# Patient Record
Sex: MALE | Race: WHITE | NOT HISPANIC OR LATINO | Employment: OTHER | URBAN - METROPOLITAN AREA
[De-identification: names, ages, dates, MRNs, and addresses within clinical notes are randomized per-mention and may not be internally consistent; named-entity substitution may affect disease eponyms.]

---

## 2019-07-02 NOTE — PROGRESS NOTES
Subjective:     Encounter Date:07/10/2019    Patient ID: Lucio Castañeda is a 78 y.o.  white male from [place], [place], retired .    REFERRING INTERNIST: Rocky Morel MD  INTERVENTIONALIST: Leo Ramirez MD    Chief Complaint:   Chief Complaint   Patient presents with   • Coronary Artery Disease     Consult     Problem List:  1. Ischemic heart disease:  a. STEMI with emergent left heart catheterization, 4/18/2015: Angioplasty and stenting of the RCA at the proximal, mid, and distal sites, extraction thrombectomy of the posterior lateral branch of the RCA, intracoronary administration of NTG, integrelin, and tPA   b. Echocardiogram, April 2015: Inferobasal hypokinesis with preserved LVEF, no pericardial effusion, the mitral valvular apparatus remains intact  c. Residual class I symptoms, July 2019, with acceptable EKG  2. Hypertension  3. Hyperlipidemia; on statin therapy  4. GERD  5. Former tobacco abuse; smoked 0.25 packs/day x15 years, quit in 2004  6. Diverticulosis  7. Degenerative disc disease  8. History of noninvasive papillary urothelial bladder carcinoma  9. Surgical history:  a. TURBT  b. Batista's procedure with subsequent colostomy takedown  c. Colon surgery for perf  d. Appendectomy  e. Right nasal basal cell resection  f. [facility]    No Known Allergies      Current Outpatient Medications:   •  ANORO ELLIPTA 62.5-25 MCG/INH aerosol powder  inhaler, Inhale 1 puff Daily., Disp: , Rfl: 3  •  carvedilol (COREG) 12.5 MG tablet, Take 12.5 mg by mouth 2 (Two) Times a Day., Disp: , Rfl: 12  •  losartan-hydrochlorothiazide (HYZAAR) 100-25 MG per tablet, Take 1 tablet by mouth Daily., Disp: , Rfl: 3  •  omeprazole (priLOSEC) 40 MG capsule, Daily., Disp: , Rfl:   •  simvastatin (ZOCOR) 40 MG tablet, Daily., Disp: , Rfl:   · ASA 81 mg daily    History of Present Illness  This is a 78-year-old white male who presents to establish cardiology care because he saw his physician who  "recommended that he follow up with a cardiologist since he had a STEMI in 2015 and has had no further cardiac work-up since that time.  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  He occasionally has shortness of breath that he attributes to his COPD.  He remotely smoked 0.25 packs/day x15 years but quit 25 years ago.  He denies any CVAs, TIAs, seizures, DVTs, PEs, orthopnea, PND, rheumatic fever, arrhythmias, valvular dysfunction, or dysfunction, or claudication.  When he had his STEMI in , he felt that he had the \"worst heartburn of my life.\"  He has not had any of these sensations since then.  He has no family history of early CAD.  The patient states that he rarely goes to the doctor.  Patient otherwise denies chest pain, shortness of breath, PND, edema, palpitations, syncope or presyncope at this time.    Cardiovascular Disease Risk Factors  hyptertension, hyperlipidemia, increased age, male gender    Social History     Socioeconomic History   • Marital status:      Spouse name: Not on file   • Number of children: 3   • Years of education: Not on file   • Highest education level: Not on file   Tobacco Use   • Smoking status: Former Smoker     Packs/day: 1.00     Types: Cigarettes     Last attempt to quit: 7/10/2004     Years since quitting: 15.0   • Smokeless tobacco: Former User   Substance and Sexual Activity   • Alcohol use: No     Frequency: Never   • Drug use: No   • Sexual activity: Defer       Family History   Problem Relation Age of Onset   • Hypertension Mother    • Cancer Father    · Mother: Hypertension  · Father:  at 52 from melanoma  · 2 brothers: Healthy  · 3 daughters: Healthy    Review of Systems   Constitution: Positive for malaise/fatigue.   HENT: Positive for hearing loss.    Eyes: Negative.    Cardiovascular: Negative for chest pain, claudication, " "dyspnea on exertion, irregular heartbeat, leg swelling, near-syncope, orthopnea, palpitations, paroxysmal nocturnal dyspnea and syncope.   Respiratory: Positive for shortness of breath, sputum production and wheezing. Negative for sleep disturbances due to breathing and snoring.    Endocrine: Negative.    Hematologic/Lymphatic: Negative.    Skin: Negative.    Musculoskeletal: Negative.    Gastrointestinal: Positive for heartburn.   Genitourinary:        Difficulty urinating   Neurological: Negative for excessive daytime sleepiness, focal weakness and seizures.   Psychiatric/Behavioral: Negative.    Allergic/Immunologic: Negative.       Obtained and negative except as outlined in problem list and HPI.      ECG 12 Lead  Date/Time: 7/10/2019 12:09 PM  Performed by: Manny Mays MD  Authorized by: Manny Mays MD   Rhythm comments: Sinus bradycardia, otherwise normal ECG, 51 bpm,  ms,  ms,  ms, no significant changes from last ECG                 Objective:       Vitals:    07/10/19 1028 07/10/19 1029 07/10/19 1030 07/10/19 1031   BP: 163/100 169/88 (!) 170/112 (!) 183/94   BP Location: Right arm Right arm Left arm Left arm   Patient Position: Sitting Standing Sitting Standing   Pulse: 52 (!) 49 64 51   Weight: 67.1 kg (148 lb)      Height: 175.3 cm (69\")      Recheck blood pressure left arm sitting was 150/80  Body mass index is 21.86 kg/m².     Physical Exam   Constitutional: He is oriented to person, place, and time. He appears well-developed and well-nourished.   HENT:   Mouth/Throat: Uvula is midline, oropharynx is clear and moist and mucous membranes are normal. He does not have dentures. No oral lesions. Normal dentition. No dental abscesses, uvula swelling, lacerations or dental caries.   Eyes:   Fundoscopic exam:       The right eye shows AV nicking. The right eye shows no exudate and no hemorrhage.        The left eye shows AV nicking. The left eye shows no exudate and no " hemorrhage.   Neck: No JVD present. Carotid bruit is not present. No thyromegaly present.   Cardiovascular: Regular rhythm, S1 normal and S2 normal. Exam reveals distant heart sounds. Exam reveals no gallop, no S3 and no friction rub.   No murmur heard.  Pulses:       Carotid pulses are 1+ on the right side, and 1+ on the left side.       Radial pulses are 1+ on the right side, and 1+ on the left side.        Femoral pulses are 1+ on the right side, and 1+ on the left side.       Popliteal pulses are 1+ on the right side, and 1+ on the left side.        Dorsalis pedis pulses are 1+ on the right side, and 1+ on the left side.        Posterior tibial pulses are 1+ on the right side, and 1+ on the left side.   Pulmonary/Chest: Effort normal. He has decreased breath sounds. He has no wheezes. He has no rhonchi. He has no rales.   Abdominal: Soft. He exhibits no mass. There is no hepatosplenomegaly. There is no tenderness. There is no guarding.   Bowel sounds audible x4   Musculoskeletal: Normal range of motion. He exhibits no edema.   Lymphadenopathy:     He has no cervical adenopathy.   Neurological: He is alert and oriented to person, place, and time.   Skin: Skin is warm, dry and intact. No rash noted.   Vitals reviewed.      Lab Review:   7/23/2018:  · Vitamin D - 36.3  · TSH - 1.63  · Vitamin B12 - 557, folate - 11.1  · Lipid panel: Cholesterol 218, triglycerides 79, HDL 48,   · CBC: WBC 7.4, RBC 5.09, hemoglobin 15.6, hematocrit 43, MCV 85, MCH 30.6, MCHC 36.3, RDW 13.5, platelets 232  · CMP: Glucose 90, BUN 26, creatinine 1.57, GFR 42, sodium 133, potassium 4.4, chloride 96, carbon dioxide 23, calcium 9.8, protein 7.5, albumin 4.8, globulin 2.7, bilirubin 0.4, alkaline phosphatase 46, AST 16, ALT 14    · ECG 6/29/2015: Sinus bradycardia, 52 bpm,  ms,  ms,  ms        Assessment:   Patient with ischemic heart disease with STEMI and stent placement x3 to the RCA in 2015 with no further  cardiac testing.  He remotely had uncontrolled hyperlipidemia with no new FLP to review.  His hypertension is uncontrolled, so we will initiate amlodipine 5 mg daily.  We will have the patient come in for an IV Lexiscan Cardiolite stress test.     Diagnosis Plan   1. IHD (ischemic heart disease)  Stress Test With Myocardial Perfusion - One Day   2. Essential hypertension   Uncontrolled, add amlodipine 5 mg daily   3. Hyperlipidemia LDL goal <70  Lipid Panel   4. Dyspnea, unspecified type  Stress Test With Myocardial Perfusion - One Day          Plan:       1. Patient to continue current medications and close follow up with the above providers other than to initiate amlodipine 5 mg daily.  2. Tentative cardiology follow up in 6 weeks or patient may return sooner PRN.  3. FLP  4. IV Lexiscan Cardiolite stress test ordered  5. 1 800 card provided    Scribed for Manny Mays MD by Jazzy Gurrola, JUAN DANIEL. 7/10/2019  12:15 PM    I, Manny Mays MD, [facility], personally performed the services described in this documentation as scribed by the above named individual in my presence, and it is both accurate and complete. At 1:30 PM on 07/10/2019

## 2019-07-10 ENCOUNTER — CONSULT (OUTPATIENT)
Dept: CARDIOLOGY | Facility: CLINIC | Age: 79
End: 2019-07-10

## 2019-07-10 VITALS
SYSTOLIC BLOOD PRESSURE: 183 MMHG | DIASTOLIC BLOOD PRESSURE: 94 MMHG | HEIGHT: 69 IN | BODY MASS INDEX: 21.92 KG/M2 | WEIGHT: 148 LBS | HEART RATE: 51 BPM

## 2019-07-10 DIAGNOSIS — E78.5 HYPERLIPIDEMIA LDL GOAL <70: ICD-10-CM

## 2019-07-10 DIAGNOSIS — I25.9 IHD (ISCHEMIC HEART DISEASE): Primary | ICD-10-CM

## 2019-07-10 DIAGNOSIS — R06.00 DYSPNEA, UNSPECIFIED TYPE: ICD-10-CM

## 2019-07-10 DIAGNOSIS — I10 ESSENTIAL HYPERTENSION: ICD-10-CM

## 2019-07-10 PROBLEM — K57.50 DIVERTICUL DISEASE SMALL AND LARGE INTESTINE, NO PERFORATI OR ABSCESS: Status: ACTIVE | Noted: 2019-07-10

## 2019-07-10 PROBLEM — K21.9 GASTROESOPHAGEAL REFLUX DISEASE WITHOUT ESOPHAGITIS: Status: ACTIVE | Noted: 2019-07-10

## 2019-07-10 PROCEDURE — 93000 ELECTROCARDIOGRAM COMPLETE: CPT | Performed by: INTERNAL MEDICINE

## 2019-07-10 PROCEDURE — 99204 OFFICE O/P NEW MOD 45 MIN: CPT | Performed by: INTERNAL MEDICINE

## 2019-07-10 RX ORDER — SIMVASTATIN 40 MG
TABLET ORAL DAILY
COMMUNITY
Start: 2019-04-23

## 2019-07-10 RX ORDER — LOSARTAN POTASSIUM AND HYDROCHLOROTHIAZIDE 25; 100 MG/1; MG/1
1 TABLET ORAL DAILY
Refills: 3 | COMMUNITY
Start: 2019-06-11 | End: 2019-10-24

## 2019-07-10 RX ORDER — AMLODIPINE BESYLATE 5 MG/1
5 TABLET ORAL DAILY
Qty: 90 TABLET | Refills: 3 | Status: SHIPPED | OUTPATIENT
Start: 2019-07-10 | End: 2019-10-24

## 2019-07-10 RX ORDER — OMEPRAZOLE 40 MG/1
CAPSULE, DELAYED RELEASE ORAL DAILY
COMMUNITY
Start: 2019-04-23 | End: 2022-12-02

## 2019-07-10 RX ORDER — CARVEDILOL 12.5 MG/1
12.5 TABLET ORAL 2 TIMES DAILY
Refills: 12 | COMMUNITY
Start: 2019-06-11

## 2019-07-19 ENCOUNTER — HOSPITAL ENCOUNTER (OUTPATIENT)
Dept: CARDIOLOGY | Facility: HOSPITAL | Age: 79
Discharge: HOME | End: 2019-07-19

## 2019-07-19 VITALS — BODY MASS INDEX: 21.92 KG/M2 | WEIGHT: 148 LBS | HEIGHT: 69 IN

## 2019-07-19 DIAGNOSIS — R06.00 DYSPNEA, UNSPECIFIED TYPE: ICD-10-CM

## 2019-07-19 DIAGNOSIS — I25.9 IHD (ISCHEMIC HEART DISEASE): ICD-10-CM

## 2019-07-19 PROCEDURE — 0 TECHNETIUM SESTAMIBI: Performed by: INTERNAL MEDICINE

## 2019-07-19 PROCEDURE — 93018 CV STRESS TEST I&R ONLY: CPT | Performed by: INTERNAL MEDICINE

## 2019-07-19 PROCEDURE — 93017 CV STRESS TEST TRACING ONLY: CPT

## 2019-07-19 PROCEDURE — 25010000002 REGADENOSON 0.4 MG/5ML SOLUTION: Performed by: INTERNAL MEDICINE

## 2019-07-19 PROCEDURE — 78452 HT MUSCLE IMAGE SPECT MULT: CPT | Performed by: INTERNAL MEDICINE

## 2019-07-19 PROCEDURE — A9500 TC99M SESTAMIBI: HCPCS | Performed by: INTERNAL MEDICINE

## 2019-07-19 PROCEDURE — 78452 HT MUSCLE IMAGE SPECT MULT: CPT

## 2019-07-19 RX ADMIN — REGADENOSON 0.4 MG: 0.08 INJECTION, SOLUTION INTRAVENOUS at 10:57

## 2019-07-19 RX ADMIN — TECHNETIUM TC 99M SESTAMIBI 1 DOSE: 1 INJECTION INTRAVENOUS at 10:54

## 2019-07-19 RX ADMIN — TECHNETIUM TC 99M SESTAMIBI 1 DOSE: 1 INJECTION INTRAVENOUS at 08:45

## 2019-07-22 LAB
BH CV NUCLEAR PRIOR STUDY: 3
BH CV STRESS BP STAGE 1: NORMAL
BH CV STRESS BP STAGE 2: NORMAL
BH CV STRESS COMMENTS STAGE 1: NORMAL
BH CV STRESS COMMENTS STAGE 2: NORMAL
BH CV STRESS DOSE REGADENOSON STAGE 1: 0.4
BH CV STRESS DURATION MIN STAGE 1: 4
BH CV STRESS DURATION MIN STAGE 2: 2
BH CV STRESS DURATION SEC STAGE 1: 0
BH CV STRESS DURATION SEC STAGE 2: 0
BH CV STRESS HR STAGE 1: 63
BH CV STRESS HR STAGE 2: 62
BH CV STRESS PROTOCOL 1: NORMAL
BH CV STRESS RECOVERY BP: NORMAL MMHG
BH CV STRESS RECOVERY HR: 61 BPM
BH CV STRESS STAGE 1: 1
BH CV STRESS STAGE 2: 2
LV EF NUC BP: 43 %
MAXIMAL PREDICTED HEART RATE: 143 BPM
PERCENT MAX PREDICTED HR: 54.55 %
STRESS BASELINE BP: NORMAL MMHG
STRESS BASELINE HR: 53 BPM
STRESS PERCENT HR: 64 %
STRESS POST PEAK BP: NORMAL MMHG
STRESS POST PEAK HR: 78 BPM
STRESS TARGET HR: 122 BPM

## 2019-10-22 NOTE — PROGRESS NOTES
"    Subjective:     Encounter Date:10/24/2019 - [place] Office    Patient ID: Lucio Castañeda (goes by Pito) is a 78 y.o.  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     REFERRING INTERNIST: Rocky Morel MD  INTERVENTIONALIST: Leo Ramirez MD    Chief Complaint:   Chief Complaint   Patient presents with   • IHD     Problem List:  1. Ischemic heart disease:  a. STEMI with emergent left heart catheterization, 4/18/2015: Angioplasty and stenting of the RCA at the proximal, mid, and distal sites, extraction thrombectomy of the posterior lateral branch of the RCA, intracoronary administration of NTG, integrelin, and tPA   b. Echocardiogram, April 2015: Inferobasal hypokinesis with preserved LVEF, no pericardial effusion, the mitral valvular apparatus remains intact  c. Residual class I symptoms, July 2019, with acceptable EKG and abnormal acceptable IV Lexiscan quantitative SPECT gated Cardiolite pharmacologic stress study without anginal type chest discomfort or further ischemic electrocardiographic changes in the setting of inferoposterior and apical \"scar\" with mild reduction in calculated global left ventricular ejection fraction (LVEF 0.43); current study suggests significant 1-2 vessel obstructive coronary artery disease with continued efforts at risk modification and medical therapy felt warranted, July 2019  2. Hypertension  3. Hyperlipidemia; on statin therapy  4. GERD  5. Former tobacco abuse; smoked 0.25 packs/day x15 years, quit in 2004  6. Diverticulosis  7. Degenerative disc disease  8. History of noninvasive papillary urothelial bladder carcinoma  9. Surgical history:  a. TURBT  b. Batista's procedure with subsequent colostomy takedown  c. Colon surgery for perf  d. Appendectomy  e. Right nasal basal cell resection  f. C     No Known Allergies    Current Outpatient Medications:   •  ANORO ELLIPTA 62.5-25 MCG/INH aerosol powder  inhaler, Inhale 1 puff Daily., " "Disp: , Rfl: 3  •  aspirin 81 MG tablet, Take 81 mg by mouth Daily., Disp: , Rfl:   •  carvedilol (COREG) 12.5 MG tablet, Take 12.5 mg by mouth 2 (Two) Times a Day., Disp: , Rfl: 12  •  losartan (COZAAR) 50 MG tablet, Take 100 mg by mouth Daily., Disp: , Rfl:   •  omeprazole (priLOSEC) 40 MG capsule, Daily., Disp: , Rfl:   •  simvastatin (ZOCOR) 40 MG tablet, Daily., Disp: , Rfl:     History of Present Illness: Patient returns for scheduled followup after a 3-1/2 month hiatus; he was a consult in our [place] office on 07/10/2019.  He was to have an FLP drawn, but this test has not been done.  He says \"I'm doing my best for an old stephen.\"  He says his mother is 95 years old and \"goes every day.\"  His losartan-HCTZ was changed to losartan because it caused his blood pressure to drop.  He also stopped taking amlodipine because it also caused hypotension, and he says \"it made my skin tingle in the sun.\"  He says this has resolved since he stopped taking the amlodipine.  He says his blood pressure typically runs 127-140/70 when he checks it at home.  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  He has not had a flu shot yet because his physician's office does not have any; they recommended that he go to the drugstore, and that is what he plans to do. Patient otherwise denies chest pain, shortness of breath, PND, edema, palpitations, syncope or presyncope at this time.        Review of Systems   Respiratory: Positive for shortness of breath.    Musculoskeletal: Positive for myalgias.      Obtained and negative except as outlined in problem list and HPI.    Procedures       Objective:       Vitals:    10/24/19 1135 10/24/19 1139 10/24/19 1152   BP: 144/70 150/78 144/86   BP Location: Left arm Left arm Right arm   Patient Position: Sitting Standing Sitting   Pulse: 58     SpO2: 98%     Weight: 67.6 kg (149 lb)     Height: 175.3 cm (69\")   "     Body mass index is 22 kg/m².   Last weight:  148 lbs.    Physical Exam   Constitutional: He is oriented to person, place, and time. He appears well-developed and well-nourished.   Neck: No JVD present. Carotid bruit is not present. No thyromegaly present.   Cardiovascular: Regular rhythm, S1 normal and S2 normal. Exam reveals no gallop, no S3 and no friction rub.   Murmur heard.   Medium-pitched harsh early systolic murmur is present with a grade of 1/6 at the upper right sternal border.  Pulses:       Carotid pulses are 1+ on the right side, and 1+ on the left side.       Radial pulses are 1+ on the right side, and 1+ on the left side.        Femoral pulses are 1+ on the right side, and 1+ on the left side.       Popliteal pulses are 1+ on the right side, and 1+ on the left side.        Dorsalis pedis pulses are 1+ on the right side, and 1+ on the left side.        Posterior tibial pulses are 1+ on the right side, and 1+ on the left side.   Pulmonary/Chest: Effort normal. He has decreased breath sounds. He has no wheezes. He has no rhonchi. He has no rales.   Abdominal: Soft. He exhibits no mass. There is no hepatosplenomegaly. There is no tenderness. There is no guarding.   Bowel sounds audible x4   Musculoskeletal: Normal range of motion. He exhibits no edema.   Lymphadenopathy:     He has no cervical adenopathy.   Neurological: He is alert and oriented to person, place, and time.   Skin: Skin is warm, dry and intact. No rash noted.   Vitals reviewed.        Lab Review:   Regadenoson stress test with myocardial perfusion SPECT, 07/19/2019:  · There is no prior study available for comparison; patient imaged with right arm at side due to right shoulder pain.  · Left ventricular ejection fraction is mildly reduced (calculated EF = 43%).  · A pharmacological stress test was performed using IV regadenoson.  · The patient reported no symptoms during the stress test.  · There was no ST segment deviation noted during  "stress.  · Arrhythmias during stress: PVC x 1.  · Diaphragmatic attenuation artifact is present.  · Myocardial perfusion imaging indicates a medium-sized infarct located in the inferior wall and apex with no significant ischemia noted (quantitative assessment demonstrates 19% inferoposterior and apical \"scar\" at rest and 8% post-stress without associated ischemia).  · Impressions are consistent with an intermediate risk study.  · Findings consistent with an abnormal acceptable pharmacologic ECG stress test.  · Abnormal acceptable IV Lexiscan quantitative SPECT gated Cardiolite pharmacologic stress study without anginal type chest discomfort or further ischemic electrocardiographic changes in the setting of inferoposterior and apical \"scar\" with mild reduction in calculated global left ventricular ejection fraction (LVEF 0.43); current study suggests significant 1-2 vessel obstructive coronary artery disease with continued efforts at risk modification and medical therapy felt warranted.  · Submitted for interpretation 22 July 2019.          Assessment:       Overall continued acceptable course with no new interim cardiopulmonary complaints with good functional status. We will defer additional diagnostic or therapeutic intervention from a cardiac perspective at this time.       Diagnosis Plan   1. Essential hypertension  Suboptimal control; continue to monitor blood pressure and follow closely with Dr. Morel   2. Dyslipidemia  No data to review; continue simvastatin   3. IHD (ischemic heart disease)  No recurrent angina pectoris or CHF on current activity schedule; continue current treatment; would defer LHC at this time            Plan:         1. Patient to continue current medications and close follow up with the above providers.  2. Influenza immunization is strongly recommended.  3. Tentative cardiology follow up in May 2020, or patient may return sooner PRN.    Transcribed by Taryn Rico for Dr." Manny Mays at 9:22 AM on 10/24/2019    I, Manny Mays MD, FACC, personally performed the services described in this documentation as scribed by the above named individual in my presence, and it is both accurate and complete. At 11:47 AM on 10/24/2019

## 2019-10-24 ENCOUNTER — OFFICE VISIT (OUTPATIENT)
Dept: CARDIOLOGY | Facility: CLINIC | Age: 79
End: 2019-10-24

## 2019-10-24 VITALS
WEIGHT: 149 LBS | HEART RATE: 58 BPM | DIASTOLIC BLOOD PRESSURE: 86 MMHG | BODY MASS INDEX: 22.07 KG/M2 | OXYGEN SATURATION: 98 % | HEIGHT: 69 IN | SYSTOLIC BLOOD PRESSURE: 144 MMHG

## 2019-10-24 DIAGNOSIS — I25.9 IHD (ISCHEMIC HEART DISEASE): ICD-10-CM

## 2019-10-24 DIAGNOSIS — E78.5 DYSLIPIDEMIA: ICD-10-CM

## 2019-10-24 DIAGNOSIS — I10 ESSENTIAL HYPERTENSION: Primary | ICD-10-CM

## 2019-10-24 PROCEDURE — 99214 OFFICE O/P EST MOD 30 MIN: CPT | Performed by: INTERNAL MEDICINE

## 2019-10-24 RX ORDER — LOSARTAN POTASSIUM 50 MG/1
100 TABLET ORAL DAILY
COMMUNITY
End: 2020-12-15 | Stop reason: HOSPADM

## 2020-03-12 ENCOUNTER — TELEPHONE (OUTPATIENT)
Dept: CARDIOLOGY | Facility: CLINIC | Age: 80
End: 2020-03-12

## 2020-03-12 NOTE — TELEPHONE ENCOUNTER
Tell him if he has had no additional cardiac complaints, he can schedule his surgery with low-moderate cardiovascular risk.  We thank Dr. Patel for allowing us to review notes from him.    Thanks

## 2020-03-12 NOTE — TELEPHONE ENCOUNTER
Pt is planning on having a right reverse total shoulder with Dr. Garth Patel at [facility]. Surgery is not yet scheduled.     Pt's LOV 10/24/19      What is patient's cardiac risk?    Please advise.

## 2020-11-30 ENCOUNTER — OFFICE VISIT (OUTPATIENT)
Dept: NEUROSURGERY | Facility: CLINIC | Age: 80
End: 2020-11-30

## 2020-11-30 VITALS
BODY MASS INDEX: 21.77 KG/M2 | HEIGHT: 69 IN | TEMPERATURE: 98 F | DIASTOLIC BLOOD PRESSURE: 64 MMHG | WEIGHT: 147 LBS | SYSTOLIC BLOOD PRESSURE: 126 MMHG

## 2020-11-30 DIAGNOSIS — G45.3 AMAUROSIS FUGAX OF LEFT EYE: ICD-10-CM

## 2020-11-30 DIAGNOSIS — I65.22 CAROTID STENOSIS, SYMPTOMATIC W/O INFARCT, LEFT: Primary | ICD-10-CM

## 2020-11-30 PROCEDURE — 99204 OFFICE O/P NEW MOD 45 MIN: CPT | Performed by: RADIOLOGY

## 2020-11-30 NOTE — PROGRESS NOTES
"NAME: FORTUNATO ALLISON   DOS: 2020  : 1940  PCP: Rocky Morel MD    Chief Complaint:    Chief Complaint   Patient presents with   • Carotid Artery Disease     Left amaurosis fugax       History of Present Illness:  79 y.o. male who experienced a 10-15-second episode of left-sided amaurosis fugax in mid 2020.  He denies any additional or recurrent stroke or TIA-like symptoms.  He had a carotid duplex which suggests a \"high-grade\" left carotid stenosis.  He has been started on a Plavix antiplatelet regimen.  He is a non-smoker.  I am seeing him in consultation regarding his symptomatic left carotid stenosis.    Past Medical History:  Past Medical History:   Diagnosis Date   • Acid reflux    • Heart failure (CMS/HCC)    • Hyperlipidemia    • Hypertension    • IHD (ischemic heart disease)        Past Surgical History:  Past Surgical History:   Procedure Laterality Date   • BLADDER TUMOR EXCISION     • COLON SURGERY         Review of Systems:        Review of Systems   Neurological: Positive for dizziness.   All other systems reviewed and are negative.       Medications    Current Outpatient Medications:   •  ANORO ELLIPTA 62.5-25 MCG/INH aerosol powder  inhaler, Inhale 1 puff Daily., Disp: , Rfl: 3  •  aspirin 81 MG tablet, Take 81 mg by mouth Daily., Disp: , Rfl:   •  carvedilol (COREG) 12.5 MG tablet, Take 12.5 mg by mouth 2 (Two) Times a Day., Disp: , Rfl: 12  •  losartan (COZAAR) 50 MG tablet, Take 100 mg by mouth Daily., Disp: , Rfl:   •  omeprazole (priLOSEC) 40 MG capsule, Daily., Disp: , Rfl:   •  simvastatin (ZOCOR) 40 MG tablet, Daily., Disp: , Rfl:     Allergies:  No Known Allergies    Social Hx:  Social History     Tobacco Use   • Smoking status: Former Smoker     Packs/day: 1.00     Types: Cigarettes     Quit date: 7/10/2004     Years since quittin.4   • Smokeless tobacco: Former User   Substance Use Topics   • Alcohol use: No     Frequency: Never   • Drug use: No " "      Family Hx:  Family History   Problem Relation Age of Onset   • Hypertension Mother    • Cancer Father        Review of Imaging:  Carotid duplex dated 11/17/2020 from the [facility] in [place], [place] was reviewed along with its corresponding radiologic report.  There are abnormally increased velocities within the left carotid vasculature concerning for a high-grade (greater than 80%) stenosis.  Peak velocities within the left carotid vasculature are 356/105 cm/s, with an ICA/CCA ratio of 5.9.  The right carotid vasculature is relatively \"void\" of emo dynamically significant disease, with peak velocities of 80/23 cm/s, and a ratio of 1.3.    Physical Examination:  Vitals:    11/30/20 1257   BP: 126/64   Temp: 98 °F (36.7 °C)        General Appearance:   Well developed, well nourished, well groomed, alert, and cooperative.  Cardiovascular: Regular rate and rhythm. No carotid bruits      Neurological examination:  Neurologic Exam     Mental Status   Oriented to person, place, and time.   Speech: speech is normal   Level of consciousness: alert    Cranial Nerves   Cranial nerves II through XII intact.     Motor Exam     Strength   Strength 5/5 throughout.     Sensory Exam   Light touch normal.     Gait, Coordination, and Reflexes     Gait  Gait: normal      Diagnoses/Plan:    Mr. Castañeda is a 79 y.o. male who experienced a 10-15-second episode of left-sided amaurosis fugax in mid November 2020.  He has had carotid duplex suggesting a \"high-grade\" left carotid stenosis as the culprit lesion.  He has been started on a Plavix antiplatelet regimen, and has experienced no recurrent stroke or TIA-like symptoms since.  I discussed various treatment options with Mr. Castañeda, to include CEA versus angioplasty/stent placement.  I instructed him that we will need to have him follow-up in the next week or so with a CT angiogram of the neck, to evaluate the morphology/location of his stenosis, and triage " care (again CEA versus stent).  He is agreeable to this, and we will follow up with him in 1 week's time with CT angiogram of the neck, deferring any treatment pending results of that study.  During the interim, he will add low-dose aspirin (start dual antiplatelet therapy), and to contact our office in/or call 911 if he experiences any recurrent stroke or TIA-like symptoms during the interim.

## 2020-12-07 ENCOUNTER — PREP FOR SURGERY (OUTPATIENT)
Dept: OTHER | Facility: HOSPITAL | Age: 80
End: 2020-12-07

## 2020-12-07 ENCOUNTER — HOSPITAL ENCOUNTER (OUTPATIENT)
Dept: CT IMAGING | Facility: HOSPITAL | Age: 80
Discharge: HOME | End: 2020-12-07
Admitting: RADIOLOGY

## 2020-12-07 ENCOUNTER — OFFICE VISIT (OUTPATIENT)
Dept: NEUROSURGERY | Facility: CLINIC | Age: 80
End: 2020-12-07

## 2020-12-07 VITALS
WEIGHT: 147 LBS | SYSTOLIC BLOOD PRESSURE: 140 MMHG | HEIGHT: 69 IN | BODY MASS INDEX: 21.77 KG/M2 | TEMPERATURE: 97.8 F | DIASTOLIC BLOOD PRESSURE: 78 MMHG

## 2020-12-07 DIAGNOSIS — I65.22 CAROTID STENOSIS, SYMPTOMATIC W/O INFARCT, LEFT: ICD-10-CM

## 2020-12-07 DIAGNOSIS — G45.3 AMAUROSIS FUGAX OF LEFT EYE: ICD-10-CM

## 2020-12-07 DIAGNOSIS — I65.22 CAROTID STENOSIS, SYMPTOMATIC W/O INFARCT, LEFT: Primary | ICD-10-CM

## 2020-12-07 LAB — CREAT BLDA-MCNC: 1.7 MG/DL (ref 0.6–1.3)

## 2020-12-07 PROCEDURE — 0 IOPAMIDOL PER 1 ML: Performed by: RADIOLOGY

## 2020-12-07 PROCEDURE — 70498 CT ANGIOGRAPHY NECK: CPT

## 2020-12-07 PROCEDURE — 99213 OFFICE O/P EST LOW 20 MIN: CPT | Performed by: RADIOLOGY

## 2020-12-07 PROCEDURE — 82565 ASSAY OF CREATININE: CPT

## 2020-12-07 RX ORDER — SODIUM CHLORIDE 0.9 % (FLUSH) 0.9 %
1-10 SYRINGE (ML) INJECTION AS NEEDED
Status: CANCELLED | OUTPATIENT
Start: 2020-12-07

## 2020-12-07 RX ORDER — SODIUM CHLORIDE 0.9 % (FLUSH) 0.9 %
3 SYRINGE (ML) INJECTION EVERY 12 HOURS SCHEDULED
Status: CANCELLED | OUTPATIENT
Start: 2020-12-07

## 2020-12-07 RX ORDER — SODIUM CHLORIDE 9 MG/ML
50 INJECTION, SOLUTION INTRAVENOUS CONTINUOUS
Status: CANCELLED | OUTPATIENT
Start: 2020-12-07

## 2020-12-07 RX ADMIN — IOPAMIDOL 70 ML: 755 INJECTION, SOLUTION INTRAVENOUS at 14:03

## 2020-12-07 NOTE — PROGRESS NOTES
"NAME: FORTUNATO ALLISON   DOS: 2020  : 1940  PCP: Rocky Morel MD    Chief Complaint:    Chief Complaint   Patient presents with   • Carotid Artery Disease     Left sided amaurosis fugax       History of Present Illness:  80 y.o. male known to the neuro interventional service, having been recently seen in the neuro interventional clinic for evaluation of symptomatic, high-grade left carotid stenosis.  Mr. Allison had experienced a 10-15-second episode of left-sided amaurosis fugax in mid 2020.  He denies any additional or recurrent stroke or TIA-like symptoms, but does complain of some \"dizziness\"..  He had a carotid duplex which suggests a \"high-grade\" left carotid stenosis, and was started a Plavix antiplatelet regimen.  He is a non-smoker.      I had recommended obtaining a CT angiogram of the neck, to triage care for his symptomatic left carotid stenosis (CEA versus carotid stenting).  He presents today for follow-up of his CT angiogram.      Past Medical History:  Past Medical History:   Diagnosis Date   • Acid reflux    • Carotid stenosis, symptomatic w/o infarct, left 2020    left sided amaurosis fugax   • Heart failure (CMS/HCC)    • Hyperlipidemia    • Hypertension    • IHD (ischemic heart disease)        Past Surgical History:  Past Surgical History:   Procedure Laterality Date   • BLADDER TUMOR EXCISION     • COLON SURGERY         Review of Systems:        Review of Systems   Neurological: Positive for dizziness.   All other systems reviewed and are negative.       Medications    Current Outpatient Medications:   •  ANORO ELLIPTA 62.5-25 MCG/INH aerosol powder  inhaler, Inhale 1 puff Daily., Disp: , Rfl: 3  •  aspirin 81 MG tablet, Take 81 mg by mouth Daily., Disp: , Rfl:   •  carvedilol (COREG) 12.5 MG tablet, Take 12.5 mg by mouth 2 (Two) Times a Day., Disp: , Rfl: 12  •  losartan (COZAAR) 50 MG tablet, Take 100 mg by mouth Daily., Disp: , Rfl:   •  omeprazole (priLOSEC) " "40 MG capsule, Daily., Disp: , Rfl:   •  simvastatin (ZOCOR) 40 MG tablet, Daily., Disp: , Rfl:   No current facility-administered medications for this visit.     Allergies:  No Known Allergies    Social Hx:  Social History     Tobacco Use   • Smoking status: Former Smoker     Packs/day: 1.00     Types: Cigarettes     Quit date: 7/10/2004     Years since quittin.4   • Smokeless tobacco: Former User   Substance Use Topics   • Alcohol use: No     Frequency: Never   • Drug use: No       Family Hx:  Family History   Problem Relation Age of Onset   • Hypertension Mother    • Cancer Father        Review of Imaging:  Carotid duplex dated 2020 from the [facility] diagnostic [place] in [place], [place] was reviewed along with its corresponding radiologic report.  There are abnormally increased velocities within the left carotid vasculature concerning for a high-grade (greater than 80%) stenosis.  Peak velocities within the left carotid vasculature are 356/105 cm/s, with an ICA/CCA ratio of 5.9.  The right carotid vasculature is relatively \"void\" of emo dynamically significant disease, with peak velocities of 80/23 cm/s, and a ratio of 1.3.    CT angiogram of the neck dated 2020 from [facility] was reviewed along with its corresponding radiologic report.  The CT angiogram demonstrates extensive lipid-laden and calcific plaque at the left common carotid bifurcation and extending into the internal carotid artery origin, producing a high-grade (greater than 80%) stenosis.  There is relatively mild calcific plaque at the right carotid bifurcation, but without hemodynamically significant disease.  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  The left posterior communicating artery similarly appears \"hypoplastic\", and I suspect that there is an \"isolated\" " "left MCA vascular territory.  Additionally, there is a probable \"high-grade\" stenosis involving the proximal left vertebral artery, and an indeterminant/possible high-grade stenosis involving the right vertebral artery origin.    Physical Examination:  Vitals:    12/07/20 1511   BP: 140/78   Temp: 97.8 °F (36.6 °C)        General Appearance:   Well developed, well nourished, well groomed, alert, and cooperative.  Cardiovascular: Regular rate and rhythm. No carotid bruits      Neurological examination:  Neurologic Exam     Mental Status   Oriented to person, place, and time.   Speech: speech is normal   Level of consciousness: alert    Cranial Nerves   Cranial nerves II through XII intact.     Motor Exam     Strength   Strength 5/5 throughout.     Sensory Exam   Light touch normal.     Gait, Coordination, and Reflexes     Gait  Gait: normal      Diagnoses/Plan:    Mr. Castañeda is a 80 y.o. male with a symptomatic, high-grade left carotid stenosis (recent amaurosis fugax).  CT angiogram today confirms a greater than 80%, symptomatic left carotid lesion, along with a probable \"isolated\" left MCA vascular territory, dependent upon the left carotid for perfusion.  I reviewed the CT angiogram with my neurosurgical colleagues, and given the lack of appreciable collateral flow, we feel that carotid angioplasty/stent placement is the optimal treatment for his symptomatic left carotid disease.  He will be maintained on his a dual antiplatelet regimen, and we will tentatively schedule him for left carotid angioplasty/stent placement in the next week or so.  Additionally, we will perform a full diagnostic angiogram, not only to evaluate his collateral flow, but into interrogate possible high-grade bilateral high-grade left vertebral disease which may be contributing to some of his dizziness symptoms.                  "

## 2020-12-07 NOTE — H&P (VIEW-ONLY)
"NAME: FORTUNATO ALLISON   DOS: 2020  : 1940  PCP: Rocky Morel MD    Chief Complaint:    Chief Complaint   Patient presents with   • Carotid Artery Disease     Left sided amaurosis fugax       History of Present Illness:  80 y.o. male known to the neuro interventional service, having been recently seen in the neuro interventional clinic for evaluation of symptomatic, high-grade left carotid stenosis.  Mr. Allison had experienced a 10-15-second episode of left-sided amaurosis fugax in mid 2020.  He denies any additional or recurrent stroke or TIA-like symptoms, but does complain of some \"dizziness\"..  He had a carotid duplex which suggests a \"high-grade\" left carotid stenosis, and was started a Plavix antiplatelet regimen.  He is a non-smoker.      I had recommended obtaining a CT angiogram of the neck, to triage care for his symptomatic left carotid stenosis (CEA versus carotid stenting).  He presents today for follow-up of his CT angiogram.      Past Medical History:  Past Medical History:   Diagnosis Date   • Acid reflux    • Carotid stenosis, symptomatic w/o infarct, left 2020    left sided amaurosis fugax   • Heart failure (CMS/HCC)    • Hyperlipidemia    • Hypertension    • IHD (ischemic heart disease)        Past Surgical History:  Past Surgical History:   Procedure Laterality Date   • BLADDER TUMOR EXCISION     • COLON SURGERY         Review of Systems:        Review of Systems   Neurological: Positive for dizziness.   All other systems reviewed and are negative.       Medications    Current Outpatient Medications:   •  ANORO ELLIPTA 62.5-25 MCG/INH aerosol powder  inhaler, Inhale 1 puff Daily., Disp: , Rfl: 3  •  aspirin 81 MG tablet, Take 81 mg by mouth Daily., Disp: , Rfl:   •  carvedilol (COREG) 12.5 MG tablet, Take 12.5 mg by mouth 2 (Two) Times a Day., Disp: , Rfl: 12  •  losartan (COZAAR) 50 MG tablet, Take 100 mg by mouth Daily., Disp: , Rfl:   •  omeprazole (priLOSEC) " "40 MG capsule, Daily., Disp: , Rfl:   •  simvastatin (ZOCOR) 40 MG tablet, Daily., Disp: , Rfl:   No current facility-administered medications for this visit.     Allergies:  No Known Allergies    Social Hx:  Social History     Tobacco Use   • Smoking status: Former Smoker     Packs/day: 1.00     Types: Cigarettes     Quit date: 7/10/2004     Years since quittin.4   • Smokeless tobacco: Former User   Substance Use Topics   • Alcohol use: No     Frequency: Never   • Drug use: No       Family Hx:  Family History   Problem Relation Age of Onset   • Hypertension Mother    • Cancer Father        Review of Imaging:  Carotid duplex dated 2020 from the [facility] diagnostic [place] in [place], [place] was reviewed along with its corresponding radiologic report.  There are abnormally increased velocities within the left carotid vasculature concerning for a high-grade (greater than 80%) stenosis.  Peak velocities within the left carotid vasculature are 356/105 cm/s, with an ICA/CCA ratio of 5.9.  The right carotid vasculature is relatively \"void\" of emo dynamically significant disease, with peak velocities of 80/23 cm/s, and a ratio of 1.3.    CT angiogram of the neck dated 2020 from [facility] was reviewed along with its corresponding radiologic report.  The CT angiogram demonstrates extensive lipid-laden and calcific plaque at the left common carotid bifurcation and extending into the internal carotid artery origin, producing a high-grade (greater than 80%) stenosis.  There is relatively mild calcific plaque at the right carotid bifurcation, but without hemodynamically significant disease.  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  The left posterior communicating artery similarly appears \"hypoplastic\", and I suspect that there is an \"isolated\" " "left MCA vascular territory.  Additionally, there is a probable \"high-grade\" stenosis involving the proximal left vertebral artery, and an indeterminant/possible high-grade stenosis involving the right vertebral artery origin.    Physical Examination:  Vitals:    12/07/20 1511   BP: 140/78   Temp: 97.8 °F (36.6 °C)        General Appearance:   Well developed, well nourished, well groomed, alert, and cooperative.  Cardiovascular: Regular rate and rhythm. No carotid bruits      Neurological examination:  Neurologic Exam     Mental Status   Oriented to person, place, and time.   Speech: speech is normal   Level of consciousness: alert    Cranial Nerves   Cranial nerves II through XII intact.     Motor Exam     Strength   Strength 5/5 throughout.     Sensory Exam   Light touch normal.     Gait, Coordination, and Reflexes     Gait  Gait: normal      Diagnoses/Plan:    Mr. Castañeda is a 80 y.o. male with a symptomatic, high-grade left carotid stenosis (recent amaurosis fugax).  CT angiogram today confirms a greater than 80%, symptomatic left carotid lesion, along with a probable \"isolated\" left MCA vascular territory, dependent upon the left carotid for perfusion.  I reviewed the CT angiogram with my neurosurgical colleagues, and given the lack of appreciable collateral flow, we feel that carotid angioplasty/stent placement is the optimal treatment for his symptomatic left carotid disease.  He will be maintained on his a dual antiplatelet regimen, and we will tentatively schedule him for left carotid angioplasty/stent placement in the next week or so.  Additionally, we will perform a full diagnostic angiogram, not only to evaluate his collateral flow, but into interrogate possible high-grade bilateral high-grade left vertebral disease which may be contributing to some of his dizziness symptoms.                  "

## 2020-12-11 ENCOUNTER — APPOINTMENT (OUTPATIENT)
Dept: PREADMISSION TESTING | Facility: HOSPITAL | Age: 80
End: 2020-12-11

## 2020-12-11 LAB — SARS-COV-2 RNA RESP QL NAA+PROBE: NOT DETECTED

## 2020-12-11 PROCEDURE — U0004 COV-19 TEST NON-CDC HGH THRU: HCPCS

## 2020-12-11 PROCEDURE — C9803 HOPD COVID-19 SPEC COLLECT: HCPCS

## 2020-12-14 ENCOUNTER — APPOINTMENT (OUTPATIENT)
Dept: CARDIOLOGY | Facility: HOSPITAL | Age: 80
End: 2020-12-14

## 2020-12-14 ENCOUNTER — HOSPITAL ENCOUNTER (INPATIENT)
Facility: HOSPITAL | Age: 80
LOS: 1 days | Discharge: HOME | End: 2020-12-15
Attending: RADIOLOGY | Admitting: RADIOLOGY

## 2020-12-14 DIAGNOSIS — G45.3 AMAUROSIS FUGAX OF LEFT EYE: ICD-10-CM

## 2020-12-14 DIAGNOSIS — I65.22 CAROTID STENOSIS, SYMPTOMATIC W/O INFARCT, LEFT: ICD-10-CM

## 2020-12-14 PROBLEM — T14.8XXA HEMATOMA: Status: ACTIVE | Noted: 2020-12-14

## 2020-12-14 LAB
ABO GROUP BLD: NORMAL
ABO GROUP BLD: NORMAL
ACT BLD: 274 SECONDS (ref 82–152)
ANION GAP SERPL CALCULATED.3IONS-SCNC: 9 MMOL/L (ref 5–15)
BH CV ECHO MEAS - BSA(HAYCOCK): 1.8 M^2
BH CV ECHO MEAS - BSA(HAYCOCK): 1.8 M^2
BH CV ECHO MEAS - BSA: 1.8 M^2
BH CV ECHO MEAS - BSA: 1.8 M^2
BH CV ECHO MEAS - BZI_BMI: 21.4 KILOGRAMS/M^2
BH CV ECHO MEAS - BZI_BMI: 21.4 KILOGRAMS/M^2
BH CV ECHO MEAS - BZI_METRIC_HEIGHT: 177.8 CM
BH CV ECHO MEAS - BZI_METRIC_HEIGHT: 177.8 CM
BH CV ECHO MEAS - BZI_METRIC_WEIGHT: 67.6 KG
BH CV ECHO MEAS - BZI_METRIC_WEIGHT: 67.6 KG
BH CV LEFT CCA HIDDEN LRR: 1 CM/S
BH CV MID LEFT ICA HIDDEN LRR: 1 CM
BH CV RIGHT GROIN PSA PROCEDURE SCRIPTING LRR: 1
BH CV VAS CAROTID LEFT DISTAL STENT EDV: 28.9 CM/S
BH CV VAS CAROTID LEFT DISTAL STENT: 109 CM/S
BH CV VAS CAROTID LEFT DISTAL TO STENT EDV: 31.4 CM/S
BH CV VAS CAROTID LEFT DISTAL TO STENT: 116 CM/S
BH CV VAS CAROTID LEFT MID STENT EDV: 42.1 CM/S
BH CV VAS CAROTID LEFT MID STENT: 131 CM/S
BH CV VAS CAROTID LEFT PROXIMAL STENT EDV: 21 CM/S
BH CV VAS CAROTID LEFT PROXIMAL STENT: 86.4 CM/S
BH CV VAS CAROTID LEFT PROXIMAL TO STENT: 62.4 CM/S
BH CV VAS CAROTID LEFT STENT NATIVE VESSEL PROXIMAL ED: 11.8 CM/S
BH CV XLRA MEAS EXT ILIAC A PSV RIGHT: 159 CM/SEC
BH CV XLRA MEAS LEFT CCA RATIO VEL: 62.4 CM/SEC
BH CV XLRA MEAS LEFT DIST CCA EDV: 12.2 CM/SEC
BH CV XLRA MEAS LEFT DIST CCA PSV: 62.9 CM/SEC
BH CV XLRA MEAS LEFT DIST ICA EDV: 26.7 CM/SEC
BH CV XLRA MEAS LEFT DIST ICA PSV: 70.1 CM/SEC
BH CV XLRA MEAS LEFT ICA RATIO VEL: 131 CM/SEC
BH CV XLRA MEAS LEFT ICA/CCA RATIO: 2.1
BH CV XLRA MEAS LEFT MID CCA EDV: 18.8 CM/SEC
BH CV XLRA MEAS LEFT MID CCA PSV: 80.3 CM/SEC
BH CV XLRA MEAS LEFT MID ICA EDV: 28.3 CM/SEC
BH CV XLRA MEAS LEFT MID ICA PSV: 103.7 CM/SEC
BH CV XLRA MEAS LEFT PROX CCA EDV: 15.7 CM/SEC
BH CV XLRA MEAS LEFT PROX CCA PSV: 76 CM/SEC
BH CV XLRA MEAS LEFT PROX ECA EDV: 18.7 CM/SEC
BH CV XLRA MEAS LEFT PROX ECA PSV: 142.4 CM/SEC
BH CV XLRA MEAS LEFT PROX ICA EDV: 42.7 CM/SEC
BH CV XLRA MEAS LEFT PROX ICA PSV: 131.4 CM/SEC
BH CV XLRA MEAS LEFT PROX SCLA PSV: 173 CM/SEC
BH CV XLRA MEAS LEFT VERTEBRAL A EDV: 31.9 CM/SEC
BH CV XLRA MEAS LEFT VERTEBRAL A PSV: 87.9 CM/SEC
BH CV XLRA MEAS RIGHT CCA RATIO VEL: 62.9 CM/SEC
BH CV XLRA MEAS RIGHT DIST CCA EDV: 19.6 CM/SEC
BH CV XLRA MEAS RIGHT DIST CCA PSV: 63.3 CM/SEC
BH CV XLRA MEAS RIGHT DIST ICA EDV: 40.3 CM/SEC
BH CV XLRA MEAS RIGHT DIST ICA PSV: 102.1 CM/SEC
BH CV XLRA MEAS RIGHT ICA RATIO VEL: 115 CM/SEC
BH CV XLRA MEAS RIGHT ICA/CCA RATIO: 1.8
BH CV XLRA MEAS RIGHT MID CCA EDV: 22.3 CM/SEC
BH CV XLRA MEAS RIGHT MID CCA PSV: 62.4 CM/SEC
BH CV XLRA MEAS RIGHT MID ICA EDV: 44.2 CM/SEC
BH CV XLRA MEAS RIGHT MID ICA PSV: 115.9 CM/SEC
BH CV XLRA MEAS RIGHT PROX CCA EDV: 17.9 CM/SEC
BH CV XLRA MEAS RIGHT PROX CCA PSV: 60.2 CM/SEC
BH CV XLRA MEAS RIGHT PROX ECA PSV: 88.6 CM/SEC
BH CV XLRA MEAS RIGHT PROX ICA EDV: 37.1 CM/SEC
BH CV XLRA MEAS RIGHT PROX ICA PSV: 95.2 CM/SEC
BH CV XLRA MEAS RIGHT PROX SCLA PSV: 96.6 CM/SEC
BH CV XLRA MEAS RIGHT VERTEBRAL A EDV: 24.8 CM/SEC
BH CV XLRA MEAS RIGHT VERTEBRAL A PSV: 74.7 CM/SEC
BH CVPROX LEFT ICA HIDDEN LRR: 1 CM
BLD GP AB SCN SERPL QL: NEGATIVE
BUN SERPL-MCNC: 20 MG/DL (ref 8–23)
BUN/CREAT SERPL: 14.4 (ref 7–25)
CALCIUM SPEC-SCNC: 10.3 MG/DL (ref 8.6–10.5)
CHLORIDE SERPL-SCNC: 103 MMOL/L (ref 98–107)
CO2 SERPL-SCNC: 28 MMOL/L (ref 22–29)
CREAT SERPL-MCNC: 1.39 MG/DL (ref 0.76–1.27)
DEPRECATED RDW RBC AUTO: 39.7 FL (ref 37–54)
ERYTHROCYTE [DISTWIDTH] IN BLOOD BY AUTOMATED COUNT: 12.4 % (ref 12.3–15.4)
GFR SERPL CREATININE-BSD FRML MDRD: 49 ML/MIN/1.73
GLUCOSE SERPL-MCNC: 124 MG/DL (ref 65–99)
HCT VFR BLD AUTO: 38.1 % (ref 37.5–51)
HCT VFR BLD AUTO: 44.9 % (ref 37.5–51)
HGB BLD-MCNC: 12.4 G/DL (ref 13–17.7)
HGB BLD-MCNC: 14.9 G/DL (ref 13–17.7)
LEFT ARM BP: NORMAL MMHG
MCH RBC QN AUTO: 28.9 PG (ref 26.6–33)
MCHC RBC AUTO-ENTMCNC: 33.2 G/DL (ref 31.5–35.7)
MCV RBC AUTO: 87.2 FL (ref 79–97)
PLATELET # BLD AUTO: 202 10*3/MM3 (ref 140–450)
PMV BLD AUTO: 9.8 FL (ref 6–12)
POTASSIUM SERPL-SCNC: 4.4 MMOL/L (ref 3.5–5.2)
PROX PFA PSV RIGHT: -209.9 CM/SEC
PROX SFA PSV RIGHT: -172.9 CM/SEC
RBC # BLD AUTO: 5.15 10*6/MM3 (ref 4.14–5.8)
RH BLD: POSITIVE
RH BLD: POSITIVE
RIGHT CFA PROX SYS PSV: 152.2 CM/SEC
RIGHT GROIN CFA SYS: 161 CM/SEC
SODIUM SERPL-SCNC: 140 MMOL/L (ref 136–145)
T&S EXPIRATION DATE: NORMAL
WBC # BLD AUTO: 9.23 10*3/MM3 (ref 3.4–10.8)

## 2020-12-14 PROCEDURE — 93880 EXTRACRANIAL BILAT STUDY: CPT

## 2020-12-14 PROCEDURE — C1884 EMBOLIZATION PROTECT SYST: HCPCS | Performed by: RADIOLOGY

## 2020-12-14 PROCEDURE — 25010000002 MIDAZOLAM PER 1 MG: Performed by: RADIOLOGY

## 2020-12-14 PROCEDURE — 93880 EXTRACRANIAL BILAT STUDY: CPT | Performed by: INTERNAL MEDICINE

## 2020-12-14 PROCEDURE — 94640 AIRWAY INHALATION TREATMENT: CPT

## 2020-12-14 PROCEDURE — C1894 INTRO/SHEATH, NON-LASER: HCPCS | Performed by: RADIOLOGY

## 2020-12-14 PROCEDURE — 93926 LOWER EXTREMITY STUDY: CPT | Performed by: INTERNAL MEDICINE

## 2020-12-14 PROCEDURE — C1769 GUIDE WIRE: HCPCS | Performed by: RADIOLOGY

## 2020-12-14 PROCEDURE — 36226 PLACE CATH VERTEBRAL ART: CPT | Performed by: RADIOLOGY

## 2020-12-14 PROCEDURE — 0 IODIXANOL PER 1 ML: Performed by: RADIOLOGY

## 2020-12-14 PROCEDURE — 94799 UNLISTED PULMONARY SVC/PX: CPT

## 2020-12-14 PROCEDURE — 86901 BLOOD TYPING SEROLOGIC RH(D): CPT | Performed by: INTERNAL MEDICINE

## 2020-12-14 PROCEDURE — 85014 HEMATOCRIT: CPT | Performed by: INTERNAL MEDICINE

## 2020-12-14 PROCEDURE — 25010000002 FENTANYL CITRATE (PF) 100 MCG/2ML SOLUTION: Performed by: RADIOLOGY

## 2020-12-14 PROCEDURE — 36225 PLACE CATH SUBCLAVIAN ART: CPT | Performed by: RADIOLOGY

## 2020-12-14 PROCEDURE — B3191ZZ FLUOROSCOPY OF RIGHT EXTERNAL CAROTID ARTERY USING LOW OSMOLAR CONTRAST: ICD-10-PCS | Performed by: RADIOLOGY

## 2020-12-14 PROCEDURE — 86900 BLOOD TYPING SEROLOGIC ABO: CPT | Performed by: INTERNAL MEDICINE

## 2020-12-14 PROCEDURE — C1760 CLOSURE DEV, VASC: HCPCS | Performed by: RADIOLOGY

## 2020-12-14 PROCEDURE — 85027 COMPLETE CBC AUTOMATED: CPT

## 2020-12-14 PROCEDURE — 86901 BLOOD TYPING SEROLOGIC RH(D): CPT

## 2020-12-14 PROCEDURE — 99232 SBSQ HOSP IP/OBS MODERATE 35: CPT | Performed by: INTERNAL MEDICINE

## 2020-12-14 PROCEDURE — G0269 OCCLUSIVE DEVICE IN VEIN ART: HCPCS | Performed by: RADIOLOGY

## 2020-12-14 PROCEDURE — 80048 BASIC METABOLIC PNL TOTAL CA: CPT

## 2020-12-14 PROCEDURE — C1725 CATH, TRANSLUMIN NON-LASER: HCPCS | Performed by: RADIOLOGY

## 2020-12-14 PROCEDURE — 25010000002 HEPARIN (PORCINE) PER 1000 UNITS: Performed by: RADIOLOGY

## 2020-12-14 PROCEDURE — C1876 STENT, NON-COA/NON-COV W/DEL: HCPCS | Performed by: RADIOLOGY

## 2020-12-14 PROCEDURE — 37215 TRANSCATH STENT CCA W/EPS: CPT | Performed by: RADIOLOGY

## 2020-12-14 PROCEDURE — 86850 RBC ANTIBODY SCREEN: CPT | Performed by: INTERNAL MEDICINE

## 2020-12-14 PROCEDURE — B3161ZZ FLUOROSCOPY OF RIGHT INTERNAL CAROTID ARTERY USING LOW OSMOLAR CONTRAST: ICD-10-PCS | Performed by: RADIOLOGY

## 2020-12-14 PROCEDURE — 037L3DZ DILATION OF LEFT INTERNAL CAROTID ARTERY WITH INTRALUMINAL DEVICE, PERCUTANEOUS APPROACH: ICD-10-PCS | Performed by: RADIOLOGY

## 2020-12-14 PROCEDURE — 25010000002 ATROPINE PER 0.01 MG: Performed by: RADIOLOGY

## 2020-12-14 PROCEDURE — 85347 COAGULATION TIME ACTIVATED: CPT

## 2020-12-14 PROCEDURE — B31G1ZZ FLUOROSCOPY OF BILATERAL VERTEBRAL ARTERIES USING LOW OSMOLAR CONTRAST: ICD-10-PCS | Performed by: RADIOLOGY

## 2020-12-14 PROCEDURE — 93926 LOWER EXTREMITY STUDY: CPT

## 2020-12-14 PROCEDURE — 86900 BLOOD TYPING SEROLOGIC ABO: CPT

## 2020-12-14 PROCEDURE — 36223 PLACE CATH CAROTID/INOM ART: CPT | Performed by: RADIOLOGY

## 2020-12-14 PROCEDURE — 85018 HEMOGLOBIN: CPT | Performed by: INTERNAL MEDICINE

## 2020-12-14 PROCEDURE — 25010000002 PHENYLEPHRINE 10 MG/ML SOLUTION: Performed by: PHYSICIAN ASSISTANT

## 2020-12-14 DEVICE — XACT CAROTID STENT SYSTEM 10.0 MM X 40 MM TAPERED
Type: IMPLANTABLE DEVICE | Status: FUNCTIONAL
Brand: XACT

## 2020-12-14 RX ORDER — ATROPINE SULFATE 1 MG/ML
INJECTION, SOLUTION INTRAMUSCULAR; INTRAVENOUS; SUBCUTANEOUS AS NEEDED
Status: DISCONTINUED | OUTPATIENT
Start: 2020-12-14 | End: 2020-12-14 | Stop reason: HOSPADM

## 2020-12-14 RX ORDER — FENTANYL CITRATE 50 UG/ML
INJECTION, SOLUTION INTRAMUSCULAR; INTRAVENOUS AS NEEDED
Status: DISCONTINUED | OUTPATIENT
Start: 2020-12-14 | End: 2020-12-14 | Stop reason: HOSPADM

## 2020-12-14 RX ORDER — PHENYLEPHRINE HCL IN 0.9% NACL 0.5 MG/5ML
.5-3 SYRINGE (ML) INTRAVENOUS
Status: DISCONTINUED | OUTPATIENT
Start: 2020-12-14 | End: 2020-12-15 | Stop reason: HOSPADM

## 2020-12-14 RX ORDER — LIDOCAINE HYDROCHLORIDE 10 MG/ML
INJECTION, SOLUTION EPIDURAL; INFILTRATION; INTRACAUDAL; PERINEURAL AS NEEDED
Status: DISCONTINUED | OUTPATIENT
Start: 2020-12-14 | End: 2020-12-14 | Stop reason: HOSPADM

## 2020-12-14 RX ORDER — SODIUM CHLORIDE 0.9 % (FLUSH) 0.9 %
10 SYRINGE (ML) INJECTION AS NEEDED
Status: DISCONTINUED | OUTPATIENT
Start: 2020-12-14 | End: 2020-12-15 | Stop reason: HOSPADM

## 2020-12-14 RX ORDER — MIDAZOLAM HYDROCHLORIDE 1 MG/ML
INJECTION INTRAMUSCULAR; INTRAVENOUS AS NEEDED
Status: DISCONTINUED | OUTPATIENT
Start: 2020-12-14 | End: 2020-12-14 | Stop reason: HOSPADM

## 2020-12-14 RX ORDER — CLOPIDOGREL BISULFATE 75 MG/1
75 TABLET ORAL DAILY
Status: DISCONTINUED | OUTPATIENT
Start: 2020-12-15 | End: 2020-12-15 | Stop reason: HOSPADM

## 2020-12-14 RX ORDER — AMLODIPINE BESYLATE 5 MG/1
5 TABLET ORAL DAILY
Status: DISCONTINUED | OUTPATIENT
Start: 2020-12-15 | End: 2020-12-15 | Stop reason: HOSPADM

## 2020-12-14 RX ORDER — SODIUM CHLORIDE 0.9 % (FLUSH) 0.9 %
3 SYRINGE (ML) INJECTION EVERY 12 HOURS SCHEDULED
Status: DISCONTINUED | OUTPATIENT
Start: 2020-12-14 | End: 2020-12-14

## 2020-12-14 RX ORDER — SODIUM CHLORIDE 0.9 % (FLUSH) 0.9 %
3 SYRINGE (ML) INJECTION EVERY 12 HOURS SCHEDULED
Status: DISCONTINUED | OUTPATIENT
Start: 2020-12-14 | End: 2020-12-15 | Stop reason: HOSPADM

## 2020-12-14 RX ORDER — CLOPIDOGREL BISULFATE 75 MG/1
75 TABLET ORAL DAILY
COMMUNITY
End: 2020-12-15 | Stop reason: SDUPTHER

## 2020-12-14 RX ORDER — ACETAMINOPHEN 325 MG/1
650 TABLET ORAL EVERY 6 HOURS PRN
Status: DISCONTINUED | OUTPATIENT
Start: 2020-12-14 | End: 2020-12-15 | Stop reason: HOSPADM

## 2020-12-14 RX ORDER — SODIUM CHLORIDE 9 MG/ML
50 INJECTION, SOLUTION INTRAVENOUS CONTINUOUS
Status: DISCONTINUED | OUTPATIENT
Start: 2020-12-14 | End: 2020-12-15 | Stop reason: HOSPADM

## 2020-12-14 RX ORDER — CARVEDILOL 12.5 MG/1
12.5 TABLET ORAL 2 TIMES DAILY WITH MEALS
Status: DISCONTINUED | OUTPATIENT
Start: 2020-12-14 | End: 2020-12-15 | Stop reason: HOSPADM

## 2020-12-14 RX ORDER — IODIXANOL 320 MG/ML
INJECTION, SOLUTION INTRAVASCULAR AS NEEDED
Status: DISCONTINUED | OUTPATIENT
Start: 2020-12-14 | End: 2020-12-14 | Stop reason: HOSPADM

## 2020-12-14 RX ORDER — ASPIRIN 81 MG/1
81 TABLET ORAL DAILY
Status: DISCONTINUED | OUTPATIENT
Start: 2020-12-14 | End: 2020-12-15 | Stop reason: HOSPADM

## 2020-12-14 RX ORDER — SODIUM CHLORIDE 0.9 % (FLUSH) 0.9 %
1-10 SYRINGE (ML) INJECTION AS NEEDED
Status: DISCONTINUED | OUTPATIENT
Start: 2020-12-14 | End: 2020-12-14

## 2020-12-14 RX ORDER — IPRATROPIUM BROMIDE AND ALBUTEROL SULFATE 2.5; .5 MG/3ML; MG/3ML
3 SOLUTION RESPIRATORY (INHALATION)
Status: DISCONTINUED | OUTPATIENT
Start: 2020-12-14 | End: 2020-12-15 | Stop reason: HOSPADM

## 2020-12-14 RX ORDER — ATORVASTATIN CALCIUM 20 MG/1
20 TABLET, FILM COATED ORAL NIGHTLY
Status: DISCONTINUED | OUTPATIENT
Start: 2020-12-14 | End: 2020-12-15 | Stop reason: HOSPADM

## 2020-12-14 RX ORDER — HEPARIN SODIUM 1000 [USP'U]/ML
INJECTION, SOLUTION INTRAVENOUS; SUBCUTANEOUS AS NEEDED
Status: DISCONTINUED | OUTPATIENT
Start: 2020-12-14 | End: 2020-12-14 | Stop reason: HOSPADM

## 2020-12-14 RX ORDER — LOSARTAN POTASSIUM 50 MG/1
100 TABLET ORAL DAILY
Status: DISCONTINUED | OUTPATIENT
Start: 2020-12-14 | End: 2020-12-15 | Stop reason: HOSPADM

## 2020-12-14 RX ORDER — PANTOPRAZOLE SODIUM 40 MG/1
40 TABLET, DELAYED RELEASE ORAL
Status: DISCONTINUED | OUTPATIENT
Start: 2020-12-15 | End: 2020-12-15 | Stop reason: HOSPADM

## 2020-12-14 RX ORDER — SODIUM CHLORIDE 9 MG/ML
75 INJECTION, SOLUTION INTRAVENOUS CONTINUOUS
Status: ACTIVE | OUTPATIENT
Start: 2020-12-14 | End: 2020-12-14

## 2020-12-14 RX ORDER — AMLODIPINE BESYLATE 5 MG/1
5 TABLET ORAL DAILY
COMMUNITY
End: 2021-01-21

## 2020-12-14 RX ADMIN — IPRATROPIUM BROMIDE AND ALBUTEROL SULFATE 3 ML: 2.5; .5 SOLUTION RESPIRATORY (INHALATION) at 15:39

## 2020-12-14 RX ADMIN — CARVEDILOL 12.5 MG: 12.5 TABLET, FILM COATED ORAL at 18:04

## 2020-12-14 RX ADMIN — PHENYLEPHRINE HYDROCHLORIDE 0.5 MCG/KG/MIN: 10 INJECTION INTRAVENOUS at 09:37

## 2020-12-14 RX ADMIN — SODIUM CHLORIDE 50 ML/HR: 9 INJECTION, SOLUTION INTRAVENOUS at 09:55

## 2020-12-14 RX ADMIN — ASPIRIN 81 MG: 81 TABLET, FILM COATED ORAL at 09:52

## 2020-12-14 RX ADMIN — SODIUM CHLORIDE, PRESERVATIVE FREE 3 ML: 5 INJECTION INTRAVENOUS at 21:10

## 2020-12-14 RX ADMIN — SODIUM CHLORIDE, PRESERVATIVE FREE 3 ML: 5 INJECTION INTRAVENOUS at 09:40

## 2020-12-14 RX ADMIN — SODIUM CHLORIDE 50 ML/HR: 9 INJECTION, SOLUTION INTRAVENOUS at 07:33

## 2020-12-14 RX ADMIN — SODIUM CHLORIDE 75 ML/HR: 9 INJECTION, SOLUTION INTRAVENOUS at 09:36

## 2020-12-14 RX ADMIN — ACETAMINOPHEN 650 MG: 325 TABLET, FILM COATED ORAL at 16:49

## 2020-12-14 RX ADMIN — IPRATROPIUM BROMIDE AND ALBUTEROL SULFATE 3 ML: 2.5; .5 SOLUTION RESPIRATORY (INHALATION) at 23:30

## 2020-12-14 RX ADMIN — ATORVASTATIN CALCIUM 20 MG: 20 TABLET, FILM COATED ORAL at 21:09

## 2020-12-14 NOTE — PROGRESS NOTES
Patient was doing well postoperatively, but called from the ICU, patient developed a right groin hematoma.  Instructed to hold pressure for 30 minutes, and obtain stat H/H.    After 30 minutes of pressure, I was able to see him in the ICU, and he has a small superficial hematoma.  The right groin is otherwise soft.  Distal pulses are present.  He is in no acute distress.  H/H demonstrates a small, expected procedural related blood loss, will recheck tomorrow in a.m.  No back pain or evidence of retroperitoneal hematoma.    We will keep on bedrest throughout the night, but if hematoma is stable and blood counts are good in the morning, I anticipate him being okay to discharge tomorrow in afternoon.

## 2020-12-14 NOTE — BRIEF OP NOTE
"CAROTID STENT  Progress Note    Lucio Castañeda  12/14/2020    Pre-op Diagnosis:   Carotid stenosis, symptomatic w/o infarct, left [I65.22]       Post-Op Diagnosis Codes:     * Carotid stenosis, symptomatic w/o infarct, left [I65.22]     * Amaurosis fugax of left eye [G45.3]    Procedure/CPT® Codes:        Procedure(s):  Left Carotid Stent    Surgeon(s):  Edu Montilla MD    Anesthesia: Sedation    Staff:   Circulator: Sara Shah RN  Scrub Person: Sangeeta Guardado  Documenter: Rosey Nolasco  Invasive Nurse: Sara Shah RN         Estimated Blood Loss: minimal    Urine Voided: * No values recorded between 12/14/2020  8:01 AM and 12/14/2020  8:53 AM *    Specimens:                None          Drains: * No LDAs found *    Findings: There is bulky calcific plaque at the left carotid bifurcation and extending into the internal carotid origin, producing a symptomatic, high-grade (greater than 90%) stenosis.  The patient essentially had a \"isolated\" left MCA vascular territory, without appreciable collateral flow, and thus the lesion was deemed a \"high surgical risk\" and was treated with angioplasty/stent placement, restoring a near normal caliber lumen and resulting in improved perfusion to the left cerebral hemisphere.    Complications: None apparent.          Edu Montilla MD     Date: 12/14/2020  Time: 09:02 EST      "

## 2020-12-14 NOTE — PROGRESS NOTES
"Critical Care Note     LOS: 0 days   Patient Care Team:  Rocky Morel MD as PCP - General (Family Medicine)  Rocky Morel MD as Referring Physician (Family Medicine)    Chief Complaint/Reason for visit:  No chief complaint on file.      Subjective     HPI:  Lucio Castañeda is a 80 y.o. male, former smoker, with past medical history significant for HTN, ischemic heart disease, heart failure, HL, and COPD.  The patient has been followed by  and neuro intervention.  Recently in mid November, he had an episode of left amaurosis fugax which lasted approximately 10-15 seconds.  He underwent carotid duplex which showed high-grade left carotid stenosis and was started on Plavix.  On 12/7/2020 patient underwent CT angio of the neck which showed severe stenosis, greater than 80%, of the left proximal ICA.  Additionally, it was noted moderate stenosis of the left distal common carotid artery, and mid left vertebral artery.  Carotid stent was recommended.    Interval History:   Patient comes to ICU following left carotid stent with neuro intervention.  He was found with \"bulky calcific plaque\" at the left carotid bifurcation for which she was treated with angioplasty and stent placement.  He denies recent history of fever or chills, cough, congestion, loss of taste or smell, chest pain or shortness of air, nausea/vomiting/constipation/diarrhea/abdominal pain, or dependent edema.  He denies use of alcohol, tobacco, or illicit drugs.  He denies any slurred speech, weakness or numbness, visual changes.      History taken from: patient    Review of Systems:    All systems were reviewed and negative except as noted in subjective.    Medical history, surgical history, social history, family history reviewed    Objective     Intake/Output:  No intake or output data in the 24 hours ending 12/14/20 1115    Nutrition:  Diet Regular; Cardiac    Infusions:  niCARdipine, 5-15 mg/hr  phenylephrine, 0.5-3 " "mcg/kg/min, Last Rate: Stopped (12/14/20 1019)  sodium chloride, 50 mL/hr, Last Rate: Stopped (12/14/20 1020)  sodium chloride, 75 mL/hr, Last Rate: 75 mL/hr (12/14/20 1020)          Telemetry: Sinus rhythm             Vital Signs  Blood pressure 111/64, pulse 58, temperature 98.3 °F (36.8 °C), temperature source Oral, resp. rate 16, height 177.8 cm (70\"), weight 67.6 kg (149 lb), SpO2 98 %.    Physical Exam:  General Appearance:   Well-developed elderly gentleman in no distress   Head:   Atraumatic   Eyes:          Pupils equal and reactive to light, no jaundice, conjunctiva pink   Ears:     Throat:  Oral mucosa dry   Neck:  Trachea midline, neck veins flat   Back:      Lungs:    Symmetric chest expansion without wheeze or rhonchi    Heart:   Regular rhythm, S1, S2 auscultated, no murmur   Abdomen:    Nondistended, bowel sounds present, soft   Rectal:   Deferred   Extremities:  Right femoral site with a palpable firm hematoma   Pulses:  Right pedal pulses present   Skin:  Warm and dry   Lymph nodes:    Neurologic:  Alert, oriented, speech fluent, no motor drift upper or lower extremity      Results Review:     I reviewed the patient's new clinical results.   Results from last 7 days   Lab Units 12/14/20  0710 12/07/20  1336   SODIUM mmol/L 140  --    POTASSIUM mmol/L 4.4  --    CHLORIDE mmol/L 103  --    CO2 mmol/L 28.0  --    BUN mg/dL 20  --    CREATININE mg/dL 1.39* 1.70*   CALCIUM mg/dL 10.3  --    GLUCOSE mg/dL 124*  --      Results from last 7 days   Lab Units 12/14/20  0710   WBC 10*3/mm3 9.23   HEMOGLOBIN g/dL 14.9   HEMATOCRIT % 44.9   PLATELETS 10*3/mm3 202         No results found for: BLOODCX  No results found for: URINECX    I reviewed the patient's new imaging including images and reports.    No new films to review  Currently undergoing a carotid duplex    All medications reviewed.   [START ON 12/15/2020] amLODIPine, 5 mg, Oral, Daily  aspirin, 81 mg, Oral, Daily  atorvastatin, 20 mg, Oral, " Nightly  carvedilol, 12.5 mg, Oral, BID With Meals  [START ON 12/15/2020] clopidogrel, 75 mg, Oral, Daily  ipratropium-albuterol, 3 mL, Nebulization, Q8H - RT  losartan, 100 mg, Oral, Daily  [START ON 12/15/2020] pantoprazole, 40 mg, Oral, Q AM  sodium chloride, 3 mL, Intravenous, Q12H          Assessment/Plan       Carotid stenosis, symptomatic w/o infarct, left    Essential hypertension    COPD (chronic obstructive pulmonary disease) (CMS/HCC)    Hematoma, right femoral    IHD (ischemic heart disease)    Dyslipidemia    Amaurosis fugax of left eye      #1 left carotid stenosis status post stent today.  He is neurologically intact    #2 right femoral hematoma, preoperative hemoglobin is 14.9.  Ultrasound is ordered    #3 history of COPD without purulent sputum or active wheezing.  Oxygenation is 100% on 2 L nasal cannula.  Quit smoking in 2004 and takes Anoro 1 puff daily as an outpatient    #4 history of ischemic heart disease currently without angina or evidence of heart failure.  Aspirin and Plavix and Zocor as an outpatient    #5 hypertension, currently with a normal blood pressure.  Losartan, Coreg, Norvasc as an outpatient currently on hold    PLAN:  Monitor serial H&H  Follow-up right femoral ultrasound  Follow-up carotid duplex  Monitor neurologic exam  Maintain systolic blood pressure less than 140  Resume aspirin and Plavix    VTE Prophylaxis: None    Stress Ulcer Prophylaxis: Protonix    Isabel Dotson MD  12/14/20  11:15 EST      Time: 25min

## 2020-12-14 NOTE — NURSING NOTE
"Noted at 1158, assessing patient's groin femoral site a \"soft-ball\" sized hematoma that was not present at 1100 assessment. Up until this point, no tenderness, or induration were noted; however, it is presently indurated. Asya came to bedside to assess while I held continuous pressure in the mean time  "

## 2020-12-14 NOTE — PLAN OF CARE
Goal Outcome Evaluation:  Plan of Care Reviewed With: patient  Progress: improving     Patient arrived to ICU post Right femoral catheterization with stent placement. Duplex resulted later revealed pseudoaneurysm. Large, indurated hematoma noted with paleness to femoral site. Pressure held x30 minutes which decreased size from 7 to 4cm, then 3cm presently. Site has increased violet ecchymosis. He verbally denies any complaints of pain, numbness, tingling, burning to any area. All pulses equally palpable bilaterally +2. Orders per Dr. Martinez to remain flat. He requires redirection after he has fallen asleep due to forgetfulness, but otherwise is A&Ox3 (not time).

## 2020-12-15 ENCOUNTER — APPOINTMENT (OUTPATIENT)
Dept: CARDIOLOGY | Facility: HOSPITAL | Age: 80
End: 2020-12-15

## 2020-12-15 ENCOUNTER — TELEPHONE (OUTPATIENT)
Dept: NEUROSURGERY | Facility: CLINIC | Age: 80
End: 2020-12-15

## 2020-12-15 VITALS
RESPIRATION RATE: 18 BRPM | HEART RATE: 66 BPM | DIASTOLIC BLOOD PRESSURE: 70 MMHG | TEMPERATURE: 97.2 F | BODY MASS INDEX: 21.33 KG/M2 | OXYGEN SATURATION: 90 % | WEIGHT: 149 LBS | HEIGHT: 70 IN | SYSTOLIC BLOOD PRESSURE: 121 MMHG

## 2020-12-15 LAB
ANION GAP SERPL CALCULATED.3IONS-SCNC: 9 MMOL/L (ref 5–15)
BASOPHILS # BLD AUTO: 0.04 10*3/MM3 (ref 0–0.2)
BASOPHILS NFR BLD AUTO: 0.6 % (ref 0–1.5)
BH CV RIGHT GROIN PSA PROCEDURE SCRIPTING LRR: 1
BH CV XLRA MEAS EXT ILIAC A PSV RIGHT: 211 CM/SEC
BUN SERPL-MCNC: 22 MG/DL (ref 8–23)
BUN/CREAT SERPL: 15.8 (ref 7–25)
CALCIUM SPEC-SCNC: 8.9 MG/DL (ref 8.6–10.5)
CHLORIDE SERPL-SCNC: 105 MMOL/L (ref 98–107)
CO2 SERPL-SCNC: 24 MMOL/L (ref 22–29)
CREAT SERPL-MCNC: 1.39 MG/DL (ref 0.76–1.27)
DEPRECATED RDW RBC AUTO: 40.4 FL (ref 37–54)
EOSINOPHIL # BLD AUTO: 0.25 10*3/MM3 (ref 0–0.4)
EOSINOPHIL NFR BLD AUTO: 3.7 % (ref 0.3–6.2)
ERYTHROCYTE [DISTWIDTH] IN BLOOD BY AUTOMATED COUNT: 12.4 % (ref 12.3–15.4)
GFR SERPL CREATININE-BSD FRML MDRD: 49 ML/MIN/1.73
GLUCOSE SERPL-MCNC: 96 MG/DL (ref 65–99)
HCT VFR BLD AUTO: 34 % (ref 37.5–51)
HCT VFR BLD AUTO: 35.3 % (ref 37.5–51)
HGB BLD-MCNC: 11.4 G/DL (ref 13–17.7)
HGB BLD-MCNC: 11.9 G/DL (ref 13–17.7)
IMM GRANULOCYTES # BLD AUTO: 0.02 10*3/MM3 (ref 0–0.05)
IMM GRANULOCYTES NFR BLD AUTO: 0.3 % (ref 0–0.5)
LYMPHOCYTES # BLD AUTO: 1.74 10*3/MM3 (ref 0.7–3.1)
LYMPHOCYTES NFR BLD AUTO: 25.4 % (ref 19.6–45.3)
MCH RBC QN AUTO: 30.2 PG (ref 26.6–33)
MCHC RBC AUTO-ENTMCNC: 33.5 G/DL (ref 31.5–35.7)
MCV RBC AUTO: 89.9 FL (ref 79–97)
MONOCYTES # BLD AUTO: 0.49 10*3/MM3 (ref 0.1–0.9)
MONOCYTES NFR BLD AUTO: 7.2 % (ref 5–12)
NEUTROPHILS NFR BLD AUTO: 4.3 10*3/MM3 (ref 1.7–7)
NEUTROPHILS NFR BLD AUTO: 62.8 % (ref 42.7–76)
NRBC BLD AUTO-RTO: 0 /100 WBC (ref 0–0.2)
PLATELET # BLD AUTO: 159 10*3/MM3 (ref 140–450)
PMV BLD AUTO: 10.4 FL (ref 6–12)
POTASSIUM SERPL-SCNC: 3.6 MMOL/L (ref 3.5–5.2)
PROX PFA PSV RIGHT: 373 CM/SEC
PROX SFA PSV RIGHT: 214 CM/SEC
RBC # BLD AUTO: 3.78 10*6/MM3 (ref 4.14–5.8)
RIGHT GROIN CFA SYS: 432 CM/SEC
SODIUM SERPL-SCNC: 138 MMOL/L (ref 136–145)
WBC # BLD AUTO: 6.84 10*3/MM3 (ref 3.4–10.8)

## 2020-12-15 PROCEDURE — 93926 LOWER EXTREMITY STUDY: CPT | Performed by: INTERNAL MEDICINE

## 2020-12-15 PROCEDURE — 85025 COMPLETE CBC W/AUTO DIFF WBC: CPT | Performed by: PHYSICIAN ASSISTANT

## 2020-12-15 PROCEDURE — 94799 UNLISTED PULMONARY SVC/PX: CPT

## 2020-12-15 PROCEDURE — 85014 HEMATOCRIT: CPT | Performed by: RADIOLOGY

## 2020-12-15 PROCEDURE — 99024 POSTOP FOLLOW-UP VISIT: CPT | Performed by: RADIOLOGY

## 2020-12-15 PROCEDURE — 85018 HEMOGLOBIN: CPT | Performed by: RADIOLOGY

## 2020-12-15 PROCEDURE — 93926 LOWER EXTREMITY STUDY: CPT

## 2020-12-15 PROCEDURE — 80048 BASIC METABOLIC PNL TOTAL CA: CPT | Performed by: PHYSICIAN ASSISTANT

## 2020-12-15 RX ORDER — POTASSIUM CHLORIDE 750 MG/1
20 CAPSULE, EXTENDED RELEASE ORAL ONCE
Status: COMPLETED | OUTPATIENT
Start: 2020-12-15 | End: 2020-12-15

## 2020-12-15 RX ORDER — CLOPIDOGREL BISULFATE 75 MG/1
75 TABLET ORAL DAILY
Qty: 30 TABLET | Refills: 1 | Status: SHIPPED | OUTPATIENT
Start: 2020-12-15 | End: 2021-02-15

## 2020-12-15 RX ADMIN — IPRATROPIUM BROMIDE AND ALBUTEROL SULFATE 3 ML: 2.5; .5 SOLUTION RESPIRATORY (INHALATION) at 07:59

## 2020-12-15 RX ADMIN — CLOPIDOGREL BISULFATE 75 MG: 75 TABLET ORAL at 08:05

## 2020-12-15 RX ADMIN — ASPIRIN 81 MG: 81 TABLET, FILM COATED ORAL at 08:05

## 2020-12-15 RX ADMIN — NICARDIPINE HYDROCHLORIDE 5 MG/HR: 0.1 INJECTION, SOLUTION INTRAVENOUS at 08:52

## 2020-12-15 RX ADMIN — POTASSIUM CHLORIDE 20 MEQ: 10 CAPSULE, COATED, EXTENDED RELEASE ORAL at 09:52

## 2020-12-15 RX ADMIN — PANTOPRAZOLE SODIUM 40 MG: 40 TABLET, DELAYED RELEASE ORAL at 05:41

## 2020-12-15 RX ADMIN — ACETAMINOPHEN 650 MG: 325 TABLET, FILM COATED ORAL at 11:44

## 2020-12-15 RX ADMIN — CARVEDILOL 12.5 MG: 12.5 TABLET, FILM COATED ORAL at 08:05

## 2020-12-15 RX ADMIN — SODIUM CHLORIDE 50 ML/HR: 9 INJECTION, SOLUTION INTRAVENOUS at 05:48

## 2020-12-15 RX ADMIN — LOSARTAN POTASSIUM 100 MG: 50 TABLET, FILM COATED ORAL at 08:05

## 2020-12-15 RX ADMIN — AMLODIPINE BESYLATE 5 MG: 5 TABLET ORAL at 08:05

## 2020-12-15 RX ADMIN — SODIUM CHLORIDE, PRESERVATIVE FREE 3 ML: 5 INJECTION INTRAVENOUS at 08:04

## 2020-12-15 RX ADMIN — NICARDIPINE HYDROCHLORIDE 5 MG/HR: 0.1 INJECTION, SOLUTION INTRAVENOUS at 00:50

## 2020-12-15 NOTE — DISCHARGE SUMMARY
"DISCHARGE SUMMARY  PATIENT:  FORTUNATO ALLISON  YOB: 1940  VISIT ID:  [id number]  PRIMARY CARE:  Rocky Morel MD  ADMITTING PHYSICIAN:  Shweta att. providers found    DATE OF ADMISSION:  12/14/2020  DATE OF DISCHARGE: 12/15/2020      ADMITTING DIAGNOSIS:  Carotid stenosis, symptomatic without infarct, left  Amaurosis fugax left eye    DISCHARGE DIAGNOSIS:  Carotid stenosis, symptomatic without infarct, left  Amaurosis fugax left eye    Past Medical History:   Diagnosis Date   • Acid reflux    • Carotid stenosis, symptomatic w/o infarct, left 11/2020    left sided amaurosis fugax   • COPD (chronic obstructive pulmonary disease) (CMS/Formerly [facility])    • Heart failure (CMS/Formerly [facility])    • Hyperlipidemia    • Hypertension    • IHD (ischemic heart disease)          PROCEDURES:  Left carotid stent    BRIEF HOSPITAL COURSE:  Mr. Allison is a 80 y.o. male who was recently seen in the neuro interventional clinic for evaluation of symptomatic, high-grade left carotid stenosis.  In mid November 2020, patient experienced 10-15-second episode of left-sided amaurosis fugax.  Since that episode, patient denies any additional recurrent stroke or TIA-like symptoms.  However, he does note some dizziness.  The CT angiogram demonstrated greater than 80%, symptomatic left carotid lesion, along with a probable \"isolated\" left MCA vascular territory, depended upon the left carotid for perfusion, and therefore was deemed a \"high surgical risk\".  Due to the lack of appreciable collateral flow, angioplasty/stent placement is the optimal treatment for his symptomatic left carotid disease.  Therefore, patient was instructed to remain on dual antiplatelet therapy and plan to proceed with a angioplasty/stent placement.  Therefore, patient presented to [facility] on 12/14/2020.  Angioplasty confirmed bulky calcific plaque at the left carotid bifurcation and extending into the internal carotid artery origin, producing " "asymptomatic high-grade (greater than 90%) stenosis.  Angioplasty/stent placement restored a near normal caliber lumen and resulted in improved perfusion of the left cerebral hemisphere.  The procedure demonstrated no apparent complications.  However, patient developed a right groin hematoma postoperatively and therefore pressure was held for 30 minutes and stat H&H was obtained.  After pressure, there was evidence of a small superficial hematoma, however the right groin was otherwise soft with distal pulses present.  H/H demonstrated a small, expected procedure related blood loss.  Therefore, patient was placed on bedrest throughout the night.  Overnight, patient had no new \"external\" bleeding overnight and the groin axis did appear slightly more \"puffy\" with slight expansion of ecchymosis/bruising.  Therefore ultrasound of the right groin was performed which demonstrated no appreciable subcutaneous hematoma and no acute common femoral artery pseudoaneurysm.  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  This was seen on the preintervention groin access angiogram.  The bruising/ecchymosis at the right common femoral artery remained stable.  The patient had noted low blood pressures, however, had remained asymptomatic. The patient was then ambulated without issue and H&H stop trending downwards and started in the upward direction.      Therefore, the patient was discharged home at his neurological baseline.  The patient's losartan was held, however, he was instructed to continue the carvedilol.  We will have him follow-up in 1 week's time via telephone to check on his blood pressure.  Patient was instructed to continue to check blood pressure at home.  If systolic blood pressure is over 140, patient was instructed to restart his losartan.  Will instruct patient to continue to follow-up with cardiology/PCP in regards to these medications.  After that, we will have him " follow-up in 6 weeks with a visit in office or via phone to talk about discontinuing Plavix.  Patient noted understanding of this plan and willing to proceed.  DISCHARGE MEDICATIONS:     Discharge Medications      Continue These Medications      Instructions Start Date   amLODIPine 5 MG tablet  Commonly known as: NORVASC   5 mg, Oral, Daily      Anoro Ellipta 62.5-25 MCG/INH aerosol powder  inhaler  Generic drug: umeclidinium-vilanterol   1 puff, Inhalation, Daily      aspirin 81 MG tablet   81 mg, Oral, Daily      carvedilol 12.5 MG tablet  Commonly known as: COREG   12.5 mg, Oral, 2 Times Daily      clopidogrel 75 MG tablet  Commonly known as: PLAVIX   75 mg, Oral, Daily      omeprazole 40 MG capsule  Commonly known as: priLOSEC   Daily      simvastatin 40 MG tablet  Commonly known as: ZOCOR   Daily         Stop These Medications    losartan 50 MG tablet  Commonly known as: COZAAR              ACTIVITY:  No heavy lifting (greater than 15 pounds) for the next week  Showers are okay.  No bathing or soaking for the next week.    DIET:  Advance as tolerated    FOLLOW UP:    Follow-up Information     Asya Milligan PA-C Follow up in 1 week(s).    Specialties: Physician Assistant, Neurosurgery  Why: Pt to follow up with Asya on December   Contact information:  [address] [address] [phone]             Rocky Morel MD .    Specialty: Family Medicine  Contact information:  [place] [address] [phone]                   The discharge summary took approximately 35 minutes for reviewing the studies, patient education, and documentation

## 2020-12-15 NOTE — PLAN OF CARE
Goal Outcome Evaluation:  Plan of Care Reviewed With: patient  Progress: no change  Outcome Summary: neurologically intact except pt is very forgetful (often asks the same question multiple times and forgets he can't get out of bed). Small hematoma felt under groin site dressing at 0400. pressure held for 15 mins until soft. all rechecks so far have been stable without hematoma growth signs. Pt got restless around 2am trying to get comfortable which could likely be reasoning for hematoma. pt has remained fairly still since with no signs of rebleed yet. Cardene on momentarily through shift (currently infusing) to control BP. Pt having diarrhea x 3 tonight. gracie HR in 50s mostly sinus rhythm. afebrile.

## 2020-12-15 NOTE — PROGRESS NOTES
Ultrasound of right groin access site demonstrates no appreciable subcutaneous hematoma and no acute common femoral artery pseudoaneurysm.  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    Bruising/ecchymosis at the right common femoral access site is stable.  Repeat H&H is pending.  We are going to ambulate the patient.  If he does well ambulating, and his H&H is stable, will plan on discharge home today.

## 2020-12-15 NOTE — PROGRESS NOTES
Multidisciplinary Rounds    Patient Name: Lucio Castañeda  Date of Encounter: 12/15/20 09:30 EST  MRN: [id number]  Admission date: 12/14/2020    Reason for visit: MDR. RD to continue to follow per protocol.     Additional information obtained during MDR: patient with left carotid stenosis status post stent   (12/14). Post op right femoral hematoma.  Neurologically intact except pt is very forgetful.  Possible discharge home later today.     Current diet: Diet Regular; Cardiac  Supplement: No active supplement orders    Evaluation of Received Nutrient/Fluid Intake:  1 Day:  63% x 2 meals      EMR reviewed     Intervention:  Follow treatment plan  Care plan reviewed    Follow up:   Per protocol      Judit Parra RD  09:30 EST  Time: 10min

## 2020-12-15 NOTE — PROGRESS NOTES
Discharge Planning Assessment  [facility]     Patient Name: Lucio Castañeda  MRN: [id number]  Today's Date: 12/15/2020    Admit Date: 12/14/2020    Discharge Needs Assessment     Row Name 12/15/20 1221       Living Environment    Lives With  spouse    Current Living Arrangements  home/apartment/condo    Primary Care Provided by  self    Family Caregiver if Needed  spouse    Able to Return to Prior Arrangements  yes       Resource/Environmental Concerns    Transportation Concerns  car, none       Transition Planning    Patient/Family Anticipates Transition to  home with family    Transportation Anticipated  family or friend will provide       Discharge Needs Assessment    Equipment Currently Used at Home  none        Discharge Plan     Row Name 12/15/20 1221       Plan    Plan  Home    Patient/Family in Agreement with Plan  yes    Plan Comments  I met with Mr. Sheldon at bedside to discuss discharge planning.  He resides in [place] with his spouse.  Independent with ADL's.  Still drives his car.  No DME.  Has RX coverage.  no needs identified.  Daughter to transport home.    Final Discharge Disposition Code  01 - home or self-care        Continued Care and Services - Admitted Since 12/14/2020    Coordination has not been started for this encounter.       Expected Discharge Date and Time     Expected Discharge Date Expected Discharge Time    Dec 15, 2020         Demographic Summary     Row Name 12/15/20 1218       General Information    Admission Type  inpatient    Reason for Consult  discharge planning    General Information Comments  PCP - Rocky Morel        Functional Status     Row Name 12/15/20 1220       Functional Status    Usual Activity Tolerance  moderate    Current Activity Tolerance  moderate       Functional Status, IADL    Medications  independent    Meal Preparation  independent    Housekeeping  independent    Laundry  independent    Shopping  independent        Psychosocial    No  documentation.       Abuse/Neglect    No documentation.       Legal    No documentation.       Substance Abuse    No documentation.       Patient Forms    No documentation.           Allison Jarvis RN

## 2020-12-15 NOTE — PROGRESS NOTES
"NAME: FORTUNATO ALLISON  : 1940  PCP: Rocky Morel MD  ADMITTING PHYSICIAN: Edu Montilla MD    DATE OF ADMISSION:  2020  DATE OF SERVICE: 12/15/2020    HOSPITAL DAY:  1 day    HISTORY OF PRESENT ILLNESS:  80 y.o. male status post left carotid angioplasty/stent placement for high-grade, symptomatic left carotid stenosis.  He is doing well from a neurologic standpoint, but has had a small right groin \"superficial\" hematoma post procedure.    REVIEW OF IMAGING:  Carotid duplex post procedure demonstrates widely patent left carotid stent without residual hemodynamically significant disease.    LABS:  Lab Results   Component Value Date    WBC 6.84 12/15/2020    HGB 11.4 (L) 12/15/2020    HCT 34.0 (L) 12/15/2020    MCV 89.9 12/15/2020     12/15/2020     Lab Results   Component Value Date    GLUCOSE 96 12/15/2020    CALCIUM 8.9 12/15/2020     12/15/2020    K 3.6 12/15/2020    CO2 24.0 12/15/2020     12/15/2020    BUN 22 12/15/2020    CREATININE 1.39 (H) 12/15/2020    EGFRIFNONA 49 (L) 12/15/2020    BCR 15.8 12/15/2020    ANIONGAP 9.0 12/15/2020       CURRENT MEDS:  Current Facility-Administered Medications   Medication Dose Route Frequency Provider Last Rate Last Admin   • acetaminophen (TYLENOL) tablet 650 mg  650 mg Oral Q6H PRN Ki Martinez, JUAN DANIEL   650 mg at 20 1649   • amLODIPine (NORVASC) tablet 5 mg  5 mg Oral Daily Asya Milligan PA-C   5 mg at 12/15/20 08   • aspirin EC tablet 81 mg  81 mg Oral Daily PinnixAsya PA-C   81 mg at 12/15/20 08   • atorvastatin (LIPITOR) tablet 20 mg  20 mg Oral Nightly PinnixAsya PA-C   20 mg at 20   • carvedilol (COREG) tablet 12.5 mg  12.5 mg Oral BID With Meals PinAsya peace PA-C   12.5 mg at 12/15/20 08   • clopidogrel (PLAVIX) tablet 75 mg  75 mg Oral Daily Asya Milligan PA-C   75 mg at 12/15/20 08   • ipratropium-albuterol (DUO-NEB) nebulizer solution 3 mL  3 mL " "Nebulization Q8H - RT Pinnix, Asya, PA-C   3 mL at 12/15/20 0759   • losartan (COZAAR) tablet 100 mg  100 mg Oral Daily Pinnix, Asya, PA-C   100 mg at 12/15/20 0805   • niCARdipine (CARDENE) 20 mg in 200 mL NS infusion  5-15 mg/hr Intravenous Titrated Pinnix, Asya, PA-C 100 mL/hr at 12/15/20 0904 10 mg/hr at 12/15/20 0904   • pantoprazole (PROTONIX) EC tablet 40 mg  40 mg Oral Q AM Pinnix, Asya, PA-C   40 mg at 12/15/20 0541   • phenylephrine (NEGRITA-SYNEPHRINE) 50 mg in 250 mL NS infusion  0.5-3 mcg/kg/min Intravenous Titrated Pinnix, Asya, PA-C   Stopped at 12/14/20 1337   • potassium chloride (MICRO-K) CR capsule 20 mEq  20 mEq Oral Once Isabel Dotson MD       • sodium chloride 0.9 % flush 10 mL  10 mL Intravenous PRN Pinnix, Asya, PA-C       • sodium chloride 0.9 % flush 3 mL  3 mL Intravenous Q12H Pinnix, Asya, PA-C   3 mL at 12/15/20 0804   • sodium chloride 0.9 % infusion  50 mL/hr Intravenous Continuous Pinnix, Asya, PA-C 50 mL/hr at 12/15/20 0548 50 mL/hr at 12/15/20 0548       PHYSICAL EXAM:  Vitals:    12/15/20 0805   BP: 130/71   Pulse:    Resp:    Temp:    SpO2:       Alert and oriented x3.  Nonfocal neurologic exam.  Speech is clear.  Tolerating p.o. intake very well.  Excellent strength in upper and lower extremities.  No pronator drift.    ASSESSMENT/PLAN:  Mr. Castañeda is a 80 y.o. male status post angioplasty/stent placement for symptomatic, high-grade left carotid stenosis.  He has developed a small, superficial hematoma at the right groin access site.  He has had no new \"external\" bleeding overnight, but the groin access site is slightly more \"puffy\" today, with slight expansion of ecchymosis/bruising.    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  His H/H has been relatively stable since his initial " hematoma.  We will plan on repeating ultrasound today, to ensure that there is not a developing/new pseudoaneurysm.  If his ultrasound is unchanged, then we will ambulate the patient and repeat an H/H at noon.  Pending results of all these studies, I anticipate Mr. Castañeda being able to discharge later this evening or potentially tomorrow.

## 2020-12-15 NOTE — PLAN OF CARE
Goal Outcome Evaluation:  Plan of Care Reviewed With: patient  Progress: no change     Patient set to discharge with daughter accompanying. Meds and clothing/belongings returned. All education completed by Dr. Martinez and myself.

## 2020-12-15 NOTE — NURSING NOTE
Security called about a small key on a black wristband with the number 7 on it. Pt states it is a key to a locker with his belongings in it that the cath lab employees gave him. According to pt, belongings in locker include pants, shirt, underwear, a jacket, a wallet, glasses, and possibly shoes. Security investigated and found a #7 locker with a lock on it and instructed me to let pt keep the key tonight and have dayshift RN call cath lab tomorrow in AM when staff is there to obtain belongings.

## 2020-12-16 ENCOUNTER — READMISSION MANAGEMENT (OUTPATIENT)
Dept: CALL CENTER | Facility: HOSPITAL | Age: 80
End: 2020-12-16

## 2020-12-16 NOTE — OUTREACH NOTE
Prep Survey      Responses   [facility]  [place]   Is LACE score < 7 ?  No   Eligibility  Readm Mgmt   Discharge diagnosis  Carotid stenosis, symptomatic without infarct [s/p left carotid stent]   Does the patient have one of the following disease processes/diagnoses(primary or secondary)?  Other   Does the patient have Home health ordered?  No   Is there a DME ordered?  No   Comments regarding appointments  Needs f/u scheduled   Medication alerts for this patient  continue aspirin and plavix   Prep survey completed?  Yes          Hue Moore RN

## 2020-12-21 ENCOUNTER — READMISSION MANAGEMENT (OUTPATIENT)
Dept: CALL CENTER | Facility: HOSPITAL | Age: 80
End: 2020-12-21

## 2020-12-21 NOTE — OUTREACH NOTE
Medical Week 1 Survey      Responses   [facility] patient discharged from?  [place]   Does the patient have one of the following disease processes/diagnoses(primary or secondary)?  Other   Week 1 attempt successful?  No   Unsuccessful attempts  Attempt 1          Yari Marie RN

## 2020-12-22 ENCOUNTER — READMISSION MANAGEMENT (OUTPATIENT)
Dept: CALL CENTER | Facility: HOSPITAL | Age: 80
End: 2020-12-22

## 2020-12-22 NOTE — OUTREACH NOTE
Medical Week 1 Survey      Responses   [facility] patient discharged from?  [place]   Does the patient have one of the following disease processes/diagnoses(primary or secondary)?  Other   Week 1 attempt successful?  Yes   Call start time  1105   Rescheduled  Rescheduled-pt requested [daughter requested call back later to patient at [phone]]   Discharge diagnosis  Carotid stenosis, symptomatic without infarct   Person spoke with today (if not patient) and relationship  Araceli, daughter          Tamera Tom RN

## 2020-12-23 ENCOUNTER — READMISSION MANAGEMENT (OUTPATIENT)
Dept: CALL CENTER | Facility: HOSPITAL | Age: 80
End: 2020-12-23

## 2020-12-23 NOTE — OUTREACH NOTE
Medical Week 2 Survey      Responses   [facility] patient discharged from?  [place]   Does the patient have one of the following disease processes/diagnoses(primary or secondary)?  Other   Week 2 attempt successful?  No   Unsuccessful attempts  Attempt 1          Jessica Sam RN

## 2020-12-29 ENCOUNTER — READMISSION MANAGEMENT (OUTPATIENT)
Dept: CALL CENTER | Facility: HOSPITAL | Age: 80
End: 2020-12-29

## 2020-12-29 NOTE — OUTREACH NOTE
Medical Week 2 Survey      Responses   [facility] patient discharged from?  [place]   Does the patient have one of the following disease processes/diagnoses(primary or secondary)?  Other   Week 2 attempt successful?  Yes   Call start time  1158   Discharge diagnosis  Carotid stenosis, symptomatic without infarct   Call end time  1200   Person spoke with today (if not patient) and relationship  katie Charles   Meds reviewed with patient/caregiver?  Yes   Is the patient having any side effects they believe may be caused by any medication additions or changes?  No   Does the patient have all medications ordered at discharge?  Yes   Is the patient taking all medications as directed (includes completed medication regime)?  Yes   Does the patient have a primary care provider?   Yes   Does the patient have an appointment with their PCP within 7 days of discharge?  Yes   Has the patient kept scheduled appointments due by today?  Yes   Has home health visited the patient within 72 hours of discharge?  N/A   Psychosocial issues?  No   Comments  daughter states pt feeling much better since hospital stay, has energy, is currently out running with his dogs   Did the patient receive a copy of their discharge instructions?  Yes   Nursing interventions  Reviewed instructions with patient   What is the patient's perception of their health status since discharge?  Improving   Is the patient/caregiver able to teach back signs and symptoms related to disease process for when to call PCP?  Yes   Is the patient/caregiver able to teach back signs and symptoms related to disease process for when to call 911?  Yes   Is the patient/caregiver able to teach back the hierarchy of who to call/visit for symptoms/problems? PCP, Specialist, Home health nurse, Urgent Care, ED, 911  Yes   Week 2 Call Completed?  Yes          Tamera Tom RN

## 2021-01-21 RX ORDER — AMLODIPINE BESYLATE 5 MG/1
TABLET ORAL
Qty: 90 TABLET | Refills: 12 | Status: SHIPPED | OUTPATIENT
Start: 2021-01-21

## 2021-02-15 RX ORDER — CLOPIDOGREL BISULFATE 75 MG/1
TABLET ORAL
Qty: 60 TABLET | Refills: 1 | Status: SHIPPED | OUTPATIENT
Start: 2021-02-15 | End: 2021-06-15

## 2021-02-15 NOTE — TELEPHONE ENCOUNTER
Provider: Eladia   Caller: Automated  Surgery:  Left carotid stent  Surgery Date:  12/14/20  Last visit:  12/7/20   Next visit: Not scheduled     Reason for call:       Requested Prescriptions     Pending Prescriptions Disp Refills   • clopidogrel (PLAVIX) 75 MG tablet [Pharmacy Med Name: CLOPIDOGREL 75 MG TABLET 75 Tablet] 60 tablet 1     Sig: TAKE ONE TABLET BY MOUTH DAILY

## 2021-06-15 DIAGNOSIS — I65.22 CAROTID STENOSIS, SYMPTOMATIC W/O INFARCT, LEFT: Primary | ICD-10-CM

## 2021-06-15 RX ORDER — CLOPIDOGREL BISULFATE 75 MG/1
TABLET ORAL
Qty: 30 TABLET | Refills: 6 | Status: SHIPPED | OUTPATIENT
Start: 2021-06-15 | End: 2022-10-06

## 2021-06-15 NOTE — TELEPHONE ENCOUNTER
Provider: Eladia   Caller: Automated  Surgery:  Left carotid stent  Surgery Date:  12/14/20  Last visit:  12/7/20   Next visit: NA    Reason for call:  Requested Prescriptions     Pending Prescriptions Disp Refills   • clopidogrel (PLAVIX) 75 MG tablet [Pharmacy Med Name: CLOPIDOGREL 75 MG TABLET 75 Tablet] 30 tablet 1     Sig: TAKE ONE TABLET BY MOUTH DAILY

## 2021-07-28 ENCOUNTER — APPOINTMENT (OUTPATIENT)
Dept: CARDIOLOGY | Facility: HOSPITAL | Age: 81
End: 2021-07-28

## 2021-09-01 ENCOUNTER — TELEPHONE (OUTPATIENT)
Dept: NEUROSURGERY | Facility: CLINIC | Age: 81
End: 2021-09-01

## 2021-09-01 NOTE — TELEPHONE ENCOUNTER
Caller: Lucio Castañeda    Relationship: Self    Best call back number:     What orders are you requesting (i.e. lab or imaging): CAROTID VT TEST AND THEN CAROTID DUPLEX FU W/CAROTID DUPLEX  (GIVEN)     In what timeframe would the patient need to come in: ASAP/FLEXIBLE.    Where will you receive your lab/imaging services: [facility] [address] [place] [address] [phone]    Additional notes: TESTING CENTER IS MUCH CLOSER TO PATIENT'S HOME, HE'D LIKE TO HAVE THE ORDERS FOR THE CAROTID VT TEST SENT THERE AND THEN A FOLLOW-UP SCHEDULED WITH SAM NORIEGA AFTER TESTING.

## 2021-09-01 NOTE — TELEPHONE ENCOUNTER
Provider:  Eladia  Surgery:  Carotid stent  Surgery Date:  Brief Op Note by Edu Montilla MD (12/14/2020 08:01)    Last visit:   Office Visit with Edu Montilla MD (12/07/2020)    Next visit: TBD    Reason for call:     Pt wanting to have f/u carotid duplex closer to home.

## 2021-09-01 NOTE — TELEPHONE ENCOUNTER
Okay, for patient to obtain carotid duplex closer to home.  Just make sure patient brings report and disc of imaging.      Other option for patient is to have the carotid duplex performed the same day as a follow-up appointment.

## 2021-09-22 ENCOUNTER — OFFICE VISIT (OUTPATIENT)
Dept: NEUROSURGERY | Facility: CLINIC | Age: 81
End: 2021-09-22

## 2021-09-22 VITALS
SYSTOLIC BLOOD PRESSURE: 136 MMHG | BODY MASS INDEX: 19.61 KG/M2 | WEIGHT: 137 LBS | HEIGHT: 70 IN | DIASTOLIC BLOOD PRESSURE: 84 MMHG | TEMPERATURE: 97.6 F

## 2021-09-22 DIAGNOSIS — I65.22 CAROTID STENOSIS, SYMPTOMATIC W/O INFARCT, LEFT: Primary | ICD-10-CM

## 2021-09-22 DIAGNOSIS — G45.3 AMAUROSIS FUGAX OF LEFT EYE: ICD-10-CM

## 2021-09-22 PROCEDURE — 99214 OFFICE O/P EST MOD 30 MIN: CPT | Performed by: PHYSICIAN ASSISTANT

## 2021-09-22 NOTE — PROGRESS NOTES
"NAME: FORTUNATO ALLISON   DOS: 2021  : 1940  PCP: Rocky Morel MD    Chief Complaint:  Carotid stenosis      History of Present Illness: Mr. Allison is a 80 y.o. male who is well-known to the neuro interventional clinic for initially being seen for asymptomatic, high-grade left carotid stenosis.  Patient noted in 2020 he experienced 10-15-second episode of left-sided amaurosis fugax.  In work-up, patient was found to have a approximately 80%, symptomatic left carotid stenosis with a noted \"isolated\" left MCA vascular territory.  Therefore, he was deemed a \"high surgical risk\" and was deemed appropriate to pursue angioplasty/stent placement.  Therefore, patient underwent left carotid stent placement in 2020.  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  Therefore, patient was discharged home at his neurologic baseline.    Today, patient states that he is doing well.  Denies any TIA or strokelike symptoms, specifically unilateral weakness/numbness or changes in vision or speech.  He is remained on Plavix and aspirin without issue.  He is seen today in follow-up with a carotid duplex.      Past Medical History:   Diagnosis Date   • Acid reflux    • Carotid stenosis, symptomatic w/o infarct, left 2020    left sided amaurosis fugax   • COPD (chronic obstructive pulmonary disease) (CMS/HCC)    • Heart failure (CMS/[facility])    • Hyperlipidemia    • Hypertension    • IHD (ischemic heart disease)        Past Surgical History:   Procedure Laterality Date   • BLADDER TUMOR EXCISION     • CAROTID STENT N/A 2020    Procedure: Carotid Stent;  Surgeon: Edu Montilla MD;  Location: [facility] CATH INVASIVE LOCATION;  Service: Interventional Radiology;  Laterality: N/A;   • COLON SURGERY               Review of Systems   HENT: Positive for hearing loss.  "   Psychiatric/Behavioral: Positive for sleep disturbance.   All other systems reviewed and are negative.           Medications:    Current Outpatient Medications:   •  amLODIPine (NORVASC) 5 MG tablet, TAKE 1 TABLET BY MOUTH DAILY., Disp: 90 tablet, Rfl: 12  •  ANORO ELLIPTA 62.5-25 MCG/INH aerosol powder  inhaler, Inhale 1 puff Daily., Disp: , Rfl: 3  •  aspirin 81 MG tablet, Take 81 mg by mouth Daily., Disp: , Rfl:   •  carvedilol (COREG) 12.5 MG tablet, Take 12.5 mg by mouth 2 (Two) Times a Day., Disp: , Rfl: 12  •  clopidogrel (PLAVIX) 75 MG tablet, TAKE ONE TABLET BY MOUTH DAILY, Disp: 30 tablet, Rfl: 6  •  omeprazole (priLOSEC) 40 MG capsule, Daily., Disp: , Rfl:   •  simvastatin (ZOCOR) 40 MG tablet, Daily., Disp: , Rfl:     Allergies:  Allergies   Allergen Reactions   • Morphine Hallucinations       Social History     Tobacco Use   • Smoking status: Former Smoker     Packs/day: 1.00     Types: Cigarettes     Quit date: 7/10/2004     Years since quittin.2   • Smokeless tobacco: Current User     Types: Snuff   Substance Use Topics   • Alcohol use: No   • Drug use: No       Family History   Problem Relation Age of Onset   • Hypertension Mother    • Cancer Father        Review of Imaging:  Carotid duplex that was performed at [facility] on 2021 was reviewed along with the corresponding radiology report.  Study demonstrates peak velocities of the right vasculature as 83/27 cm/second with an ICA/CCA ratio of 1.3.  Peak velocities of the left vasculature as 76/31 cm/second with an ICA/CCA ratio of 0.9.  No abnormality demonstrated of waveforms.    Vitals:    21 1135   BP: 136/84   Temp: 97.6 °F (36.4 °C)     Body mass index is 19.66 kg/m².    Physical Exam  Constitutional:       Appearance: He is normal weight.   HENT:      Head: Normocephalic and atraumatic.   Neurological:      Mental Status: He is alert and oriented to person, place, and time. Mental status is at baseline.       Gait: Gait is intact.      Deep Tendon Reflexes: Strength normal.   Psychiatric:         Mood and Affect: Mood normal.         Speech: Speech normal.         Behavior: Behavior normal.       Neurologic Exam     Mental Status   Oriented to person, place, and time.   Speech: speech is normal     Cranial Nerves   Cranial nerves II through XII intact.     Motor Exam   Muscle bulk: normal  Overall muscle tone: normal  Right arm pronator drift: absent  Left arm pronator drift: absent    Strength   Strength 5/5 throughout.     Sensory Exam   Light touch normal.     Gait, Coordination, and Reflexes     Gait  Gait: normal      Diagnoses/Plan:    Mr. Castañeda is a 80 y.o. male who is seen today status post left carotid stent placement.  After reviewing the studies, patient's peak velocities of the right vasculature as 83/27 with a ratio of 1.3 and peak velocities of less vasculature as 76/31 with a ratio of 0.9.  Therefore, it was offered to patient to discontinue his Plavix usage, however, patient notes that he is doing well and would rather stay on it at this time.  We will have patient follow-up in 1 years time with a carotid duplex to ensure stability and no further progression of disease.  Signs and symptoms were reviewed with patient that would warrant a sooner call to our clinic and/or 9 1.  Patient notes understanding and willing to proceed.      Patient's Body mass index is 19.66 kg/m². indicating that he is within normal range (BMI 18.5-24.9). No BMI management plan needed..      Asya Milligan PA-C

## 2022-04-21 RX ORDER — AMLODIPINE BESYLATE 5 MG/1
TABLET ORAL
Qty: 90 TABLET | Refills: 12 | OUTPATIENT
Start: 2022-04-21

## 2022-10-06 RX ORDER — CLOPIDOGREL BISULFATE 75 MG/1
TABLET ORAL
Qty: 90 TABLET | Refills: 3 | Status: SHIPPED | OUTPATIENT
Start: 2022-10-06

## 2022-12-02 ENCOUNTER — OFFICE VISIT (OUTPATIENT)
Dept: FAMILY MEDICINE CLINIC | Facility: CLINIC | Age: 82
End: 2022-12-02

## 2022-12-02 VITALS
HEART RATE: 63 BPM | WEIGHT: 132.2 LBS | OXYGEN SATURATION: 99 % | BODY MASS INDEX: 18.92 KG/M2 | HEIGHT: 70 IN | TEMPERATURE: 99.3 F | SYSTOLIC BLOOD PRESSURE: 106 MMHG | DIASTOLIC BLOOD PRESSURE: 68 MMHG

## 2022-12-02 DIAGNOSIS — J10.1 INFLUENZA A: ICD-10-CM

## 2022-12-02 DIAGNOSIS — J43.1 PANLOBULAR EMPHYSEMA: ICD-10-CM

## 2022-12-02 DIAGNOSIS — E46 PROTEIN-CALORIE MALNUTRITION, UNSPECIFIED SEVERITY: ICD-10-CM

## 2022-12-02 DIAGNOSIS — R05.1 ACUTE COUGH: Primary | ICD-10-CM

## 2022-12-02 LAB
EXPIRATION DATE: ABNORMAL
FLUAV AG UPPER RESP QL IA.RAPID: DETECTED
FLUBV AG UPPER RESP QL IA.RAPID: NOT DETECTED
INTERNAL CONTROL: ABNORMAL
Lab: ABNORMAL
SARS-COV-2 AG UPPER RESP QL IA.RAPID: NOT DETECTED

## 2022-12-02 PROCEDURE — 99213 OFFICE O/P EST LOW 20 MIN: CPT | Performed by: FAMILY MEDICINE

## 2022-12-02 PROCEDURE — 87428 SARSCOV & INF VIR A&B AG IA: CPT | Performed by: FAMILY MEDICINE

## 2022-12-02 RX ORDER — AMLODIPINE BESYLATE 10 MG/1
10 TABLET ORAL DAILY
COMMUNITY
Start: 2022-11-18

## 2022-12-02 RX ORDER — FLUTICASONE FUROATE, UMECLIDINIUM BROMIDE AND VILANTEROL TRIFENATATE 200; 62.5; 25 UG/1; UG/1; UG/1
POWDER RESPIRATORY (INHALATION)
COMMUNITY
Start: 2022-11-11

## 2022-12-02 RX ORDER — OMEPRAZOLE 40 MG/1
CAPSULE, DELAYED RELEASE ORAL
Qty: 90 CAPSULE | Refills: 3 | Status: SHIPPED | OUTPATIENT
Start: 2022-12-02

## 2022-12-02 RX ORDER — PANTOPRAZOLE SODIUM 40 MG/1
40 TABLET, DELAYED RELEASE ORAL DAILY
COMMUNITY
Start: 2022-09-03

## 2022-12-02 RX ORDER — OSELTAMIVIR PHOSPHATE 75 MG/1
75 CAPSULE ORAL 2 TIMES DAILY
Qty: 10 CAPSULE | Refills: 0 | Status: SHIPPED | OUTPATIENT
Start: 2022-12-02 | End: 2023-03-20

## 2022-12-02 RX ORDER — BENZONATATE 100 MG/1
200 CAPSULE ORAL 3 TIMES DAILY PRN
Qty: 30 CAPSULE | Refills: 0 | Status: SHIPPED | OUTPATIENT
Start: 2022-12-02 | End: 2023-03-20

## 2023-03-06 ENCOUNTER — OFFICE VISIT (OUTPATIENT)
Dept: FAMILY MEDICINE CLINIC | Facility: CLINIC | Age: 83
End: 2023-03-06
Payer: MEDICARE

## 2023-03-06 VITALS
HEIGHT: 70 IN | DIASTOLIC BLOOD PRESSURE: 78 MMHG | RESPIRATION RATE: 20 BRPM | BODY MASS INDEX: 18.04 KG/M2 | TEMPERATURE: 98 F | HEART RATE: 70 BPM | WEIGHT: 126 LBS | OXYGEN SATURATION: 93 % | SYSTOLIC BLOOD PRESSURE: 128 MMHG

## 2023-03-06 DIAGNOSIS — E78.5 DYSLIPIDEMIA: ICD-10-CM

## 2023-03-06 DIAGNOSIS — R73.01 IMPAIRED FASTING GLUCOSE: ICD-10-CM

## 2023-03-06 DIAGNOSIS — R93.89 ABNORMAL FINDINGS ON DIAGNOSTIC IMAGING OF OTHER SPECIFIED BODY STRUCTURES: ICD-10-CM

## 2023-03-06 DIAGNOSIS — I25.112 ATHEROSCLEROSIS OF NATIVE CORONARY ARTERY OF NATIVE HEART WITH REFRACTORY ANGINA PECTORIS: ICD-10-CM

## 2023-03-06 DIAGNOSIS — I25.9 IHD (ISCHEMIC HEART DISEASE): ICD-10-CM

## 2023-03-06 DIAGNOSIS — J13 PNEUMONIA OF LEFT LOWER LOBE DUE TO STREPTOCOCCUS PNEUMONIAE: Primary | ICD-10-CM

## 2023-03-06 DIAGNOSIS — I10 ESSENTIAL HYPERTENSION: ICD-10-CM

## 2023-03-06 LAB
DEPRECATED RDW RBC AUTO: 40.3 FL (ref 37–54)
ERYTHROCYTE [DISTWIDTH] IN BLOOD BY AUTOMATED COUNT: 12.5 % (ref 12.3–15.4)
HBA1C MFR BLD: 6 % (ref 4.8–5.6)
HCT VFR BLD AUTO: 38.8 % (ref 37.5–51)
HGB BLD-MCNC: 13.5 G/DL (ref 13–17.7)
MCH RBC QN AUTO: 30.5 PG (ref 26.6–33)
MCHC RBC AUTO-ENTMCNC: 34.8 G/DL (ref 31.5–35.7)
MCV RBC AUTO: 87.8 FL (ref 79–97)
PLATELET # BLD AUTO: 336 10*3/MM3 (ref 140–450)
PMV BLD AUTO: 9.3 FL (ref 6–12)
RBC # BLD AUTO: 4.42 10*6/MM3 (ref 4.14–5.8)
WBC NRBC COR # BLD: 15.94 10*3/MM3 (ref 3.4–10.8)

## 2023-03-06 PROCEDURE — 80053 COMPREHEN METABOLIC PANEL: CPT | Performed by: PHYSICIAN ASSISTANT

## 2023-03-06 PROCEDURE — 80061 LIPID PANEL: CPT | Performed by: PHYSICIAN ASSISTANT

## 2023-03-06 PROCEDURE — 85027 COMPLETE CBC AUTOMATED: CPT | Performed by: PHYSICIAN ASSISTANT

## 2023-03-06 PROCEDURE — 82607 VITAMIN B-12: CPT | Performed by: PHYSICIAN ASSISTANT

## 2023-03-06 PROCEDURE — 83880 ASSAY OF NATRIURETIC PEPTIDE: CPT | Performed by: PHYSICIAN ASSISTANT

## 2023-03-06 PROCEDURE — 36415 COLL VENOUS BLD VENIPUNCTURE: CPT | Performed by: PHYSICIAN ASSISTANT

## 2023-03-06 PROCEDURE — 99214 OFFICE O/P EST MOD 30 MIN: CPT | Performed by: PHYSICIAN ASSISTANT

## 2023-03-06 PROCEDURE — 86803 HEPATITIS C AB TEST: CPT | Performed by: PHYSICIAN ASSISTANT

## 2023-03-06 PROCEDURE — 85007 BL SMEAR W/DIFF WBC COUNT: CPT | Performed by: PHYSICIAN ASSISTANT

## 2023-03-06 PROCEDURE — 84443 ASSAY THYROID STIM HORMONE: CPT | Performed by: PHYSICIAN ASSISTANT

## 2023-03-06 PROCEDURE — 83036 HEMOGLOBIN GLYCOSYLATED A1C: CPT | Performed by: PHYSICIAN ASSISTANT

## 2023-03-06 RX ORDER — AMOXICILLIN AND CLAVULANATE POTASSIUM 875; 125 MG/1; MG/1
1 TABLET, FILM COATED ORAL 2 TIMES DAILY
Qty: 20 TABLET | Refills: 0 | Status: SHIPPED | OUTPATIENT
Start: 2023-03-06 | End: 2023-03-20

## 2023-03-07 ENCOUNTER — TELEPHONE (OUTPATIENT)
Dept: FAMILY MEDICINE CLINIC | Facility: CLINIC | Age: 83
End: 2023-03-07

## 2023-03-07 LAB
ALBUMIN SERPL-MCNC: 3.7 G/DL (ref 3.5–5.2)
ALBUMIN/GLOB SERPL: 1 G/DL
ALP SERPL-CCNC: 36 U/L (ref 39–117)
ALT SERPL W P-5'-P-CCNC: 33 U/L (ref 1–41)
ANION GAP SERPL CALCULATED.3IONS-SCNC: 13 MMOL/L (ref 5–15)
AST SERPL-CCNC: 41 U/L (ref 1–40)
BILIRUB SERPL-MCNC: 0.7 MG/DL (ref 0–1.2)
BUN SERPL-MCNC: 27 MG/DL (ref 8–23)
BUN/CREAT SERPL: 14.9 (ref 7–25)
CALCIUM SPEC-SCNC: 9.5 MG/DL (ref 8.6–10.5)
CHLORIDE SERPL-SCNC: 102 MMOL/L (ref 98–107)
CHOLEST SERPL-MCNC: 119 MG/DL (ref 0–200)
CO2 SERPL-SCNC: 22 MMOL/L (ref 22–29)
CREAT SERPL-MCNC: 1.81 MG/DL (ref 0.76–1.27)
EGFRCR SERPLBLD CKD-EPI 2021: 37.1 ML/MIN/1.73
GLOBULIN UR ELPH-MCNC: 3.7 GM/DL
GLUCOSE SERPL-MCNC: 143 MG/DL (ref 65–99)
HCV AB SER DONR QL: NORMAL
HDLC SERPL-MCNC: 33 MG/DL (ref 40–60)
LDLC SERPL CALC-MCNC: 71 MG/DL (ref 0–100)
LDLC/HDLC SERPL: 2.17 {RATIO}
LYMPHOCYTES # BLD MANUAL: 1.13 10*3/MM3 (ref 0.7–3.1)
LYMPHOCYTES NFR BLD MANUAL: 6.1 % (ref 5–12)
MONOCYTES # BLD: 0.97 10*3/MM3 (ref 0.1–0.9)
NEUTROPHILS # BLD AUTO: 13.85 10*3/MM3 (ref 1.7–7)
NEUTROPHILS NFR BLD MANUAL: 86.9 % (ref 42.7–76)
NT-PROBNP SERPL-MCNC: 2552 PG/ML (ref 0–1800)
PLAT MORPH BLD: NORMAL
POTASSIUM SERPL-SCNC: 3.9 MMOL/L (ref 3.5–5.2)
PROT SERPL-MCNC: 7.4 G/DL (ref 6–8.5)
RBC MORPH BLD: NORMAL
SODIUM SERPL-SCNC: 137 MMOL/L (ref 136–145)
TRIGL SERPL-MCNC: 72 MG/DL (ref 0–150)
TSH SERPL DL<=0.05 MIU/L-ACNC: 1.23 UIU/ML (ref 0.27–4.2)
VARIANT LYMPHS NFR BLD MANUAL: 7.1 % (ref 19.6–45.3)
VIT B12 BLD-MCNC: 421 PG/ML (ref 211–946)
VLDLC SERPL-MCNC: 15 MG/DL (ref 5–40)
WBC MORPH BLD: NORMAL

## 2023-03-07 NOTE — TELEPHONE ENCOUNTER
"    Caller: Lucio Castañeda \"Pito\"    Relationship: Self    Best call back number: [phone]    What test was performed: BLOOD WORK    When was the test performed: 03/06/23    Where was the test performed: OFFICE    Additional notes: PATIENT STATED HE WAS CALLING TO CHECK TO SEE IF RESULTS ARE AVAILABLE TO GO OVER WITH CLINICAL     PLEASE ADVISE        "

## 2023-03-08 ENCOUNTER — TELEPHONE (OUTPATIENT)
Dept: FAMILY MEDICINE CLINIC | Facility: CLINIC | Age: 83
End: 2023-03-08

## 2023-03-20 ENCOUNTER — OFFICE VISIT (OUTPATIENT)
Dept: FAMILY MEDICINE CLINIC | Facility: CLINIC | Age: 83
End: 2023-03-20
Payer: MEDICARE

## 2023-03-20 VITALS
OXYGEN SATURATION: 96 % | DIASTOLIC BLOOD PRESSURE: 64 MMHG | TEMPERATURE: 98.7 F | HEART RATE: 78 BPM | SYSTOLIC BLOOD PRESSURE: 130 MMHG | BODY MASS INDEX: 19.14 KG/M2 | WEIGHT: 133.4 LBS

## 2023-03-20 DIAGNOSIS — Z87.891 AGGRESSIVE FORMER SMOKER: ICD-10-CM

## 2023-03-20 DIAGNOSIS — R91.1 PULMONARY NODULE, LEFT: ICD-10-CM

## 2023-03-20 DIAGNOSIS — J44.0 CHRONIC OBSTRUCTIVE PULMONARY DISEASE WITH ACUTE LOWER RESPIRATORY INFECTION: Primary | ICD-10-CM

## 2023-03-20 NOTE — PROGRESS NOTES
Follow Up Office Visit      Date: 2023   Patient Name: Lucio Castañeda  : 1940   MRN: [id number]     Chief Complaint:    Chief Complaint   Patient presents with   • Pneumonia     Follow up  treatment        History of Present Illness: Lucio Castañeda is a 82 y.o. male who is here today for follow up of recent COPD exacerbation.  He noted that he began feeling better soon after starting antibiotics.  He has had resolution of his cough and his energy level is much improved.  He denies orthopnea or chest pain and has had no chills or diaphoresis.  He is concerned because he is having a hard time maintaining his weight and asks what he can consume that is best for him.      Subjective      Review of Systems:   Review of Systems   Constitutional: Negative.    HENT: Negative.    Respiratory: Positive for shortness of breath (back to baseline).    Cardiovascular: Negative.    Gastrointestinal: Negative.        I have reviewed the patients family history, social history, past medical history, past surgical history and have updated it as appropriate.     Medications:     Current Outpatient Medications:   •  amLODIPine (NORVASC) 10 MG tablet, Take 1 tablet by mouth Daily., Disp: , Rfl:   •  amLODIPine (NORVASC) 5 MG tablet, TAKE 1 TABLET BY MOUTH DAILY., Disp: 90 tablet, Rfl: 12  •  aspirin 81 MG tablet, Take 1 tablet by mouth Daily., Disp: , Rfl:   •  carvedilol (COREG) 12.5 MG tablet, Take 1 tablet by mouth 2 (Two) Times a Day., Disp: , Rfl: 12  •  clopidogrel (PLAVIX) 75 MG tablet, TAKE 1 TABLET BY MOUTH DAILY, Disp: 90 tablet, Rfl: 3  •  omeprazole (priLOSEC) 40 MG capsule, TAKE ONE CAPSULE BY MOUTH DAILY, Disp: 90 capsule, Rfl: 3  •  pantoprazole (PROTONIX) 40 MG EC tablet, Take 1 tablet by mouth Daily., Disp: , Rfl:   •  simvastatin (ZOCOR) 40 MG tablet, Daily., Disp: , Rfl:   •  Trelegy Ellipta 200-62.5-25 MCG/ACT aerosol powder , INHALE 1 PUFF ONCE DAILY **RINSE MOUTH AFTER EACH USE**, Disp: , Rfl:      Allergies:   Allergies   Allergen Reactions   • Morphine Hallucinations       Objective     Physical Exam: Please see above  Vital Signs:   Vitals:    03/20/23 0926 03/20/23 0929   BP: 130/64 130/64   BP Location: Left arm    Patient Position: Sitting    Cuff Size: Adult    Pulse: 78    Temp: 98.7 °F (37.1 °C)    TempSrc: Temporal    SpO2: 96%    Weight: 60.5 kg (133 lb 6.4 oz)      Body mass index is 19.14 kg/m².  BMI is within normal parameters. No other follow-up for BMI required.       Physical Exam  Constitutional:       General: He is not in acute distress.     Appearance: Normal appearance. He is not ill-appearing.   Cardiovascular:      Rate and Rhythm: Normal rate and regular rhythm.      Heart sounds: No murmur heard.    No friction rub. No gallop.   Pulmonary:      Effort: Pulmonary effort is normal.      Breath sounds: Rales (slight crackles still present.) present.   Musculoskeletal:      Right lower leg: No edema.      Left lower leg: No edema.   Neurological:      Mental Status: He is alert.         Procedures    Assessment / Plan      Assessment/Plan:   1. Chronic obstructive pulmonary disease with acute lower respiratory infection (HCC)  Patient states that his respiratory function is much improved no longer feels short of breath at rest is able to physically exert himself.  He still has a slightly productive cough but it is nearly at baseline.  He states that he feels he is back to his normal self and continues using his Trelegy daily.  When he has had more recovery time from his recent exacerbation he should undergo normal PFTs.    2. Pulmonary nodule, left  6 mm nodule was noted in his lung on chest x-ray that was ordered previously.  Due to his former smoking history he is eligible for screening and now work-up of the pulmonary nodule, we will order a low-dose CT scan of the chest.  - CT Chest Without Contrast; Future    3. Aggressive former smoker  Patient has over 40-pack-year smoking  history.  He has known emphysema and is treated chronically.  It is too early to do formal pulmonary function studies due to his recent pneumonia but we will repeat those on his next visit.      Follow Up:   No follow-ups on file.      At [facility], we believe that sharing information builds trust and better relationships. You are receiving this note because you recently visited [facility]. It is possible you will see health information before a provider has talked with you about it. This kind of information can be easy to misunderstand. To help you fully understand what it means for your health, we urge you to discuss this note with your provider.    Yari Morel PA-C  [facility]

## 2023-04-04 ENCOUNTER — OFFICE VISIT (OUTPATIENT)
Dept: FAMILY MEDICINE CLINIC | Facility: CLINIC | Age: 83
End: 2023-04-04
Payer: MEDICARE

## 2023-04-04 VITALS
WEIGHT: 130 LBS | HEART RATE: 68 BPM | HEIGHT: 70 IN | SYSTOLIC BLOOD PRESSURE: 126 MMHG | TEMPERATURE: 97 F | DIASTOLIC BLOOD PRESSURE: 70 MMHG | BODY MASS INDEX: 18.61 KG/M2 | RESPIRATION RATE: 17 BRPM | OXYGEN SATURATION: 96 %

## 2023-04-04 DIAGNOSIS — J44.1 COPD WITH EXACERBATION: ICD-10-CM

## 2023-04-04 DIAGNOSIS — H61.23 BILATERAL IMPACTED CERUMEN: ICD-10-CM

## 2023-04-04 DIAGNOSIS — I50.9 CONGESTIVE HEART FAILURE OF UNKNOWN ETIOLOGY: ICD-10-CM

## 2023-04-04 DIAGNOSIS — R53.1 WEAKNESS: Primary | ICD-10-CM

## 2023-04-04 LAB
BILIRUB BLD-MCNC: NEGATIVE MG/DL
CLARITY, POC: CLEAR
COLOR UR: YELLOW
GLUCOSE UR STRIP-MCNC: NEGATIVE MG/DL
KETONES UR QL: NEGATIVE
LEUKOCYTE EST, POC: NEGATIVE
NITRITE UR-MCNC: NEGATIVE MG/ML
PH UR: 6 [PH] (ref 5–8)
PROT UR STRIP-MCNC: NEGATIVE MG/DL
RBC # UR STRIP: NEGATIVE /UL
SP GR UR: 1.02 (ref 1–1.03)
UROBILINOGEN UR QL: NORMAL

## 2023-04-04 PROCEDURE — 80053 COMPREHEN METABOLIC PANEL: CPT | Performed by: PHYSICIAN ASSISTANT

## 2023-04-04 PROCEDURE — 85007 BL SMEAR W/DIFF WBC COUNT: CPT | Performed by: PHYSICIAN ASSISTANT

## 2023-04-04 PROCEDURE — 83880 ASSAY OF NATRIURETIC PEPTIDE: CPT | Performed by: PHYSICIAN ASSISTANT

## 2023-04-04 PROCEDURE — 81002 URINALYSIS NONAUTO W/O SCOPE: CPT | Performed by: PHYSICIAN ASSISTANT

## 2023-04-04 PROCEDURE — 3078F DIAST BP <80 MM HG: CPT | Performed by: PHYSICIAN ASSISTANT

## 2023-04-04 PROCEDURE — 99214 OFFICE O/P EST MOD 30 MIN: CPT | Performed by: PHYSICIAN ASSISTANT

## 2023-04-04 PROCEDURE — 36415 COLL VENOUS BLD VENIPUNCTURE: CPT | Performed by: PHYSICIAN ASSISTANT

## 2023-04-04 PROCEDURE — 3074F SYST BP LT 130 MM HG: CPT | Performed by: PHYSICIAN ASSISTANT

## 2023-04-04 PROCEDURE — 85027 COMPLETE CBC AUTOMATED: CPT | Performed by: PHYSICIAN ASSISTANT

## 2023-04-04 RX ORDER — NEOMYCIN SULFATE, POLYMYXIN B SULFATE, HYDROCORTISONE 3.5; 10000; 1 MG/ML; [USP'U]/ML; MG/ML
3 SOLUTION/ DROPS AURICULAR (OTIC) 4 TIMES DAILY
Status: SHIPPED | OUTPATIENT
Start: 2023-04-04

## 2023-04-04 RX ORDER — PREDNISONE 10 MG/1
60 TABLET ORAL DAILY
Qty: 18 TABLET | Refills: 0 | Status: SHIPPED | OUTPATIENT
Start: 2023-04-04 | End: 2023-04-07

## 2023-04-04 RX ORDER — DOXYCYCLINE HYCLATE 100 MG/1
100 CAPSULE ORAL 2 TIMES DAILY
Qty: 20 CAPSULE | Refills: 0 | Status: SHIPPED | OUTPATIENT
Start: 2023-04-04 | End: 2023-04-11

## 2023-04-04 NOTE — PROGRESS NOTES
Follow Up Office Visit      Date: 2023   Patient Name: Lucio Castañeda  : 1940   MRN: [id number]     Chief Complaint:    Chief Complaint   Patient presents with   • Generalized Body Aches   • Dizziness     Onset for awhile     pt finished antibiotics the last time he saw Yari,   would like some Prednisone                         has ct scan on lungs is schedule on        History of Present Illness: Lucio Castañeda is a 82 y.o. male who is here today for follow-up of recent pneumonia.  He reports that he began to perk up after taking the antibiotics but states that he is still achy and has continued to have fatigue.  He is no longer having a productive cough but feels somewhat short of breath.  He would like to have steroids because we did not give him with his COPD exacerbation/pneumonia due to recent surgical incision on his scalp for a skin tumor.  He feels that that may be good steroids will help tune up his breathing as well as help him with the achiness of his joints.  At the time of his visit laboratory data was drawn and he was noted to have a pulmonary nodule on his x-ray he has a scheduled CT scan on  at that time he was noted to have an elevated BNP but he has not had lower extremity edema.    Subjective      Review of Systems:   Review of Systems   Constitutional: Positive for activity change (feels fatigued and achy) and fatigue. Negative for appetite change, chills and fever.   HENT: Positive for postnasal drip.    Eyes: Negative.    Respiratory: Positive for cough and shortness of breath (just gets short winded easily). Negative for choking, chest tightness and wheezing.    Cardiovascular: Negative for chest pain, palpitations and leg swelling.       I have reviewed the patients family history, social history, past medical history, past surgical history and have updated it as appropriate.     Medications:     Current Outpatient Medications:   •  amLODIPine (NORVASC)  "10 MG tablet, Take 1 tablet by mouth Daily., Disp: , Rfl:   •  amLODIPine (NORVASC) 5 MG tablet, TAKE 1 TABLET BY MOUTH DAILY., Disp: 90 tablet, Rfl: 12  •  aspirin 81 MG tablet, Take 1 tablet by mouth Daily., Disp: , Rfl:   •  carvedilol (COREG) 12.5 MG tablet, Take 1 tablet by mouth 2 (Two) Times a Day., Disp: , Rfl: 12  •  clopidogrel (PLAVIX) 75 MG tablet, TAKE 1 TABLET BY MOUTH DAILY, Disp: 90 tablet, Rfl: 3  •  omeprazole (priLOSEC) 40 MG capsule, TAKE ONE CAPSULE BY MOUTH DAILY, Disp: 90 capsule, Rfl: 3  •  pantoprazole (PROTONIX) 40 MG EC tablet, Take 1 tablet by mouth Daily., Disp: , Rfl:   •  simvastatin (ZOCOR) 40 MG tablet, Daily., Disp: , Rfl:   •  Trelegy Ellipta 200-62.5-25 MCG/ACT aerosol powder , INHALE 1 PUFF ONCE DAILY **RINSE MOUTH AFTER EACH USE**, Disp: , Rfl:   •  doxycycline (VIBRAMYCIN) 100 MG capsule, Take 1 capsule by mouth 2 (Two) Times a Day for 10 days., Disp: 20 capsule, Rfl: 0  •  predniSONE (DELTASONE) 10 MG tablet, Take 6 tablets by mouth Daily for 3 days., Disp: 18 tablet, Rfl: 0    Current Facility-Administered Medications:   •  neomycin-polymyxin-hydrocortisone (CORTISPORIN) 1 % otic solution solution 3 drop, 3 drop, Both Ears, 4x Daily, Yari Morel PA-C    Allergies:   Allergies   Allergen Reactions   • Morphine Hallucinations       Objective     Physical Exam: Please see above  Vital Signs:   Vitals:    04/04/23 0904   BP: 126/70   BP Location: Right arm   Patient Position: Sitting   Cuff Size: Adult   Pulse: 68   Resp: 17   Temp: 97 °F (36.1 °C)   SpO2: 96%   Weight: 59 kg (130 lb)   Height: 177.8 cm (70\")     Body mass index is 18.65 kg/m².  BMI is within normal parameters. No other follow-up for BMI required.       Physical Exam  Vitals and nursing note reviewed.   Constitutional:       General: He is not in acute distress.     Appearance: He is not ill-appearing or toxic-appearing.   HENT:      Head: Normocephalic and atraumatic.      Right Ear: There is impacted " cerumen.      Left Ear: There is impacted cerumen (tenderness of ear canal with difficulty removing wax, appears that skin is neduding with wax.).      Nose: No congestion or rhinorrhea.      Mouth/Throat:      Mouth: Mucous membranes are moist.      Pharynx: No oropharyngeal exudate or posterior oropharyngeal erythema.   Eyes:      Extraocular Movements: Extraocular movements intact.      Pupils: Pupils are equal, round, and reactive to light.   Cardiovascular:      Rate and Rhythm: Normal rate and regular rhythm.      Pulses: Normal pulses.      Heart sounds: No murmur heard.    No friction rub. No gallop.   Pulmonary:      Effort: Pulmonary effort is normal. No respiratory distress.      Breath sounds: Normal breath sounds. No wheezing or rhonchi.   Chest:      Chest wall: No tenderness.   Neurological:      Mental Status: He is alert.         Procedures    Assessment / Plan      Assessment/Plan:   1. Weakness  Mr. Castañeda may be having difficulty recovering from his pneumonia that at the time of his last visit his white blood cell count was elevated to 15 and his BNP was 2250.  We will repeat those levels at this time to see if there is been improvement  - CBC With Manual Differential; Future  - Comprehensive Metabolic Panel; Future  - proBNP; Future  - POC Urinalysis Dipstick  - CBC With Manual Differential  - Comprehensive Metabolic Panel  - proBNP    2. Bilateral impacted cerumen  Both ear canals were very tender and inflamed after lavage we will treat with some antibiotic steroid ointment.  - neomycin-polymyxin-hydrocortisone (CORTISPORIN) 1 % otic solution solution 3 drop    3. Congestive heart failure of unknown etiology  Abnormal laboratory data last visit may be associated with pneumonia. He has not had lower extremity edema.  We will reevaluate that exam today.  - proBNP; Future  - proBNP    4. COPD with exacerbation  As per the patient we will again use a quick burst of steroids to help him with his  dyspnea.  He has a CT scan follow-up of his x-ray abnormalities in 1 week.  We will evaluate follow-up visit again in 2 weeks.  - predniSONE (DELTASONE) 10 MG tablet; Take 6 tablets by mouth Daily for 3 days.  Dispense: 18 tablet; Refill: 0  - doxycycline (VIBRAMYCIN) 100 MG capsule; Take 1 capsule by mouth 2 (Two) Times a Day for 10 days.  Dispense: 20 capsule; Refill: 0      Follow Up:   Return in about 2 weeks (around 4/18/2023) for Annual physical.      At [facility], we believe that sharing information builds trust and better relationships. You are receiving this note because you recently visited [facility]. It is possible you will see health information before a provider has talked with you about it. This kind of information can be easy to misunderstand. To help you fully understand what it means for your health, we urge you to discuss this note with your provider.    Yari Morel PA-C  [facility]

## 2023-04-05 DIAGNOSIS — R06.02 SHORTNESS OF BREATH: Primary | ICD-10-CM

## 2023-04-05 LAB
ALBUMIN SERPL-MCNC: 3.9 G/DL (ref 3.5–5.2)
ALBUMIN/GLOB SERPL: 1.1 G/DL
ALP SERPL-CCNC: 32 U/L (ref 39–117)
ALT SERPL W P-5'-P-CCNC: 7 U/L (ref 1–41)
ANION GAP SERPL CALCULATED.3IONS-SCNC: 11.2 MMOL/L (ref 5–15)
ANISOCYTOSIS BLD QL: ABNORMAL
AST SERPL-CCNC: 13 U/L (ref 1–40)
BASOPHILS # BLD MANUAL: 0.15 10*3/MM3 (ref 0–0.2)
BASOPHILS NFR BLD MANUAL: 2.2 % (ref 0–1.5)
BILIRUB SERPL-MCNC: 0.4 MG/DL (ref 0–1.2)
BUN SERPL-MCNC: 17 MG/DL (ref 8–23)
BUN/CREAT SERPL: 13.8 (ref 7–25)
CALCIUM SPEC-SCNC: 9.7 MG/DL (ref 8.6–10.5)
CHLORIDE SERPL-SCNC: 103 MMOL/L (ref 98–107)
CO2 SERPL-SCNC: 23.8 MMOL/L (ref 22–29)
CREAT SERPL-MCNC: 1.23 MG/DL (ref 0.76–1.27)
DEPRECATED RDW RBC AUTO: 42.4 FL (ref 37–54)
EGFRCR SERPLBLD CKD-EPI 2021: 59 ML/MIN/1.73
EOSINOPHIL # BLD MANUAL: 0.45 10*3/MM3 (ref 0–0.4)
EOSINOPHIL NFR BLD MANUAL: 6.5 % (ref 0.3–6.2)
ERYTHROCYTE [DISTWIDTH] IN BLOOD BY AUTOMATED COUNT: 13.2 % (ref 12.3–15.4)
GLOBULIN UR ELPH-MCNC: 3.5 GM/DL
GLUCOSE SERPL-MCNC: 111 MG/DL (ref 65–99)
HCT VFR BLD AUTO: 40.9 % (ref 37.5–51)
HGB BLD-MCNC: 13.5 G/DL (ref 13–17.7)
LYMPHOCYTES # BLD MANUAL: 1.2 10*3/MM3 (ref 0.7–3.1)
LYMPHOCYTES NFR BLD MANUAL: 6.5 % (ref 5–12)
MCH RBC QN AUTO: 28.9 PG (ref 26.6–33)
MCHC RBC AUTO-ENTMCNC: 33 G/DL (ref 31.5–35.7)
MCV RBC AUTO: 87.6 FL (ref 79–97)
MICROCYTES BLD QL: ABNORMAL
MONOCYTES # BLD: 0.45 10*3/MM3 (ref 0.1–0.9)
NEUTROPHILS # BLD AUTO: 4.65 10*3/MM3 (ref 1.7–7)
NEUTROPHILS NFR BLD MANUAL: 67.4 % (ref 42.7–76)
NT-PROBNP SERPL-MCNC: 7173 PG/ML (ref 0–1800)
PLAT MORPH BLD: NORMAL
PLATELET # BLD AUTO: 280 10*3/MM3 (ref 140–450)
PMV BLD AUTO: 9.7 FL (ref 6–12)
POIKILOCYTOSIS BLD QL SMEAR: ABNORMAL
POTASSIUM SERPL-SCNC: 4.1 MMOL/L (ref 3.5–5.2)
PROT SERPL-MCNC: 7.4 G/DL (ref 6–8.5)
RBC # BLD AUTO: 4.67 10*6/MM3 (ref 4.14–5.8)
SODIUM SERPL-SCNC: 138 MMOL/L (ref 136–145)
VARIANT LYMPHS NFR BLD MANUAL: 17.4 % (ref 19.6–45.3)
WBC MORPH BLD: NORMAL
WBC NRBC COR # BLD: 6.9 10*3/MM3 (ref 3.4–10.8)

## 2023-04-05 RX ORDER — TORSEMIDE 20 MG/1
20 TABLET ORAL DAILY
Qty: 90 TABLET | Refills: 3 | Status: SHIPPED | OUTPATIENT
Start: 2023-04-05 | End: 2023-04-11

## 2023-04-11 ENCOUNTER — OFFICE VISIT (OUTPATIENT)
Dept: FAMILY MEDICINE CLINIC | Facility: CLINIC | Age: 83
End: 2023-04-11
Payer: MEDICARE

## 2023-04-11 VITALS
BODY MASS INDEX: 18.18 KG/M2 | HEART RATE: 52 BPM | RESPIRATION RATE: 17 BRPM | TEMPERATURE: 97 F | WEIGHT: 127 LBS | HEIGHT: 70 IN | DIASTOLIC BLOOD PRESSURE: 60 MMHG | SYSTOLIC BLOOD PRESSURE: 118 MMHG | OXYGEN SATURATION: 95 %

## 2023-04-11 DIAGNOSIS — J43.1 PANLOBULAR EMPHYSEMA: ICD-10-CM

## 2023-04-11 DIAGNOSIS — R91.1 PULMONARY NODULE 1 CM OR GREATER IN DIAMETER: Primary | ICD-10-CM

## 2023-04-11 DIAGNOSIS — J18.9 PNEUMONIA OF RIGHT LOWER LOBE DUE TO INFECTIOUS ORGANISM: ICD-10-CM

## 2023-04-11 NOTE — PROGRESS NOTES
Follow Up Office Visit      Date: 2023   Patient Name: Lucio Castañeda  : 1940   MRN: [id number]     Chief Complaint:    Chief Complaint   Patient presents with   • Mass     Fu with Ct Scan results,  pt states he feels much better since he had Pneumonia       History of Present Illness: Lucio Castañeda is a 82 y.o. male who is here today for follow-up.  Patient states that he is feeling much better since last being seen for his pneumonia.  He has continued to take his antibiotics and his cough is greatly improved.  Patient did have a CT scan of his chest that did confirm underlying emphysema.  There was also a 3.9 cm nodule noted in his right lower lobe that was of concern.  Patient does have a history of having tobacco usage but discontinued more than 20 years ago.  Patient also has farmed for several decades.  He denies any fever or chills at present time.  He does have mild occasional cough without wheeze.  The cough is nonproductive.  He denies any hemoptysis as well.  He has slowly increase his activity and appetite.  Sleep is not affected.  He denies any other cardiovascular, respiratory, gastrointestinal, urologic or neurologic complaints.    Subjective      Review of Systems:   Review of Systems   Constitutional: Negative for activity change, appetite change and fatigue.   Respiratory: Positive for cough. Negative for shortness of breath and wheezing.    Cardiovascular: Negative for chest pain, palpitations and leg swelling.   Gastrointestinal: Negative for abdominal pain, blood in stool, constipation, diarrhea, nausea, vomiting, GERD and indigestion.   Genitourinary: Negative for dysuria, flank pain, frequency and urgency.   Neurological: Negative for dizziness, weakness and memory problem.       I have reviewed the patients family history, social history, past medical history, past surgical history and have updated it as appropriate.     Medications:     Current Outpatient Medications:   •   amLODIPine (NORVASC) 10 MG tablet, Take 1 tablet by mouth Daily., Disp: , Rfl:   •  amLODIPine (NORVASC) 5 MG tablet, TAKE 1 TABLET BY MOUTH DAILY., Disp: 90 tablet, Rfl: 12  •  aspirin 81 MG tablet, Take 1 tablet by mouth Daily., Disp: , Rfl:   •  carvedilol (COREG) 12.5 MG tablet, Take 1 tablet by mouth 2 (Two) Times a Day., Disp: , Rfl: 12  •  clopidogrel (PLAVIX) 75 MG tablet, TAKE 1 TABLET BY MOUTH DAILY, Disp: 90 tablet, Rfl: 3  •  doxycycline (VIBRAMYCIN) 100 MG capsule, Take 1 capsule by mouth 2 (Two) Times a Day for 10 days., Disp: 20 capsule, Rfl: 0  •  omeprazole (priLOSEC) 40 MG capsule, TAKE ONE CAPSULE BY MOUTH DAILY, Disp: 90 capsule, Rfl: 3  •  pantoprazole (PROTONIX) 40 MG EC tablet, Take 1 tablet by mouth Daily., Disp: , Rfl:   •  simvastatin (ZOCOR) 40 MG tablet, Daily., Disp: , Rfl:   •  torsemide (DEMADEX) 20 MG tablet, Take 1 tablet by mouth Daily., Disp: 90 tablet, Rfl: 3  •  Trelegy Ellipta 200-62.5-25 MCG/ACT aerosol powder , INHALE 1 PUFF ONCE DAILY **RINSE MOUTH AFTER EACH USE**, Disp: , Rfl:     Current Facility-Administered Medications:   •  neomycin-polymyxin-hydrocortisone (CORTISPORIN) 1 % otic solution solution 3 drop, 3 drop, Both Ears, 4x Daily, Yari Morel PA-C    Allergies:   Allergies   Allergen Reactions   • Morphine Hallucinations       Immunizations:   Immunization History   Administered Date(s) Administered   • COVID-19 (MODERNA) 1st, 2nd, 3rd Dose Only 03/16/2021, 04/14/2021, 11/16/2021   • FLUAD TRI 65YR+ 11/15/2019   • Flu Vaccine Quad PF >36MO 11/17/2020   • FluLaval/Fluzone >6mos 11/17/2020   • Fluad Quad 65+ 11/17/2020   • Fluzone Quad >6mos (Multi-dose) 08/24/2016   • Influenza, Unspecified 11/16/2018   • Pneumococcal Conjugate 13-Valent (PCV13) 08/24/2016   • Pneumococcal Polysaccharide (PPSV23) 12/11/2006        Objective     Physical Exam: Please see above  Vital Signs:   Vitals:    04/11/23 0838   BP: 118/60   BP Location: Left arm   Patient Position:  "Sitting   Cuff Size: Adult   Pulse: 52   Resp: 17   Temp: 97 °F (36.1 °C)   SpO2: 95%   Weight: 57.6 kg (127 lb)   Height: 177.8 cm (70\")     Body mass index is 18.22 kg/m².  BMI is below normal parameters (malnutrition). Recommendations: treating the underlying disease process       Physical Exam  Vitals and nursing note reviewed.   Constitutional:       Appearance: Normal appearance.   HENT:      Head: Normocephalic and atraumatic.      Nose: Nose normal.      Mouth/Throat:      Pharynx: Oropharynx is clear.   Eyes:      Extraocular Movements: Extraocular movements intact.      Pupils: Pupils are equal, round, and reactive to light.   Neck:      Thyroid: No thyroid mass or thyromegaly.      Trachea: Trachea normal.   Cardiovascular:      Rate and Rhythm: Normal rate and regular rhythm.      Pulses: Normal pulses. No decreased pulses.      Heart sounds: Normal heart sounds.   Pulmonary:      Effort: Pulmonary effort is normal.      Breath sounds: Normal breath sounds.   Abdominal:      General: Abdomen is flat. Bowel sounds are normal.      Palpations: Abdomen is soft.      Tenderness: There is no abdominal tenderness.   Musculoskeletal:      Cervical back: Neck supple.      Right lower leg: No edema.      Left lower leg: No edema.   Lymphadenopathy:      Cervical: No cervical adenopathy.   Skin:     General: Skin is warm and dry.   Neurological:      General: No focal deficit present.      Mental Status: He is alert and oriented to person, place, and time.      Sensory: Sensation is intact.      Motor: Motor function is intact.      Coordination: Coordination is intact.   Psychiatric:         Attention and Perception: Attention normal.         Mood and Affect: Mood normal.         Speech: Speech normal.         Behavior: Behavior normal.         Pulmonary Function Test  Performed by: Rocky Morel MD  Authorized by: Rocky Morel MD       Interpretation   Overall comments: .  He is " suggestive of restrictive lung disease.  Please see report for full details.        Results:   Labs:   Hemoglobin A1C   Date Value Ref Range Status   03/06/2023 6.00 (H) 4.80 - 5.60 % Final     TSH   Date Value Ref Range Status   03/06/2023 1.230 0.270 - 4.200 uIU/mL Final        POCT Results (if applicable):   Results for orders placed or performed in visit on 04/04/23   Comprehensive Metabolic Panel    Specimen: Arm, Right; Blood   Result Value Ref Range    Glucose 111 (H) 65 - 99 mg/dL    BUN 17 8 - 23 mg/dL    Creatinine 1.23 0.76 - 1.27 mg/dL    Sodium 138 136 - 145 mmol/L    Potassium 4.1 3.5 - 5.2 mmol/L    Chloride 103 98 - 107 mmol/L    CO2 23.8 22.0 - 29.0 mmol/L    Calcium 9.7 8.6 - 10.5 mg/dL    Total Protein 7.4 6.0 - 8.5 g/dL    Albumin 3.9 3.5 - 5.2 g/dL    ALT (SGPT) 7 1 - 41 U/L    AST (SGOT) 13 1 - 40 U/L    Alkaline Phosphatase 32 (L) 39 - 117 U/L    Total Bilirubin 0.4 0.0 - 1.2 mg/dL    Globulin 3.5 gm/dL    A/G Ratio 1.1 g/dL    BUN/Creatinine Ratio 13.8 7.0 - 25.0    Anion Gap 11.2 5.0 - 15.0 mmol/L    eGFR 59.0 (L) >60.0 mL/min/1.73   proBNP    Specimen: Arm, Right; Blood   Result Value Ref Range    proBNP 7,173.0 (H) 0.0 - 1,800.0 pg/mL   CBC Auto Differential    Specimen: Arm, Right; Blood   Result Value Ref Range    WBC 6.90 3.40 - 10.80 10*3/mm3    RBC 4.67 4.14 - 5.80 10*6/mm3    Hemoglobin 13.5 13.0 - 17.7 g/dL    Hematocrit 40.9 37.5 - 51.0 %    MCV 87.6 79.0 - 97.0 fL    MCH 28.9 26.6 - 33.0 pg    MCHC 33.0 31.5 - 35.7 g/dL    RDW 13.2 12.3 - 15.4 %    RDW-SD 42.4 37.0 - 54.0 fl    MPV 9.7 6.0 - 12.0 fL    Platelets 280 140 - 450 10*3/mm3   Manual Differential    Specimen: Arm, Right; Blood   Result Value Ref Range    Neutrophil % 67.4 42.7 - 76.0 %    Lymphocyte % 17.4 (L) 19.6 - 45.3 %    Monocyte % 6.5 5.0 - 12.0 %    Eosinophil % 6.5 (H) 0.3 - 6.2 %    Basophil % 2.2 (H) 0.0 - 1.5 %    Neutrophils Absolute 4.65 1.70 - 7.00 10*3/mm3    Lymphocytes Absolute 1.20 0.70 - 3.10 10*3/mm3     Monocytes Absolute 0.45 0.10 - 0.90 10*3/mm3    Eosinophils Absolute 0.45 (H) 0.00 - 0.40 10*3/mm3    Basophils Absolute 0.15 0.00 - 0.20 10*3/mm3    Anisocytosis Slight/1+ None Seen    Microcytes Slight/1+ None Seen    Poikilocytes Slight/1+ None Seen    WBC Morphology Normal Normal    Platelet Morphology Normal Normal   POC Urinalysis Dipstick    Specimen: Urine   Result Value Ref Range    Color Yellow Yellow, Straw, Dark Yellow, Claudia    Clarity, UA Clear Clear    Glucose, UA Negative Negative mg/dL    Bilirubin Negative Negative    Ketones, UA Negative Negative    Specific Gravity  1.020 1.005 - 1.030    Blood, UA Negative Negative    pH, Urine 6.0 5.0 - 8.0    Protein, POC Negative Negative mg/dL    Urobilinogen, UA 0.2 E.U./dL Normal, 0.2 E.U./dL    Leukocytes Negative Negative    Nitrite, UA Negative Negative       Imaging:   No valid procedures specified.     Measures:   Advanced Care Planning:   Patient does not have an advance directive, information provided.    Smoking Cessation:   Non-smoker.    Assessment / Plan      Assessment/Plan:   Diagnoses and all orders for this visit:    1. Pulmonary nodule 1 cm or greater in diameter (Primary)   Patient CT scan confirmed emphysema and unfortunately revealed a 3.9 cm pulmonary nodule.  It has been quite sometime since he has discontinued smoking but nonetheless we have discussed options and we will make arrangements for a PET/CT scan.  If it shows increase in metabolic activity we will refer for probable biopsy.   -PET/CT scan ordered    2. Panlobular emphysema   CT scan showed emphysema but the PFTs suggest some underlying restrictive lung disease.  Patient could have a little bit of pulmonary fibrosis in addition to his underlying emphysema.  He states that Trelegy is very beneficial.  We will continue him on his current regiment.  We may need to refer to a pulmonologist even if the PET scan is acceptable to assess he has possible underlying restrictive  lung disease.  -     Pulmonary Function Test    3. Pneumonia of right lower lobe due to infectious organism   Patient appears to have cleared his pneumonia.  He will finish out the antibiotics and contact us if he has other questions or concerns.  We may also discontinue his torsemide as it does not appear that he has underlying heart failure at present time.      Follow Up:   No follow-ups on file.      At [facility], we believe that sharing information builds trust and better relationships. You are receiving this note because you recently visited [facility]. It is possible you will see health information before a provider has talked with you about it. This kind of information can be easy to misunderstand. To help you fully understand what it means for your health, we urge you to discuss this note with your provider.    Rocky Morel MD  [facility]

## 2023-04-18 ENCOUNTER — OFFICE VISIT (OUTPATIENT)
Dept: FAMILY MEDICINE CLINIC | Facility: CLINIC | Age: 83
End: 2023-04-18
Payer: MEDICARE

## 2023-04-18 VITALS
SYSTOLIC BLOOD PRESSURE: 112 MMHG | WEIGHT: 133 LBS | BODY MASS INDEX: 19.08 KG/M2 | TEMPERATURE: 98.6 F | HEART RATE: 68 BPM | OXYGEN SATURATION: 95 % | DIASTOLIC BLOOD PRESSURE: 78 MMHG

## 2023-04-18 DIAGNOSIS — I25.9 IHD (ISCHEMIC HEART DISEASE): ICD-10-CM

## 2023-04-18 DIAGNOSIS — J43.1 PANLOBULAR EMPHYSEMA: ICD-10-CM

## 2023-04-18 DIAGNOSIS — I10 ESSENTIAL HYPERTENSION: ICD-10-CM

## 2023-04-18 DIAGNOSIS — E78.5 DYSLIPIDEMIA: ICD-10-CM

## 2023-04-18 DIAGNOSIS — R73.09 ELEVATED HEMOGLOBIN A1C: ICD-10-CM

## 2023-04-18 DIAGNOSIS — R91.1 PULMONARY NODULE 1 CM OR GREATER IN DIAMETER: ICD-10-CM

## 2023-04-18 DIAGNOSIS — Z00.00 WELL ADULT EXAM: Primary | ICD-10-CM

## 2023-04-18 DIAGNOSIS — Z23 NEED FOR ZOSTER VACCINATION: ICD-10-CM

## 2023-04-18 RX ORDER — AMLODIPINE BESYLATE 10 MG/1
TABLET ORAL
Qty: 90 TABLET | Refills: 3 | Status: SHIPPED | OUTPATIENT
Start: 2023-04-18

## 2023-04-18 RX ORDER — CLOSTRIDIUM TETANI TOXOID ANTIGEN (FORMALDEHYDE INACTIVATED), CORYNEBACTERIUM DIPHTHERIAE TOXOID ANTIGEN (FORMALDEHYDE INACTIVATED), BORDETELLA PERTUSSIS TOXOID ANTIGEN (GLUTARALDEHYDE INACTIVATED), BORDETELLA PERTUSSIS FILAMENTOUS HEMAGGLUTININ ANTIGEN (FORMALDEHYDE INACTIVATED), BORDETELLA PERTUSSIS PERTACTIN ANTIGEN, AND BORDETELLA PERTUSSIS FIMBRIAE 2/3 ANTIGEN 5; 2; 2.5; 5; 3; 5 [LF]/.5ML; [LF]/.5ML; UG/.5ML; UG/.5ML; UG/.5ML; UG/.5ML
0.5 INJECTION, SUSPENSION INTRAMUSCULAR ONCE
Qty: 0.5 ML | Refills: 0 | Status: SHIPPED | OUTPATIENT
Start: 2023-04-18 | End: 2023-04-18

## 2023-04-18 NOTE — PROGRESS NOTES
The ABCs of the Annual Wellness Visit  Subsequent Medicare Wellness Visit    Subjective    Lucio Castañeda is a 82 y.o. male who presents for a Subsequent Medicare Wellness Visit.  Patient has been doing well since last being seen with time problems noted.  He does continue to take his medication as prescribed without side effects.  His activity level, sleep habits as well as appetite have been unchanged.    The following portions of the patient's history were reviewed and   updated as appropriate: allergies, current medications, past family history, past medical history, past social history, past surgical history and problem list.    Compared to one year ago, the patient feels his physical   health is the same.    Compared to one year ago, the patient feels his mental   health is the same.    Recent Hospitalizations:  He was not admitted to the hospital during the last year.       Current Medical Providers:  Patient Care Team:  Rocky Morel MD as PCP - General (Family Medicine)  Rocky Morel MD as Referring Physician (Family Medicine)    Outpatient Medications Prior to Visit   Medication Sig Dispense Refill   • amLODIPine (NORVASC) 5 MG tablet TAKE 1 TABLET BY MOUTH DAILY. 90 tablet 12   • aspirin 81 MG tablet Take 1 tablet by mouth Daily.     • carvedilol (COREG) 12.5 MG tablet Take 1 tablet by mouth 2 (Two) Times a Day.  12   • clopidogrel (PLAVIX) 75 MG tablet TAKE 1 TABLET BY MOUTH DAILY 90 tablet 3   • omeprazole (priLOSEC) 40 MG capsule TAKE ONE CAPSULE BY MOUTH DAILY 90 capsule 3   • simvastatin (ZOCOR) 40 MG tablet Daily.     • Trelegy Ellipta 200-62.5-25 MCG/ACT aerosol powder  INHALE 1 PUFF ONCE DAILY **RINSE MOUTH AFTER EACH USE**     • amLODIPine (NORVASC) 10 MG tablet Take 1 tablet by mouth Daily.     • pantoprazole (PROTONIX) 40 MG EC tablet Take 1 tablet by mouth Daily.       Facility-Administered Medications Prior to Visit   Medication Dose Route Frequency Provider Last  "Rate Last Admin   • neomycin-polymyxin-hydrocortisone (CORTISPORIN) 1 % otic solution solution 3 drop  3 drop Both Ears 4x Daily Yari Morel PA-C           No opioid medication identified on active medication list. I have reviewed chart for other potential  high risk medication/s and harmful drug interactions in the elderly.          Aspirin is on active medication list. Aspirin use is not indicated based on review of current medical condition/s. Risk of harm outweighs potential benefits. Patient instructed to discontinue this medication.  .      Patient Active Problem List   Diagnosis   • Essential hypertension   • Gastroesophageal reflux disease without esophagitis   • IHD (ischemic heart disease)   • Diverticul disease small and large intestine, no perforati or abscess   • Dyslipidemia   • Carotid stenosis, symptomatic w/o infarct, left   • Amaurosis fugax of left eye   • COPD (chronic obstructive pulmonary disease)   • Hematoma, right femoral     Advance Care Planning   Advance Care Planning     Advance Directive is not on file.  ACP discussion was held with the patient during this visit. Patient has an advance directive (not in EMR), copy requested.     Objective    Vitals:    04/18/23 1314   BP: 112/78   BP Location: Left arm   Patient Position: Sitting   Cuff Size: Adult   Pulse: 68   Temp: 98.6 °F (37 °C)   TempSrc: Temporal   SpO2: 95%   Weight: 60.3 kg (133 lb)     Estimated body mass index is 19.08 kg/m² as calculated from the following:    Height as of 4/11/23: 177.8 cm (70\").    Weight as of this encounter: 60.3 kg (133 lb).    BMI is within normal parameters. No other follow-up for BMI required.      Does the patient have evidence of cognitive impairment? No    Lab Results   Component Value Date    TRIG 72 03/06/2023    HDL 33 (L) 03/06/2023    LDL 71 03/06/2023    VLDL 15 03/06/2023    HGBA1C 6.00 (H) 03/06/2023        HEALTH RISK ASSESSMENT    Smoking Status:  Social History     Tobacco Use "   Smoking Status Former   • Packs/day: 1.00   • Types: Cigarettes   • Start date:    • Quit date: 7/10/1997   • Years since quittin.7   • Passive exposure: Past   Smokeless Tobacco Current   • Types: Snuff, Chew     Alcohol Consumption:  Social History     Substance and Sexual Activity   Alcohol Use No     Fall Risk Screen:    ELVIRA Fall Risk Assessment was completed, and patient is at MODERATE risk for falls. Assessment completed on:2023    Depression Screenin/18/2023     1:00 PM   PHQ-2/PHQ-9 Depression Screening   Little Interest or Pleasure in Doing Things 0-->not at all   Feeling Down, Depressed or Hopeless 0-->not at all   PHQ-9: Brief Depression Severity Measure Score 0       Health Habits and Functional and Cognitive Screenin/18/2023     1:00 PM   Functional & Cognitive Status   Do you have difficulty preparing food and eating? No   Do you have difficulty bathing yourself, getting dressed or grooming yourself? No   Do you have difficulty using the toilet? No   Do you have difficulty moving around from place to place? No   Do you have trouble with steps or getting out of a bed or a chair? No   Current Diet Well Balanced Diet   Dental Exam Up to date   Eye Exam Up to date   Exercise (times per week) 7 times per week   Current Exercises Include Walking   Do you need help using the phone?  No   Are you deaf or do you have serious difficulty hearing?  No   Do you need help with transportation? No   Do you need help shopping? No   Do you need help preparing meals?  No   Do you need help with housework?  No   Do you need help with laundry? No   Do you need help taking your medications? No   Do you need help managing money? No   Do you ever drive or ride in a car without wearing a seat belt? No   Have you felt unusual stress, anger or loneliness in the last month? No   Who do you live with? Spouse   If you need help, do you have trouble finding someone available to you? No   Have you  been bothered in the last four weeks by sexual problems? No   Do you have difficulty concentrating, remembering or making decisions? No       Age-appropriate Screening Schedule:  Refer to the list below for future screening recommendations based on patient's age, sex and/or medical conditions. Orders for these recommended tests are listed in the plan section. The patient has been provided with a written plan.    Health Maintenance   Topic Date Due   • TDAP/TD VACCINES (1 - Tdap) Never done   • ZOSTER VACCINE (1 of 2) Never done   • COVID-19 Vaccine (4 - Booster for Moderna series) 04/13/2024 (Originally 1/11/2022)   • INFLUENZA VACCINE  08/01/2023   • LIPID PANEL  03/06/2024   • ANNUAL WELLNESS VISIT  04/18/2024   • Pneumococcal Vaccine 65+  Completed                  CMS Preventative Services Quick Reference  Risk Factors Identified During Encounter  None Identified  The above risks/problems have been discussed with the patient.  Pertinent information has been shared with the patient in the After Visit Summary.  An After Visit Summary and PPPS were made available to the patient.    Follow Up:   Next Medicare Wellness visit to be scheduled in 1 year.       Additional E&M Note during same encounter follows:  Patient has multiple medical problems which are significant and separately identifiable that require additional work above and beyond the Medicare Wellness Visit.      Chief Complaint  Medicare Wellness-subsequent    Subjective        HPI  Lucio Castañeda is also being seen today for for follow-up of his numerous medical conditions.  Patient has not had the PET/CT scan performed yet for assessment of the pleural based nodule noted on CT scan.  He does continue to take his medication as prescribed.  He is using the trilogy without complaints.  He denies any other chest symptomatology.  He does continue to have generalized aches and pains but this is most likely due to his activity level.  Patient denies any other  problems at present time.  He has not had any other cardiovascular, respiratory, gastrointestinal, urologic or neurologic complaints.    Review of Systems   Constitutional: Negative for activity change, appetite change and fatigue.   Respiratory: Negative for cough, shortness of breath and wheezing.    Cardiovascular: Negative for chest pain, palpitations and leg swelling.   Gastrointestinal: Negative for abdominal distention, abdominal pain, blood in stool, constipation, diarrhea, nausea and vomiting.   Genitourinary: Negative for difficulty urinating, dysuria, enuresis, frequency, hematuria and urgency.   Musculoskeletal: Positive for arthralgias. Negative for gait problem, joint swelling and myalgias.   Neurological: Negative for dizziness, tremors, seizures, weakness and light-headedness.   Psychiatric/Behavioral: Negative for dysphoric mood and sleep disturbance. The patient is not nervous/anxious.        Objective   Vital Signs:  /78 (BP Location: Left arm, Patient Position: Sitting, Cuff Size: Adult)   Pulse 68   Temp 98.6 °F (37 °C) (Temporal)   Wt 60.3 kg (133 lb)   SpO2 95%   BMI 19.08 kg/m²     Physical Exam  Vitals and nursing note reviewed.   Constitutional:       Appearance: Normal appearance.   HENT:      Head: Normocephalic and atraumatic.      Nose: Nose normal.      Mouth/Throat:      Pharynx: Oropharynx is clear.   Eyes:      Extraocular Movements: Extraocular movements intact.      Pupils: Pupils are equal, round, and reactive to light.   Neck:      Thyroid: No thyroid mass or thyromegaly.      Trachea: Trachea normal.   Cardiovascular:      Rate and Rhythm: Normal rate and regular rhythm.      Pulses: Normal pulses. No decreased pulses.      Heart sounds: Normal heart sounds.   Pulmonary:      Effort: Pulmonary effort is normal.      Breath sounds: Normal breath sounds.   Abdominal:      General: Abdomen is flat. Bowel sounds are normal.      Palpations: Abdomen is soft.       Tenderness: There is no abdominal tenderness.   Musculoskeletal:      Cervical back: Neck supple.      Right lower leg: No edema.      Left lower leg: No edema.   Lymphadenopathy:      Cervical: No cervical adenopathy.   Skin:     General: Skin is warm and dry.   Neurological:      General: No focal deficit present.      Mental Status: He is alert and oriented to person, place, and time.      Sensory: Sensation is intact.      Motor: Motor function is intact.      Coordination: Coordination is intact.   Psychiatric:         Attention and Perception: Attention normal.         Mood and Affect: Mood normal.         Speech: Speech normal.         Behavior: Behavior normal.                         Assessment and Plan   Diagnoses and all orders for this visit:    1. Well adult exam (Primary)   Patient had wellness exam performed today.  There was no abnormality noted on exam.  He did well with respect to his function and cognition.  Patient's fall risk is acceptable as his previous falls were associated with him hiking through the woods climbing up hill sides.  We have encouraged him to be careful but he is quite active and I do suspect that this may be issues in the future.  His anxiety/depression assessments were appropriate.  We did discuss anticipatory guidance as well as safety concerns.  We will continue to monitor and address.  Hopefully his insurance will cover his tetanus shot his shingle vaccine through the pharmacy.    2. Essential hypertension   Patient's blood pressure doing well.  No adjustments are necessary present time.    3. IHD (ischemic heart disease)    Patient has not had any issues with respect to his ischemic heart disease.  We will continue to monitor and will treat accordingly.    4. Dyslipidemia   Previous lipid profile was acceptable.  No adjustments are necessary at present time.    5. Pulmonary nodule 1 cm or greater in diameter   We are still awaiting the PET/CT scan to be performed for  evaluation of the 3.9 cm pulmonary nodule of his left lower lobe.  If it does show pathologic increase in activity we will make referral for probable biopsy.    6. Panlobular emphysema   Patient is doing well at present time with respect to his emphysema.  He has not had any worsening wheezing.  His exam did not reveal any abnormality.  We will not make any changes currently.    7. Elevated hemoglobin A1c   Last hemoglobin A1c was within acceptable range.  We will continue with close observation will monitor and if there is other issues or concerns further assessment treatment will be necessary.    8. Need for zoster vaccination   We will send a request to the pharmacy with respect to his Shingrix and tetanus boosters to see if they are covered with his insurance.  -     Zoster Vac Recomb Adjuvanted 50 MCG/0.5ML reconstituted suspension; Inject 0.5 mL into the appropriate muscle as directed by prescriber 1 (One) Time for 1 dose.  Dispense: 2 each; Refill: 0  -     Tdap (Adacel) 5-2-15.5 LF-MCG/0.5 injection; Inject 0.5 mL into the appropriate muscle as directed by prescriber 1 (One) Time for 1 dose.  Dispense: 0.5 mL; Refill: 0             Follow Up   Return in about 3 months (around 7/18/2023) for Recheck.  Patient was given instructions and counseling regarding his condition or for health maintenance advice. Please see specific information pulled into the AVS if appropriate.

## 2023-05-02 ENCOUNTER — HOSPITAL ENCOUNTER (OUTPATIENT)
Dept: PET IMAGING | Facility: HOSPITAL | Age: 83
Discharge: HOME | End: 2023-05-02
Payer: MEDICARE

## 2023-05-02 DIAGNOSIS — R91.1 PULMONARY NODULE 1 CM OR GREATER IN DIAMETER: ICD-10-CM

## 2023-05-02 LAB — GLUCOSE BLDC GLUCOMTR-MCNC: 116 MG/DL (ref 70–130)

## 2023-05-02 PROCEDURE — 82948 REAGENT STRIP/BLOOD GLUCOSE: CPT

## 2023-05-02 PROCEDURE — 0 FLUDEOXYGLUCOSE F18 SOLUTION: Performed by: FAMILY MEDICINE

## 2023-05-02 PROCEDURE — 78815 PET IMAGE W/CT SKULL-THIGH: CPT

## 2023-05-02 PROCEDURE — A9552 F18 FDG: HCPCS | Performed by: FAMILY MEDICINE

## 2023-05-02 RX ADMIN — FLUDEOXYGLUCOSE F18 1 DOSE: 300 INJECTION INTRAVENOUS at 08:02

## 2023-05-03 RX ORDER — SIMVASTATIN 40 MG
TABLET ORAL
Qty: 90 TABLET | Refills: 3 | Status: SHIPPED | OUTPATIENT
Start: 2023-05-03

## 2023-05-03 NOTE — TELEPHONE ENCOUNTER
"Caller: Lucio Castañeda \"Pito\"    Relationship: Self    Best call back number: [phone]    Requested Prescriptions:   Requested Prescriptions     Pending Prescriptions Disp Refills   • simvastatin (ZOCOR) 40 MG tablet [Pharmacy Med Name: SIMVASTATIN 40 MG TABLET 40 Tablet] 90 tablet 3     Sig: TAKE 1 TABLET BY MOUTH DAILY        Pharmacy where request should be sent: [address] [facility] - [phone] [facility] [phone]      Last office visit with prescribing clinician: 4/4/2023   Last telemedicine visit with prescribing clinician: Visit date not found   Next office visit with prescribing clinician: Visit date not found     Additional details provided by patient: COMPLETELY OUT    Does the patient have less than a 3 day supply:  [x] Yes  [] No    Would you like a call back once the refill request has been completed: [] Yes [x] No    If the office needs to give you a call back, can they leave a voicemail: [] Yes [x] No    Momo Aponte Rep   05/03/23 10:13 EDT           "

## 2023-05-26 RX ORDER — CARVEDILOL 12.5 MG/1
TABLET ORAL
Qty: 180 TABLET | Refills: 0 | Status: SHIPPED | OUTPATIENT
Start: 2023-05-26

## 2023-06-05 RX ORDER — FLUTICASONE FUROATE, UMECLIDINIUM BROMIDE AND VILANTEROL TRIFENATATE 200; 62.5; 25 UG/1; UG/1; UG/1
POWDER RESPIRATORY (INHALATION)
Qty: 60 EACH | Refills: 11 | Status: SHIPPED | OUTPATIENT
Start: 2023-06-05

## 2023-08-01 ENCOUNTER — TELEPHONE (OUTPATIENT)
Dept: FAMILY MEDICINE CLINIC | Facility: CLINIC | Age: 83
End: 2023-08-01
Payer: MEDICARE

## 2023-08-01 DIAGNOSIS — R91.1 PULMONARY NODULE 1 CM OR GREATER IN DIAMETER: Primary | ICD-10-CM

## 2023-08-02 ENCOUNTER — TELEPHONE (OUTPATIENT)
Dept: FAMILY MEDICINE CLINIC | Facility: CLINIC | Age: 83
End: 2023-08-02
Payer: MEDICARE

## 2023-08-25 RX ORDER — CARVEDILOL 12.5 MG/1
12.5 TABLET ORAL 2 TIMES DAILY
Qty: 180 TABLET | Refills: 0 | Status: SHIPPED | OUTPATIENT
Start: 2023-08-25

## 2023-09-15 RX ORDER — OMEPRAZOLE 40 MG/1
40 CAPSULE, DELAYED RELEASE ORAL DAILY
Qty: 90 CAPSULE | Refills: 0 | Status: SHIPPED | OUTPATIENT
Start: 2023-09-15

## 2023-10-06 RX ORDER — CLOPIDOGREL BISULFATE 75 MG/1
75 TABLET ORAL DAILY
Qty: 90 TABLET | Refills: 0 | Status: SHIPPED | OUTPATIENT
Start: 2023-10-06

## 2023-11-13 ENCOUNTER — CLINICAL SUPPORT (OUTPATIENT)
Dept: FAMILY MEDICINE CLINIC | Facility: CLINIC | Age: 83
End: 2023-11-13
Payer: MEDICARE

## 2023-11-13 DIAGNOSIS — Z23 FLU VACCINE NEED: Primary | ICD-10-CM

## 2023-11-24 RX ORDER — CARVEDILOL 12.5 MG/1
TABLET ORAL
Qty: 60 TABLET | Refills: 0 | Status: SHIPPED | OUTPATIENT
Start: 2023-11-24

## 2023-11-27 ENCOUNTER — OFFICE VISIT (OUTPATIENT)
Dept: FAMILY MEDICINE CLINIC | Facility: CLINIC | Age: 83
End: 2023-11-27
Payer: MEDICARE

## 2023-11-27 VITALS
OXYGEN SATURATION: 96 % | WEIGHT: 127 LBS | HEIGHT: 70 IN | TEMPERATURE: 98 F | SYSTOLIC BLOOD PRESSURE: 130 MMHG | HEART RATE: 50 BPM | DIASTOLIC BLOOD PRESSURE: 82 MMHG | RESPIRATION RATE: 18 BRPM | BODY MASS INDEX: 18.18 KG/M2

## 2023-11-27 DIAGNOSIS — R42 VERTIGO: Primary | ICD-10-CM

## 2023-11-27 PROCEDURE — 3075F SYST BP GE 130 - 139MM HG: CPT | Performed by: PHYSICIAN ASSISTANT

## 2023-11-27 PROCEDURE — 99213 OFFICE O/P EST LOW 20 MIN: CPT | Performed by: PHYSICIAN ASSISTANT

## 2023-11-27 PROCEDURE — 3079F DIAST BP 80-89 MM HG: CPT | Performed by: PHYSICIAN ASSISTANT

## 2023-11-27 RX ORDER — PREDNISONE 10 MG/1
60 TABLET ORAL DAILY
Qty: 18 TABLET | Refills: 0 | Status: SHIPPED | OUTPATIENT
Start: 2023-11-27 | End: 2023-12-04

## 2023-12-02 NOTE — PROGRESS NOTES
Follow Up Office Visit      Date: 2023   Patient Name: Lucio Castañeda  : 1940   MRN: [id number]     Chief Complaint:    Chief Complaint   Patient presents with    Dizziness     Vertigo. Started Thursday        History of Present Illness: Lucio Castañeda is a 83 y.o. male who is here today for acute onset dizziness a sensation of the room spinning that began when he turned over in bed yesterday.  He reports that when he rises from a sitting position to a standing position or when he turns over in bed he is having episodes of the room spinning.  When he is walking in hallways he is on his left him.  He has had no other neurologic symptoms including changes in his speech he denies any palpitations or other cardiac symptoms.  He has had no swelling of his lower extremities or nausea or vomiting.  He has had dizziness in the past associated with congestion.  He offers no additional complaints.    Subjective      Review of Systems:   Review of Systems   Constitutional: Negative.    HENT: Negative.     Respiratory: Negative.     Cardiovascular: Negative.    Neurological:  Positive for dizziness and light-headedness. Negative for tremors, seizures, syncope, facial asymmetry, speech difficulty, numbness, headache, memory problem and confusion.     I have reviewed the patients family history, social history, past medical history, past surgical history and have updated it as appropriate.     Medications:     Current Outpatient Medications:     amLODIPine (NORVASC) 10 MG tablet, TAKE ONE TABLET BY MOUTH EVERY DAY, Disp: 90 tablet, Rfl: 3    aspirin 81 MG tablet, Take 1 tablet by mouth Daily., Disp: , Rfl:     carvedilol (COREG) 12.5 MG tablet, TAKE ONE TABLET BY MOUTH TWO TIMES A DAY. NEEDS APPT IN OCTOBER, Disp: 60 tablet, Rfl: 0    clopidogrel (PLAVIX) 75 MG tablet, Take 1 tablet by mouth Daily. Appointment needed., Disp: 90 tablet, Rfl: 0    omeprazole (priLOSEC) 40 MG capsule, Take 1 capsule by mouth Daily.  "Needs appointment., Disp: 90 capsule, Rfl: 0    simvastatin (ZOCOR) 40 MG tablet, TAKE 1 TABLET BY MOUTH DAILY, Disp: 90 tablet, Rfl: 3    Trelegy Ellipta 200-62.5-25 MCG/ACT aerosol powder , INHALE 1 PUFF ONCE DAILY **RINSE MOUTH AFTER EACH USE**, Disp: 60 each, Rfl: 11    predniSONE (DELTASONE) 10 MG tablet, Take 6 tablets by mouth Daily., Disp: 18 tablet, Rfl: 0    Current Facility-Administered Medications:     neomycin-polymyxin-hydrocortisone (CORTISPORIN) 1 % otic solution solution 3 drop, 3 drop, Both Ears, 4x Daily, Yari Morel PA-C    Allergies:   Allergies   Allergen Reactions    Morphine Hallucinations       Objective     Physical Exam: Please see above  Vital Signs:   Vitals:    11/27/23 1308   BP: 130/82   BP Location: Right arm   Patient Position: Sitting   Cuff Size: Adult   Pulse: 50   Resp: 18   Temp: 98 °F (36.7 °C)   TempSrc: Temporal   SpO2: 96%   Weight: 57.6 kg (127 lb)   Height: 177.8 cm (70\")     Body mass index is 18.22 kg/m².    Physical Exam  Vitals and nursing note reviewed.   Constitutional:       General: He is not in acute distress.     Appearance: He is not ill-appearing or toxic-appearing.   Cardiovascular:      Rate and Rhythm: Normal rate and regular rhythm.      Heart sounds: No murmur heard.     No friction rub. No gallop.   Pulmonary:      Effort: Pulmonary effort is normal. No respiratory distress.      Breath sounds: Normal breath sounds. No wheezing or rales.   Neurological:      General: No focal deficit present.      Mental Status: He is alert. Mental status is at baseline.      Motor: No weakness.      Gait: Gait abnormal.      Comments: Mild nystagmus was noted for rapid movement of his head     Procedures    Assessment / Plan      Assessment/Plan:   1. Vertigo  Patient was advised to watch for signs of any other neurologic manifestations as well as any pulmonary or cardiac events and return to clinic immediately.  We will try a quick burst of steroids 6% already. "  Is been advised that chiropractic care for dizziness would be a possibility as well  - predniSONE (DELTASONE) 10 MG tablet; Take 6 tablets by mouth Daily.  Dispense: 18 tablet; Refill: 0       Follow Up:   No follow-ups on file.      At [facility], we believe that sharing information builds trust and better relationships. You are receiving this note because you recently visited [facility]. It is possible you will see health information before a provider has talked with you about it. This kind of information can be easy to misunderstand. To help you fully understand what it means for your health, we urge you to discuss this note with your provider.    Yari Morel PA-C  [facility]

## 2023-12-04 ENCOUNTER — TELEPHONE (OUTPATIENT)
Dept: FAMILY MEDICINE CLINIC | Facility: CLINIC | Age: 83
End: 2023-12-04
Payer: MEDICARE

## 2023-12-04 ENCOUNTER — OFFICE VISIT (OUTPATIENT)
Dept: FAMILY MEDICINE CLINIC | Facility: CLINIC | Age: 83
End: 2023-12-04
Payer: MEDICARE

## 2023-12-04 VITALS
TEMPERATURE: 98.2 F | OXYGEN SATURATION: 99 % | WEIGHT: 132 LBS | HEART RATE: 55 BPM | RESPIRATION RATE: 16 BRPM | BODY MASS INDEX: 18.9 KG/M2 | SYSTOLIC BLOOD PRESSURE: 150 MMHG | HEIGHT: 70 IN | DIASTOLIC BLOOD PRESSURE: 84 MMHG

## 2023-12-04 DIAGNOSIS — R42 VERTIGO: ICD-10-CM

## 2023-12-04 DIAGNOSIS — R42 VERTIGO: Primary | ICD-10-CM

## 2023-12-04 PROCEDURE — 3079F DIAST BP 80-89 MM HG: CPT | Performed by: PHYSICIAN ASSISTANT

## 2023-12-04 PROCEDURE — 3077F SYST BP >= 140 MM HG: CPT | Performed by: PHYSICIAN ASSISTANT

## 2023-12-04 PROCEDURE — 99214 OFFICE O/P EST MOD 30 MIN: CPT | Performed by: PHYSICIAN ASSISTANT

## 2023-12-04 RX ORDER — PREDNISONE 10 MG/1
TABLET ORAL
Qty: 36 TABLET | Refills: 0 | Status: SHIPPED | OUTPATIENT
Start: 2023-12-04 | End: 2023-12-11

## 2023-12-05 RX ORDER — PREDNISONE 10 MG/1
TABLET ORAL
Qty: 18 TABLET | Refills: 0 | Status: SHIPPED | OUTPATIENT
Start: 2023-12-05

## 2023-12-06 NOTE — PROGRESS NOTES
Follow Up Office Visit      Date: 2023   Patient Name: Lucio Castañeda  : 1940   MRN: [id number]     Chief Complaint:    Chief Complaint   Patient presents with    Follow-up     vertigo       History of Present Illness: Lucio Castañeda is a 83 y.o. male who is here today for follow-up of his recent vertigo.  He reports that he has had minimal or improvement in his symptoms with the 60 mg dose of prednisone for 3 days.  He continues to be unsteady on his feet and having sensations of the room spinning when he goes from lying to standing.  He has had no other new neurologic symptoms and otherwise feels well he continues to ambulate holding onto his wife or the wall due to dizziness.    Subjective      Review of Systems:   Review of Systems   Constitutional: Negative.    Neurological:  Positive for dizziness and light-headedness. Negative for tremors, seizures, syncope, facial asymmetry, speech difficulty, weakness, numbness, headache, memory problem and confusion.   Psychiatric/Behavioral: Negative.       I have reviewed the patients family history, social history, past medical history, past surgical history and have updated it as appropriate.     Medications:     Current Outpatient Medications:     amLODIPine (NORVASC) 10 MG tablet, TAKE ONE TABLET BY MOUTH EVERY DAY, Disp: 90 tablet, Rfl: 3    aspirin 81 MG tablet, Take 1 tablet by mouth Daily., Disp: , Rfl:     carvedilol (COREG) 12.5 MG tablet, TAKE ONE TABLET BY MOUTH TWO TIMES A DAY. NEEDS APPT IN OCTOBER, Disp: 60 tablet, Rfl: 0    clopidogrel (PLAVIX) 75 MG tablet, Take 1 tablet by mouth Daily. Appointment needed., Disp: 90 tablet, Rfl: 0    omeprazole (priLOSEC) 40 MG capsule, Take 1 capsule by mouth Daily. Needs appointment., Disp: 90 capsule, Rfl: 0    simvastatin (ZOCOR) 40 MG tablet, TAKE 1 TABLET BY MOUTH DAILY, Disp: 90 tablet, Rfl: 3    Trelegy Ellipta 200-62.5-25 MCG/ACT aerosol powder , INHALE 1 PUFF ONCE DAILY **RINSE MOUTH AFTER  "EACH USE**, Disp: 60 each, Rfl: 11    predniSONE (DELTASONE) 10 MG tablet, TAKE 6 TABLETS BY MOUTH DAILY WITH FOOD, Disp: 18 tablet, Rfl: 0    predniSONE (DELTASONE) 10 MG tablet, Take 8 tablets by mouth Daily for 1 day, THEN 7 tablets Daily for 1 day, THEN 6 tablets Daily for 1 day, THEN 5 tablets Daily for 1 day, THEN 4 tablets Daily for 1 day, THEN 3 tablets Daily for 1 day, THEN 2 tablets Daily for 1 day, THEN 1 tablet Daily for 1 day., Disp: 36 tablet, Rfl: 0    Allergies:   Allergies   Allergen Reactions    Morphine Hallucinations       Objective     Physical Exam: Please see above  Vital Signs:   Vitals:    12/04/23 1324 12/04/23 1413 12/04/23 1414 12/04/23 1415   BP: 150/86 140/80 150/84 150/84   BP Location: Left arm Left arm Right arm Left arm   Patient Position: Sitting Lying Sitting Standing   Cuff Size: Adult Adult Adult Adult   Pulse: 56 53 54 55   Resp: 16      Temp: 98.2 °F (36.8 °C)      TempSrc: Temporal      SpO2: 99%      Weight: 59.9 kg (132 lb)      Height: 177.8 cm (70\")        Body mass index is 18.94 kg/m².  BMI is within normal parameters. No other follow-up for BMI required.       Physical Exam  Vitals and nursing note reviewed.   Constitutional:       General: He is not in acute distress.     Appearance: Normal appearance. He is ill-appearing. He is not toxic-appearing.   Eyes:      Extraocular Movements: Extraocular movements intact.      Pupils: Pupils are equal, round, and reactive to light.   Cardiovascular:      Rate and Rhythm: Normal rate and regular rhythm.      Heart sounds: No murmur heard.     No friction rub. No gallop.   Pulmonary:      Effort: Pulmonary effort is normal. No respiratory distress.      Breath sounds: Normal breath sounds. No wheezing or rales.   Neurological:      General: No focal deficit present.      Mental Status: He is alert and oriented to person, place, and time.      Cranial Nerves: No cranial nerve deficit.      Motor: No weakness.      Gait: Gait " abnormal (unsteady).     Procedures    Assessment / Plan      Assessment/Plan:   1. Vertigo  We will consult chiropractic for Epley maneuver and try to refrain from use of steroid taper due to his upcoming skin cancer surgery.  If he fails to improve with chiropractic care we will pursue the prednisone taper      Follow Up:   No follow-ups on file.      At [facility], we believe that sharing information builds trust and better relationships. You are receiving this note because you recently visited [facility]. It is possible you will see health information before a provider has talked with you about it. This kind of information can be easy to misunderstand. To help you fully understand what it means for your health, we urge you to discuss this note with your provider.    Yari Morel PA-C  [facility]

## 2023-12-25 RX ORDER — CARVEDILOL 12.5 MG/1
12.5 TABLET ORAL 2 TIMES DAILY WITH MEALS
Qty: 180 TABLET | Refills: 0 | Status: SHIPPED | OUTPATIENT
Start: 2023-12-25

## 2023-12-28 ENCOUNTER — LAB (OUTPATIENT)
Dept: FAMILY MEDICINE CLINIC | Facility: CLINIC | Age: 83
End: 2023-12-28
Payer: MEDICARE

## 2023-12-28 ENCOUNTER — OFFICE VISIT (OUTPATIENT)
Dept: FAMILY MEDICINE CLINIC | Facility: CLINIC | Age: 83
End: 2023-12-28
Payer: MEDICARE

## 2023-12-28 VITALS
DIASTOLIC BLOOD PRESSURE: 72 MMHG | WEIGHT: 134 LBS | TEMPERATURE: 97.8 F | RESPIRATION RATE: 24 BRPM | HEART RATE: 54 BPM | OXYGEN SATURATION: 95 % | SYSTOLIC BLOOD PRESSURE: 128 MMHG | BODY MASS INDEX: 19.18 KG/M2 | HEIGHT: 70 IN

## 2023-12-28 DIAGNOSIS — E78.5 DYSLIPIDEMIA: ICD-10-CM

## 2023-12-28 DIAGNOSIS — R06.02 SHORTNESS OF BREATH: ICD-10-CM

## 2023-12-28 DIAGNOSIS — J43.1 PANLOBULAR EMPHYSEMA: ICD-10-CM

## 2023-12-28 DIAGNOSIS — I10 ESSENTIAL HYPERTENSION: ICD-10-CM

## 2023-12-28 DIAGNOSIS — Z23 NEED FOR ZOSTER VACCINATION: ICD-10-CM

## 2023-12-28 DIAGNOSIS — I25.9 IHD (ISCHEMIC HEART DISEASE): ICD-10-CM

## 2023-12-28 DIAGNOSIS — E46 PROTEIN-CALORIE MALNUTRITION, UNSPECIFIED SEVERITY: ICD-10-CM

## 2023-12-28 DIAGNOSIS — R73.09 ELEVATED HEMOGLOBIN A1C: ICD-10-CM

## 2023-12-28 DIAGNOSIS — I10 ESSENTIAL HYPERTENSION: Primary | ICD-10-CM

## 2023-12-28 LAB
ALBUMIN SERPL-MCNC: 4.2 G/DL (ref 3.5–5.2)
ALBUMIN/GLOB SERPL: 1.6 G/DL
ALP SERPL-CCNC: 48 U/L (ref 39–117)
ALT SERPL W P-5'-P-CCNC: 11 U/L (ref 1–41)
ANION GAP SERPL CALCULATED.3IONS-SCNC: 11 MMOL/L (ref 5–15)
AST SERPL-CCNC: 13 U/L (ref 1–40)
BILIRUB SERPL-MCNC: 0.4 MG/DL (ref 0–1.2)
BUN SERPL-MCNC: 23 MG/DL (ref 8–23)
BUN/CREAT SERPL: 20.5 (ref 7–25)
CALCIUM SPEC-SCNC: 9.6 MG/DL (ref 8.6–10.5)
CHLORIDE SERPL-SCNC: 104 MMOL/L (ref 98–107)
CHOLEST SERPL-MCNC: 148 MG/DL (ref 0–200)
CO2 SERPL-SCNC: 23 MMOL/L (ref 22–29)
CREAT SERPL-MCNC: 1.12 MG/DL (ref 0.76–1.27)
EGFRCR SERPLBLD CKD-EPI 2021: 65.6 ML/MIN/1.73
GLOBULIN UR ELPH-MCNC: 2.6 GM/DL
GLUCOSE SERPL-MCNC: 146 MG/DL (ref 65–99)
HBA1C MFR BLD: 6.7 % (ref 4.8–5.6)
HDLC SERPL-MCNC: 44 MG/DL (ref 40–60)
LDLC SERPL CALC-MCNC: 83 MG/DL (ref 0–100)
LDLC/HDLC SERPL: 1.84 {RATIO}
NT-PROBNP SERPL-MCNC: 1287 PG/ML (ref 0–1800)
POTASSIUM SERPL-SCNC: 3.6 MMOL/L (ref 3.5–5.2)
PROT SERPL-MCNC: 6.8 G/DL (ref 6–8.5)
SODIUM SERPL-SCNC: 138 MMOL/L (ref 136–145)
TRIGL SERPL-MCNC: 116 MG/DL (ref 0–150)
VLDLC SERPL-MCNC: 21 MG/DL (ref 5–40)

## 2023-12-28 PROCEDURE — 83880 ASSAY OF NATRIURETIC PEPTIDE: CPT | Performed by: FAMILY MEDICINE

## 2023-12-28 PROCEDURE — 80053 COMPREHEN METABOLIC PANEL: CPT | Performed by: FAMILY MEDICINE

## 2023-12-28 PROCEDURE — 1160F RVW MEDS BY RX/DR IN RCRD: CPT | Performed by: FAMILY MEDICINE

## 2023-12-28 PROCEDURE — 3078F DIAST BP <80 MM HG: CPT | Performed by: FAMILY MEDICINE

## 2023-12-28 PROCEDURE — 1159F MED LIST DOCD IN RCRD: CPT | Performed by: FAMILY MEDICINE

## 2023-12-28 PROCEDURE — 3074F SYST BP LT 130 MM HG: CPT | Performed by: FAMILY MEDICINE

## 2023-12-28 PROCEDURE — 83036 HEMOGLOBIN GLYCOSYLATED A1C: CPT | Performed by: FAMILY MEDICINE

## 2023-12-28 PROCEDURE — 80061 LIPID PANEL: CPT | Performed by: FAMILY MEDICINE

## 2023-12-28 PROCEDURE — 99214 OFFICE O/P EST MOD 30 MIN: CPT | Performed by: FAMILY MEDICINE

## 2023-12-28 RX ORDER — CLOSTRIDIUM TETANI TOXOID ANTIGEN (FORMALDEHYDE INACTIVATED), CORYNEBACTERIUM DIPHTHERIAE TOXOID ANTIGEN (FORMALDEHYDE INACTIVATED), BORDETELLA PERTUSSIS TOXOID ANTIGEN (GLUTARALDEHYDE INACTIVATED), BORDETELLA PERTUSSIS FILAMENTOUS HEMAGGLUTININ ANTIGEN (FORMALDEHYDE INACTIVATED), BORDETELLA PERTUSSIS PERTACTIN ANTIGEN, AND BORDETELLA PERTUSSIS FIMBRIAE 2/3 ANTIGEN 5; 2; 2.5; 5; 3; 5 [LF]/.5ML; [LF]/.5ML; UG/.5ML; UG/.5ML; UG/.5ML; UG/.5ML
0.5 INJECTION, SUSPENSION INTRAMUSCULAR ONCE
Qty: 0.5 ML | Refills: 0 | Status: SHIPPED | OUTPATIENT
Start: 2023-12-28 | End: 2023-12-28

## 2023-12-28 NOTE — PROGRESS NOTES
Follow Up Office Visit      Date: 2023   Patient Name: Lucio Castañeda  : 1940   MRN: [id number]     Chief Complaint:    Chief Complaint   Patient presents with    Follow-up     3 month , medications       History of Present Illness: Lucio Castañeda is a 83 y.o. male who is here today for follow-up.  Patient states he has been doing relatively well since last being seen.  He does continue to take his medication as prescribed without side effects.  He has not had any changes in his activity, appetite or sleep.  He does continue to remain active.  He becomes short of breath intermittently but this does appear to be associated with extreme activity.  Patient denies any other cardiovascular, respiratory, gastrointestinal, urologic or neurologic complaints.    Subjective      Review of Systems:   Review of Systems   Constitutional:  Negative for activity change, appetite change and fatigue.   Respiratory:  Negative for cough, shortness of breath and wheezing.    Cardiovascular:  Negative for chest pain, palpitations and leg swelling.   Gastrointestinal:  Negative for abdominal pain, blood in stool, constipation, diarrhea, nausea, vomiting, GERD and indigestion.   Genitourinary:  Negative for dysuria, flank pain, frequency and urgency.   Musculoskeletal:  Negative for arthralgias and myalgias.   Neurological:  Negative for dizziness, tremors, seizures, syncope, weakness, light-headedness, numbness, headache, memory problem and confusion.   Psychiatric/Behavioral:  Negative for sleep disturbance. The patient is not nervous/anxious.        I have reviewed the patients family history, social history, past medical history, past surgical history and have updated it as appropriate.     Medications:     Current Outpatient Medications:     amLODIPine (NORVASC) 10 MG tablet, TAKE ONE TABLET BY MOUTH EVERY DAY, Disp: 90 tablet, Rfl: 3    aspirin 81 MG tablet, Take 1 tablet by mouth Daily., Disp: , Rfl:      "carvedilol (COREG) 12.5 MG tablet, Take 1 tablet by mouth 2 (Two) Times a Day With Meals., Disp: 180 tablet, Rfl: 0    clopidogrel (PLAVIX) 75 MG tablet, Take 1 tablet by mouth Daily. Appointment needed., Disp: 90 tablet, Rfl: 0    omeprazole (priLOSEC) 40 MG capsule, Take 1 capsule by mouth Daily. Needs appointment., Disp: 90 capsule, Rfl: 0    simvastatin (ZOCOR) 40 MG tablet, TAKE 1 TABLET BY MOUTH DAILY, Disp: 90 tablet, Rfl: 3    Trelegy Ellipta 200-62.5-25 MCG/ACT aerosol powder , INHALE 1 PUFF ONCE DAILY **RINSE MOUTH AFTER EACH USE**, Disp: 60 each, Rfl: 11    Allergies:   Allergies   Allergen Reactions    Morphine Hallucinations       Immunizations:   Immunization History   Administered Date(s) Administered    COVID-19 (MODERNA) 1st,2nd,3rd Dose Monovalent 03/16/2021, 04/14/2021, 11/16/2021    FLUAD TRI 65YR+ 11/15/2019    Flu Vaccine Quad PF >36MO 11/17/2020    Fluad Quad 65+ 11/17/2020    Fluzone (or Fluarix & Flulaval for VFC) >6mos 11/17/2020    Fluzone High-Dose 65+yrs 11/13/2023    Fluzone Quad >6mos (Multi-dose) 08/24/2016    Influenza Inj MDCK Preserative Free 08/24/2016    Influenza, Unspecified 11/16/2018    Pneumococcal Conjugate 13-Valent (PCV13) 08/24/2016    Pneumococcal Polysaccharide (PPSV23) 12/11/2006        Objective     Physical Exam: Please see above  Vital Signs:   Vitals:    12/28/23 1328   BP: 128/72   BP Location: Left arm   Patient Position: Sitting   Cuff Size: Large Adult   Pulse: 54   Resp: 24   Temp: 97.8 °F (36.6 °C)   TempSrc: Temporal   SpO2: 95%   Weight: 60.8 kg (134 lb)   Height: 177.8 cm (70\")     Body mass index is 19.23 kg/m².  BMI is within normal parameters. No other follow-up for BMI required.       Physical Exam  Vitals and nursing note reviewed.   Constitutional:       Appearance: Normal appearance.   HENT:      Head: Normocephalic and atraumatic.      Nose: Nose normal.      Mouth/Throat:      Pharynx: Oropharynx is clear.   Eyes:      Extraocular Movements: " Extraocular movements intact.      Pupils: Pupils are equal, round, and reactive to light.   Neck:      Thyroid: No thyroid mass or thyromegaly.      Trachea: Trachea normal.   Cardiovascular:      Rate and Rhythm: Normal rate and regular rhythm.      Pulses: Normal pulses. No decreased pulses.      Heart sounds: Normal heart sounds.   Pulmonary:      Effort: Pulmonary effort is normal.      Breath sounds: Normal breath sounds.   Abdominal:      General: Abdomen is flat. Bowel sounds are normal.      Palpations: Abdomen is soft.      Tenderness: There is no abdominal tenderness.   Musculoskeletal:      Cervical back: Neck supple.      Right lower leg: No edema.      Left lower leg: No edema.   Lymphadenopathy:      Cervical: No cervical adenopathy.   Skin:     General: Skin is warm and dry.   Neurological:      General: No focal deficit present.      Mental Status: He is alert and oriented to person, place, and time.      Sensory: Sensation is intact.      Motor: Motor function is intact.      Coordination: Coordination is intact.   Psychiatric:         Attention and Perception: Attention normal.         Mood and Affect: Mood normal.         Speech: Speech normal.         Behavior: Behavior normal.         Procedures    Results:   Labs:   Hemoglobin A1C   Date Value Ref Range Status   12/28/2023 6.70 (H) 4.80 - 5.60 % Final     TSH   Date Value Ref Range Status   03/06/2023 1.230 0.270 - 4.200 uIU/mL Final        POCT Results (if applicable):   Results for orders placed or performed in visit on 12/28/23   Comprehensive Metabolic Panel    Specimen: Arm, Right; Blood   Result Value Ref Range    Glucose 146 (H) 65 - 99 mg/dL    BUN 23 8 - 23 mg/dL    Creatinine 1.12 0.76 - 1.27 mg/dL    Sodium 138 136 - 145 mmol/L    Potassium 3.6 3.5 - 5.2 mmol/L    Chloride 104 98 - 107 mmol/L    CO2 23.0 22.0 - 29.0 mmol/L    Calcium 9.6 8.6 - 10.5 mg/dL    Total Protein 6.8 6.0 - 8.5 g/dL    Albumin 4.2 3.5 - 5.2 g/dL    ALT (SGPT)  11 1 - 41 U/L    AST (SGOT) 13 1 - 40 U/L    Alkaline Phosphatase 48 39 - 117 U/L    Total Bilirubin 0.4 0.0 - 1.2 mg/dL    Globulin 2.6 gm/dL    A/G Ratio 1.6 g/dL    BUN/Creatinine Ratio 20.5 7.0 - 25.0    Anion Gap 11.0 5.0 - 15.0 mmol/L    eGFR 65.6 >60.0 mL/min/1.73   Hemoglobin A1c    Specimen: Arm, Right; Blood   Result Value Ref Range    Hemoglobin A1C 6.70 (H) 4.80 - 5.60 %   Lipid Panel    Specimen: Arm, Right; Blood   Result Value Ref Range    Total Cholesterol 148 0 - 200 mg/dL    Triglycerides 116 0 - 150 mg/dL    HDL Cholesterol 44 40 - 60 mg/dL    LDL Cholesterol  83 0 - 100 mg/dL    VLDL Cholesterol 21 5 - 40 mg/dL    LDL/HDL Ratio 1.84    proBNP    Specimen: Arm, Right; Blood   Result Value Ref Range    proBNP 1,287.0 0.0 - 1,800.0 pg/mL       Imaging:   No valid procedures specified.     Measures:   Advanced Care Planning:   Patient has an advance directive in EMR which is still valid.     Smoking Cessation:   Non-smoker.    Assessment / Plan      Assessment/Plan:   Diagnoses and all orders for this visit:    1. Essential hypertension (Primary)  Patient's blood pressure is doing well at present time.  We will continue with his current regimen as he does appear to be tolerating it and will obtain a CMP to assess kidney function.  If he has other problems or complaints further assessment treatment may be necessary.  -     Comprehensive Metabolic Panel; Future    2. Panlobular emphysema   Patient is doing well with respect to his COPD.  He has not having too much issues but is short of breath except with activity.  He is not currently using any rescue inhaler but does continue to use his Trelegy on a daily basis.  We will continue to monitor and if he has other problems or complaints further assessment treatment will be necessary.    3. Elevated hemoglobin A1c  Patient does have a history of elevated hemoglobin A1c.  We will obtain a repeat and will pursue and treat accordingly.  -     Hemoglobin A1c;  Future    4. Dyslipidemia  Patient will have a lipid profile obtained.  We will continue to adjust to keep his LDL less than 70.  If it is not less than 70 we may need to switch to a stronger cholesterol medication.  -     Lipid Panel; Future    5. Need for zoster vaccination  We have discussed the risk and benefits of the shingle vaccine as well as the pertussis booster.  We will send a request to the pharmacy.  -     Tdap (Adacel) 5-2-15.5 LF-MCG/0.5 injection; Inject 0.5 mL into the appropriate muscle as directed by prescriber 1 (One) Time for 1 dose.  Dispense: 0.5 mL; Refill: 0  -     Zoster Vac Recomb Adjuvanted 50 MCG/0.5ML reconstituted suspension; Inject 0.5 mL into the appropriate muscle as directed by prescriber 1 (One) Time for 1 dose.  Dispense: 1 each; Refill: 0    6. IHD (ischemic heart disease)   Patient has not had any issues with respect to his cardiac symptoms at present time.  Nonetheless we will obtain a BNP to assure his there is no underlying pathology.    7. Shortness of breath  He is short of breath with activity.  He denies PND, orthopnea or pedal edema.  Nonetheless we will obtain a BNP to assure his there is no underlying pathology.  -     proBNP; Future    8. Protein-calorie malnutrition, unspecified severity   Patient is doing better with his caloric intake.  We will check a CMP to assess his albumin and total protein.      Follow Up:   Return in about 3 months (around 3/28/2024).      At [facility], we believe that sharing information builds trust and better relationships. You are receiving this note because you recently visited [facility]. It is possible you will see health information before a provider has talked with you about it. This kind of information can be easy to misunderstand. To help you fully understand what it means for your health, we urge you to discuss this note with your provider.    Rocky Morel MD  [facility]

## 2023-12-29 NOTE — PROGRESS NOTES
Patient was informed of results. Patient verbalized good understanding and appreciation. Patient states he is taking his cholesterol medications faithfully.

## 2024-01-07 RX ORDER — CLOPIDOGREL BISULFATE 75 MG/1
TABLET ORAL
Qty: 90 TABLET | Refills: 0 | Status: SHIPPED | OUTPATIENT
Start: 2024-01-07

## 2024-01-26 RX ORDER — SIMVASTATIN 40 MG
TABLET ORAL
Qty: 90 TABLET | Refills: 3 | Status: SHIPPED | OUTPATIENT
Start: 2024-01-26

## 2024-03-15 RX ORDER — OMEPRAZOLE 40 MG/1
40 CAPSULE, DELAYED RELEASE ORAL DAILY
Qty: 90 CAPSULE | Refills: 0 | Status: SHIPPED | OUTPATIENT
Start: 2024-03-15

## 2024-03-25 RX ORDER — CARVEDILOL 12.5 MG/1
12.5 TABLET ORAL 2 TIMES DAILY WITH MEALS
Qty: 180 TABLET | Refills: 0 | Status: SHIPPED | OUTPATIENT
Start: 2024-03-25

## 2024-04-05 RX ORDER — CLOPIDOGREL BISULFATE 75 MG/1
TABLET ORAL
Qty: 90 TABLET | Refills: 0 | Status: SHIPPED | OUTPATIENT
Start: 2024-04-05

## 2024-04-10 ENCOUNTER — CLINICAL SUPPORT (OUTPATIENT)
Dept: FAMILY MEDICINE CLINIC | Facility: CLINIC | Age: 84
End: 2024-04-10
Payer: MEDICARE

## 2024-04-10 ENCOUNTER — OFFICE VISIT (OUTPATIENT)
Dept: FAMILY MEDICINE CLINIC | Facility: CLINIC | Age: 84
End: 2024-04-10
Payer: MEDICARE

## 2024-04-10 VITALS
HEIGHT: 70 IN | DIASTOLIC BLOOD PRESSURE: 54 MMHG | SYSTOLIC BLOOD PRESSURE: 106 MMHG | WEIGHT: 133 LBS | OXYGEN SATURATION: 98 % | HEART RATE: 54 BPM | TEMPERATURE: 98.7 F | BODY MASS INDEX: 19.04 KG/M2

## 2024-04-10 DIAGNOSIS — Z28.21 IMMUNIZATION REFUSED: ICD-10-CM

## 2024-04-10 DIAGNOSIS — R73.09 ELEVATED HEMOGLOBIN A1C: ICD-10-CM

## 2024-04-10 DIAGNOSIS — E78.5 DYSLIPIDEMIA: ICD-10-CM

## 2024-04-10 DIAGNOSIS — Z00.00 WELL ADULT EXAM: ICD-10-CM

## 2024-04-10 DIAGNOSIS — R06.02 SHORTNESS OF BREATH: ICD-10-CM

## 2024-04-10 DIAGNOSIS — Z00.00 WELL ADULT EXAM: Primary | ICD-10-CM

## 2024-04-10 DIAGNOSIS — J43.1 PANLOBULAR EMPHYSEMA: ICD-10-CM

## 2024-04-10 DIAGNOSIS — I10 ESSENTIAL HYPERTENSION: ICD-10-CM

## 2024-04-10 LAB
ALBUMIN SERPL-MCNC: 4.1 G/DL (ref 3.5–5.2)
ALBUMIN/GLOB SERPL: 1.4 G/DL
ALP SERPL-CCNC: 52 U/L (ref 39–117)
ALT SERPL W P-5'-P-CCNC: 5 U/L (ref 1–41)
ANION GAP SERPL CALCULATED.3IONS-SCNC: 12.4 MMOL/L (ref 5–15)
AST SERPL-CCNC: 11 U/L (ref 1–40)
BILIRUB SERPL-MCNC: 0.3 MG/DL (ref 0–1.2)
BUN SERPL-MCNC: 23 MG/DL (ref 8–23)
BUN/CREAT SERPL: 19.8 (ref 7–25)
CALCIUM SPEC-SCNC: 9.3 MG/DL (ref 8.6–10.5)
CHLORIDE SERPL-SCNC: 104 MMOL/L (ref 98–107)
CHOLEST SERPL-MCNC: 111 MG/DL (ref 0–200)
CO2 SERPL-SCNC: 24.6 MMOL/L (ref 22–29)
CREAT SERPL-MCNC: 1.16 MG/DL (ref 0.76–1.27)
DEPRECATED RDW RBC AUTO: 40.5 FL (ref 37–54)
EGFRCR SERPLBLD CKD-EPI 2021: 62.9 ML/MIN/1.73
ERYTHROCYTE [DISTWIDTH] IN BLOOD BY AUTOMATED COUNT: 12.5 % (ref 12.3–15.4)
GLOBULIN UR ELPH-MCNC: 3 GM/DL
GLUCOSE SERPL-MCNC: 117 MG/DL (ref 65–99)
HBA1C MFR BLD: 6 % (ref 4.8–5.6)
HCT VFR BLD AUTO: 42.1 % (ref 37.5–51)
HDLC SERPL-MCNC: 37 MG/DL (ref 40–60)
HGB BLD-MCNC: 13.9 G/DL (ref 13–17.7)
LDLC SERPL CALC-MCNC: 59 MG/DL (ref 0–100)
LDLC/HDLC SERPL: 1.62 {RATIO}
MCH RBC QN AUTO: 29.1 PG (ref 26.6–33)
MCHC RBC AUTO-ENTMCNC: 33 G/DL (ref 31.5–35.7)
MCV RBC AUTO: 88.3 FL (ref 79–97)
NT-PROBNP SERPL-MCNC: 1451 PG/ML (ref 0–1800)
PLATELET # BLD AUTO: 252 10*3/MM3 (ref 140–450)
PMV BLD AUTO: 9.6 FL (ref 6–12)
POTASSIUM SERPL-SCNC: 3.6 MMOL/L (ref 3.5–5.2)
PROT SERPL-MCNC: 7.1 G/DL (ref 6–8.5)
RBC # BLD AUTO: 4.77 10*6/MM3 (ref 4.14–5.8)
SODIUM SERPL-SCNC: 141 MMOL/L (ref 136–145)
TRIGL SERPL-MCNC: 70 MG/DL (ref 0–150)
TSH SERPL DL<=0.05 MIU/L-ACNC: 1.35 UIU/ML (ref 0.27–4.2)
VLDLC SERPL-MCNC: 15 MG/DL (ref 5–40)
WBC NRBC COR # BLD AUTO: 9.4 10*3/MM3 (ref 3.4–10.8)

## 2024-04-10 PROCEDURE — 80061 LIPID PANEL: CPT | Performed by: FAMILY MEDICINE

## 2024-04-10 PROCEDURE — 3074F SYST BP LT 130 MM HG: CPT | Performed by: FAMILY MEDICINE

## 2024-04-10 PROCEDURE — 83880 ASSAY OF NATRIURETIC PEPTIDE: CPT | Performed by: FAMILY MEDICINE

## 2024-04-10 PROCEDURE — 96160 PT-FOCUSED HLTH RISK ASSMT: CPT | Performed by: FAMILY MEDICINE

## 2024-04-10 PROCEDURE — 84443 ASSAY THYROID STIM HORMONE: CPT | Performed by: FAMILY MEDICINE

## 2024-04-10 PROCEDURE — 80053 COMPREHEN METABOLIC PANEL: CPT | Performed by: FAMILY MEDICINE

## 2024-04-10 PROCEDURE — 1159F MED LIST DOCD IN RCRD: CPT | Performed by: FAMILY MEDICINE

## 2024-04-10 PROCEDURE — G0439 PPPS, SUBSEQ VISIT: HCPCS | Performed by: FAMILY MEDICINE

## 2024-04-10 PROCEDURE — 83036 HEMOGLOBIN GLYCOSYLATED A1C: CPT | Performed by: FAMILY MEDICINE

## 2024-04-10 PROCEDURE — 1170F FXNL STATUS ASSESSED: CPT | Performed by: FAMILY MEDICINE

## 2024-04-10 PROCEDURE — 99214 OFFICE O/P EST MOD 30 MIN: CPT | Performed by: FAMILY MEDICINE

## 2024-04-10 PROCEDURE — 3078F DIAST BP <80 MM HG: CPT | Performed by: FAMILY MEDICINE

## 2024-04-10 PROCEDURE — 1160F RVW MEDS BY RX/DR IN RCRD: CPT | Performed by: FAMILY MEDICINE

## 2024-04-10 PROCEDURE — 36415 COLL VENOUS BLD VENIPUNCTURE: CPT | Performed by: FAMILY MEDICINE

## 2024-04-10 PROCEDURE — 85027 COMPLETE CBC AUTOMATED: CPT | Performed by: FAMILY MEDICINE

## 2024-04-10 RX ORDER — AMLODIPINE BESYLATE 10 MG/1
TABLET ORAL
Qty: 90 TABLET | Refills: 3 | Status: SHIPPED | OUTPATIENT
Start: 2024-04-10

## 2024-04-10 RX ORDER — CLOSTRIDIUM TETANI TOXOID ANTIGEN (FORMALDEHYDE INACTIVATED), CORYNEBACTERIUM DIPHTHERIAE TOXOID ANTIGEN (FORMALDEHYDE INACTIVATED), BORDETELLA PERTUSSIS TOXOID ANTIGEN (GLUTARALDEHYDE INACTIVATED), BORDETELLA PERTUSSIS FILAMENTOUS HEMAGGLUTININ ANTIGEN (FORMALDEHYDE INACTIVATED), BORDETELLA PERTUSSIS PERTACTIN ANTIGEN, AND BORDETELLA PERTUSSIS FIMBRIAE 2/3 ANTIGEN 5; 2; 2.5; 5; 3; 5 [LF]/.5ML; [LF]/.5ML; UG/.5ML; UG/.5ML; UG/.5ML; UG/.5ML
0.5 INJECTION, SUSPENSION INTRAMUSCULAR ONCE
Qty: 0.5 ML | Refills: 0 | Status: SHIPPED | OUTPATIENT
Start: 2024-04-10 | End: 2024-04-10

## 2024-04-10 NOTE — PROGRESS NOTES
The ABCs of the Annual Wellness Visit  Subsequent Medicare Wellness Visit    Subjective    Lucio Castañeda is a 83 y.o. male who presents for a Subsequent Medicare Wellness Visit.    The following portions of the patient's history were reviewed and   updated as appropriate: allergies, current medications, past family history, past medical history, past social history, past surgical history, and problem list.    Compared to one year ago, the patient feels his physical   health is the same.    Compared to one year ago, the patient feels his mental   health is the same.    Recent Hospitalizations:  He was not admitted to the hospital during the last year.       Current Medical Providers:  Patient Care Team:  Rocky Morel MD as PCP - General (Family Medicine)  Rocky Morel MD as Referring Physician (Family Medicine)    Outpatient Medications Prior to Visit   Medication Sig Dispense Refill    amLODIPine (NORVASC) 10 MG tablet TAKE ONE TABLET BY MOUTH EVERY DAY 90 tablet 3    aspirin 81 MG tablet Take 1 tablet by mouth Daily.      carvedilol (COREG) 12.5 MG tablet TAKE 1 TABLET BY MOUTH 2 (TWO) TIMES A DAY WITH MEALS. 180 tablet 0    clopidogrel (PLAVIX) 75 MG tablet TAKE ONE TABLET BY MOUTH EVERY DAY *NEEDS APPT* 90 tablet 0    omeprazole (priLOSEC) 40 MG capsule Take 1 capsule by mouth Daily. 90 capsule 0    simvastatin (ZOCOR) 40 MG tablet TAKE 1 TABLET BY MOUTH DAILY 90 tablet 3    Trelegy Ellipta 200-62.5-25 MCG/ACT aerosol powder  INHALE 1 PUFF ONCE DAILY **RINSE MOUTH AFTER EACH USE** 60 each 11     No facility-administered medications prior to visit.       No opioid medication identified on active medication list. I have reviewed chart for other potential  high risk medication/s and harmful drug interactions in the elderly.        Aspirin is on active medication list. Aspirin use is indicated based on review of current medical condition/s. Pros and cons of this therapy have been  "discussed today. Benefits of this medication outweigh potential harm.  Patient has been encouraged to continue taking this medication.  .      Patient Active Problem List   Diagnosis    Essential hypertension    Gastroesophageal reflux disease without esophagitis    IHD (ischemic heart disease)    Diverticul disease small and large intestine, no perforati or abscess    Dyslipidemia    Carotid stenosis, symptomatic w/o infarct, left    Amaurosis fugax of left eye    COPD (chronic obstructive pulmonary disease)    Hematoma, right femoral     Advance Care Planning   Advance Care Planning     Advance Directive is on file.  ACP discussion was held with the patient during this visit. Patient has an advance directive in EMR which is still valid.      Objective    Vitals:    04/10/24 1313   BP: 106/54   BP Location: Left arm   Patient Position: Sitting   Cuff Size: Adult   Pulse: 54   Temp: 98.7 °F (37.1 °C)   SpO2: 98%   Weight: 60.3 kg (133 lb)   Height: 177.8 cm (70\")     Estimated body mass index is 19.08 kg/m² as calculated from the following:    Height as of this encounter: 177.8 cm (70\").    Weight as of this encounter: 60.3 kg (133 lb).    BMI is within normal parameters. No other follow-up for BMI required.      Does the patient have evidence of cognitive impairment? No          HEALTH RISK ASSESSMENT    Smoking Status:  Social History     Tobacco Use   Smoking Status Former    Current packs/day: 0.00    Average packs/day: 1 pack/day for 28.5 years (28.5 ttl pk-yrs)    Types: Cigarettes    Start date:     Quit date: 7/10/1997    Years since quittin.7    Passive exposure: Past   Smokeless Tobacco Current    Types: Snuff, Chew     Alcohol Consumption:  Social History     Substance and Sexual Activity   Alcohol Use No     Fall Risk Screen:    STEADI Fall Risk Assessment was completed, and patient is at HIGH risk for falls. Assessment completed on:4/10/2024    Depression Screenin/10/2024     1:12 PM "   PHQ-2/PHQ-9 Depression Screening   Little Interest or Pleasure in Doing Things 0-->not at all   Feeling Down, Depressed or Hopeless 0-->not at all   PHQ-9: Brief Depression Severity Measure Score 0       Health Habits and Functional and Cognitive Screenin/10/2024     1:00 PM   Functional & Cognitive Status   Do you have difficulty preparing food and eating? No   Do you have difficulty bathing yourself, getting dressed or grooming yourself? No   Do you have difficulty using the toilet? No   Do you have difficulty moving around from place to place? No   Do you have trouble with steps or getting out of a bed or a chair? No   Current Diet Well Balanced Diet   Dental Exam Up to date   Eye Exam Up to date   Exercise (times per week) 7 times per week   Current Exercises Include Walking   Do you need help using the phone?  No   Are you deaf or do you have serious difficulty hearing?  No   Do you need help to go to places out of walking distance? No   Do you need help shopping? No   Do you need help preparing meals?  No   Do you need help with housework?  No   Do you need help with laundry? No   Do you need help taking your medications? No   Do you need help managing money? No   Do you ever drive or ride in a car without wearing a seat belt? No   Have you felt unusual stress, anger or loneliness in the last month? No   Who do you live with? Spouse   If you need help, do you have trouble finding someone available to you? No   Have you been bothered in the last four weeks by sexual problems? No   Do you have difficulty concentrating, remembering or making decisions? No       Age-appropriate Screening Schedule:  Refer to the list below for future screening recommendations based on patient's age, sex and/or medical conditions. Orders for these recommended tests are listed in the plan section. The patient has been provided with a written plan.    Health Maintenance   Topic Date Due    ANNUAL WELLNESS VISIT  2024     COVID-19 Vaccine (4 - 2023-24 season) 12/26/2024 (Originally 9/1/2023)    ZOSTER VACCINE (1 of 2) 04/08/2025 (Originally 12/2/1991)    RSV Vaccine - Adults (1 - 1-dose 60+ series) 04/10/2025 (Originally 12/2/2001)    TDAP/TD VACCINES (1 - Tdap) 04/10/2025 (Originally [birth year]2/1959)    INFLUENZA VACCINE  08/01/2024    LIPID PANEL  12/28/2024    Pneumococcal Vaccine 65+  Completed                  CMS Preventative Services Quick Reference  Risk Factors Identified During Encounter  Immunizations Discussed/Encouraged: Tdap  The above risks/problems have been discussed with the patient.  Pertinent information has been shared with the patient in the After Visit Summary.  An After Visit Summary and PPPS were made available to the patient.    Follow Up:   Next Medicare Wellness visit to be scheduled in 1 year.       Additional E&M Note during same encounter follows:  Patient has multiple medical problems which are significant and separately identifiable that require additional work above and beyond the Medicare Wellness Visit.      Chief Complaint  Follow-up (labwork), Hyperlipidemia, Hypertension, and COPD    Subjective        HPI  Lucio Castañeda is also being seen today for follow-up of his chronic medical conditions.  Patient been doing relatively well since last being seen.  He states he does continue to take his medication as prescribed without side effects.  He does continue to use the Trelegy on a daily basis but states he does not require any short acting beta agonist.  Patient has not had any other problems currently.  He does continue to take his other prescription medications without complaints.  He has continue with his usual activity, appetite and sleep.  Patient denies any other cardiovascular, respiratory, gastrointestinal, urologic or neurologic complaints.    Review of Systems   Constitutional:  Negative for activity change, appetite change and diaphoresis.   Respiratory:  Negative for cough, chest  "tightness, shortness of breath and wheezing.    Cardiovascular:  Negative for chest pain, palpitations and leg swelling.   Gastrointestinal:  Negative for abdominal distention, abdominal pain, blood in stool, constipation, diarrhea, nausea and vomiting.   Genitourinary:  Negative for difficulty urinating, dysuria, enuresis, frequency, hematuria and urgency.   Musculoskeletal:  Negative for arthralgias, gait problem, joint swelling and myalgias.   Neurological:  Negative for dizziness, tremors, seizures, syncope, weakness, light-headedness and numbness.   Psychiatric/Behavioral:  Negative for dysphoric mood and sleep disturbance. The patient is not nervous/anxious.        Objective   Vital Signs:  /54 (BP Location: Left arm, Patient Position: Sitting, Cuff Size: Adult)   Pulse 54   Temp 98.7 °F (37.1 °C)   Ht 177.8 cm (70\")   Wt 60.3 kg (133 lb)   SpO2 98%   BMI 19.08 kg/m²     Physical Exam  Vitals and nursing note reviewed.   Constitutional:       Appearance: Normal appearance.   HENT:      Head: Normocephalic and atraumatic.      Nose: Nose normal.      Mouth/Throat:      Pharynx: Oropharynx is clear.   Eyes:      Extraocular Movements: Extraocular movements intact.      Pupils: Pupils are equal, round, and reactive to light.   Neck:      Thyroid: No thyroid mass or thyromegaly.      Trachea: Trachea normal.   Cardiovascular:      Rate and Rhythm: Normal rate and regular rhythm.      Pulses: Normal pulses. No decreased pulses.      Heart sounds: Normal heart sounds.   Pulmonary:      Effort: Pulmonary effort is normal.      Breath sounds: Normal breath sounds.   Abdominal:      General: Abdomen is flat. Bowel sounds are normal.      Palpations: Abdomen is soft.      Tenderness: There is no abdominal tenderness.   Musculoskeletal:      Cervical back: Neck supple.      Right lower leg: No edema.      Left lower leg: No edema.   Lymphadenopathy:      Cervical: No cervical adenopathy.   Skin:     General: " Skin is warm and dry.   Neurological:      General: No focal deficit present.      Mental Status: He is alert and oriented to person, place, and time.      Sensory: Sensation is intact.      Motor: Motor function is intact.      Coordination: Coordination is intact.   Psychiatric:         Attention and Perception: Attention normal.         Mood and Affect: Mood normal.         Speech: Speech normal.         Behavior: Behavior normal.          The following data was reviewed by: Rocky Morel MD on 04/10/2024:  Common labs          12/28/2023    13:58   Common Labs   Glucose 146    BUN 23    Creatinine 1.12    Sodium 138    Potassium 3.6    Chloride 104    Calcium 9.6    Albumin 4.2    Total Bilirubin 0.4    Alkaline Phosphatase 48    AST (SGOT) 13    ALT (SGPT) 11    Total Cholesterol 148    Triglycerides 116    HDL Cholesterol 44    LDL Cholesterol  83    Hemoglobin A1C 6.70                 Assessment and Plan   Diagnoses and all orders for this visit:    1. Well adult exam (Primary)  Patient did have a wellness exam performed today that did not reveal any abnormality.  He did well with respect to his function/cognition/anxiety/depression/fall risk assessment.  We have discussed safety issues as well as anticipatory guidance.  We will continue to monitor his symptoms and obtain laboratory data and will pursue accordingly.  If he has other problems or plans prior to his next scheduled follow-up he will contact us.  -     CBC (No Diff); Future  -     Comprehensive Metabolic Panel; Future  -     Hemoglobin A1c; Future  -     Lipid Panel; Future  -     TSH Rfx On Abnormal To Free T4; Future  -     Cancel: Urinalysis without microscopic (no culture) - Urine, Clean Catch; Future    2. Essential hypertension   Patient's blood pressure is running little bit on the low side.  His blood pressure has been doing relatively well at home.  We have asked him to monitor his blood pressure and if he does continue to  have a low systolic blood pressure we will cut back on the amlodipine to 5 mg with possible complete discontinuation.  We will continue to monitor and obtain kidney functions and will pursue and treat accordingly.    3. Elevated hemoglobin A1c   Patient does have a history of elevated hemoglobin A1c.  We will repeat hemoglobin A1c will make adjustments based on these findings.  We will only initiate treatment if his hemoglobin A1c is above 7%.  With patient's history of cardiac disease it may be a possibility that he could use any SGLT2 inhibitor.  I would not suggest a GLP-1 agonist due to his current weight.    4. Dyslipidemia   We we will obtain lipid profile.  He is tolerating his lipid-lowering agent at present time.  We will continue to adjust to keep his LDL less than 70.    5. Panlobular emphysema   Patient has been doing well with respect to his emphysema.  We will continue him on the triple therapy.  He has not required any form of short acting.  We have encouraged him to consider initiation of a beta agonist but patient does not wish to pursue at present time.    6. Shortness of breath  We will obtain a BMP as he has had problems with heart failure in the past.  -     proBNP; Future    7. Immunization refused   We have discussed the risk benefits of several vaccines.  Patient does not wish to receive any of these vaccines currently including the shingle, RSV as well as Tdap.  Patient believes he may have had a Tdap vaccine at follow-up in hospital recently.  We will try to obtain these records.             Follow Up   Return in about 6 months (around 10/10/2024).  Patient was given instructions and counseling regarding his condition or for health maintenance advice. Please see specific information pulled into the AVS if appropriate.

## 2024-04-10 NOTE — PATIENT INSTRUCTIONS
Fall Prevention in the Home, Adult  Falls can cause injuries and affect people of all ages. There are many simple things that you can do to make your home safe and to help prevent falls.  If you need it, ask for help making these changes.  What actions can I take to prevent falls?  General information  Use good lighting in all rooms. Make sure to:  Replace any light bulbs that burn out.  Turn on lights if it is dark and use night-lights.  Keep items that you use often in easy-to-reach places. Lower the shelves around your home if needed.  Move furniture so that there are clear paths around it.  Do not keep throw rugs or other things on the floor that can make you trip.  If any of your floors are uneven, fix them.  Add color or contrast paint or tape to clearly virgie and help you see:  Grab bars or handrails.  First and last steps of staircases.  Where the edge of each step is.  If you use a ladder or stepladder:  Make sure that it is fully opened. Do not climb a closed ladder.  Make sure the sides of the ladder are locked in place.  Have someone hold the ladder while you use it.  Know where your pets are as you move through your home.  What can I do in the bathroom?         Keep the floor dry. Clean up any water that is on the floor right away.  Remove soap buildup in the bathtub or shower. Buildup makes bathtubs and showers slippery.  Use non-skid mats or decals on the floor of the bathtub or shower.  Attach bath mats securely with double-sided, non-slip rug tape.  If you need to sit down while you are in the shower, use a non-slip stool.  Install grab bars by the toilet and in the bathtub and shower. Do not use towel bars as grab bars.  What can I do in the bedroom?  Make sure that you have a light by your bed that is easy to reach.  Do not use any sheets or blankets on your bed that hang to the floor.  Have a firm bench or chair with side arms that you can use for support when you get dressed.  What can I do in  the kitchen?  Clean up any spills right away.  If you need to reach something above you, use a sturdy step stool that has a grab bar.  Keep electrical cables out of the way.  Do not use floor polish or wax that makes floors slippery.  What can I do with my stairs?  Do not leave anything on the stairs.  Make sure that you have a light switch at the top and the bottom of the stairs. Have them installed if you do not have them.  Make sure that there are handrails on both sides of the stairs. Fix handrails that are broken or loose. Make sure that handrails are as long as the staircases.  Install non-slip stair treads on all stairs in your home if they do not have carpet.  Avoid having throw rugs at the top or bottom of stairs, or secure the rugs with carpet tape to prevent them from moving.  Choose a carpet design that does not hide the edge of steps on the stairs. Make sure that carpet is firmly attached to the stairs. Fix any carpet that is loose or worn.  What can I do on the outside of my home?  Use bright outdoor lighting.  Repair the edges of walkways and driveways and fix any cracks. Clear paths of anything that can make you trip, such as tools or rocks.  Add color or contrast paint or tape to clearly virgie and help you see high doorway thresholds.  Trim any bushes or trees on the main path into your home.  Check that handrails are securely fastened and in good repair. Both sides of all steps should have handrails.  Install guardrails along the edges of any raised decks or porches.  Have leaves, snow, and ice cleared regularly. Use sand, salt, or ice melt on walkways during winter months if you live where there is ice and snow.  In the garage, clean up any spills right away, including grease or oil spills.  What other actions can I take?  Review your medicines with your health care provider. Some medicines can make you confused or feel dizzy. This can increase your chance of falling.  Wear closed-toe shoes that  fit well and support your feet. Wear shoes that have rubber soles and low heels.  Use a cane, walker, scooter, or crutches that help you move around if needed.  Talk with your provider about other ways that you can decrease your risk of falls. This may include seeing a physical therapist to learn to do exercises to improve movement and strength.  Where to find more information  Centers for Disease Control and Prevention, [place]: cdc.gov  [employer]: alton.nih.gov  [employer]: alton.nih.gov  Contact a health care provider if:  You are afraid of falling at home.  You feel weak, drowsy, or dizzy at home.  You fall at home.  Get help right away if you:  Lose consciousness or have trouble moving after a fall.  Have a fall that causes a head injury.  These symptoms may be an emergency. Get help right away. Call 911.  Do not wait to see if the symptoms will go away.  Do not drive yourself to the hospital.  This information is not intended to replace advice given to you by your health care provider. Make sure you discuss any questions you have with your health care provider.  Document Revised: 08/21/2023 Document Reviewed: 08/21/2023  [employer] Patient Education © 2023 [employer]

## 2024-04-19 ENCOUNTER — TELEPHONE (OUTPATIENT)
Dept: FAMILY MEDICINE CLINIC | Facility: CLINIC | Age: 84
End: 2024-04-19
Payer: MEDICARE

## 2024-05-13 RX ORDER — OMEPRAZOLE 40 MG/1
40 CAPSULE, DELAYED RELEASE ORAL DAILY
Qty: 90 CAPSULE | Refills: 3 | Status: SHIPPED | OUTPATIENT
Start: 2024-05-13

## 2024-06-03 ENCOUNTER — OFFICE VISIT (OUTPATIENT)
Dept: FAMILY MEDICINE CLINIC | Facility: CLINIC | Age: 84
End: 2024-06-03
Payer: MEDICARE

## 2024-06-03 VITALS
SYSTOLIC BLOOD PRESSURE: 120 MMHG | BODY MASS INDEX: 18.47 KG/M2 | OXYGEN SATURATION: 93 % | DIASTOLIC BLOOD PRESSURE: 72 MMHG | TEMPERATURE: 97.7 F | HEART RATE: 50 BPM | WEIGHT: 129 LBS | HEIGHT: 70 IN

## 2024-06-03 DIAGNOSIS — J20.9 ACUTE BRONCHITIS, UNSPECIFIED ORGANISM: Primary | ICD-10-CM

## 2024-06-03 DIAGNOSIS — H10.533 CONTACT BLEPHAROCONJUNCTIVITIS OF BOTH EYES: ICD-10-CM

## 2024-06-03 PROCEDURE — 1126F AMNT PAIN NOTED NONE PRSNT: CPT | Performed by: FAMILY MEDICINE

## 2024-06-03 PROCEDURE — 3078F DIAST BP <80 MM HG: CPT | Performed by: FAMILY MEDICINE

## 2024-06-03 PROCEDURE — G2211 COMPLEX E/M VISIT ADD ON: HCPCS | Performed by: FAMILY MEDICINE

## 2024-06-03 PROCEDURE — 99213 OFFICE O/P EST LOW 20 MIN: CPT | Performed by: FAMILY MEDICINE

## 2024-06-03 PROCEDURE — 3074F SYST BP LT 130 MM HG: CPT | Performed by: FAMILY MEDICINE

## 2024-06-03 PROCEDURE — 1159F MED LIST DOCD IN RCRD: CPT | Performed by: FAMILY MEDICINE

## 2024-06-03 PROCEDURE — 1160F RVW MEDS BY RX/DR IN RCRD: CPT | Performed by: FAMILY MEDICINE

## 2024-06-03 RX ORDER — AMOXICILLIN AND CLAVULANATE POTASSIUM 875; 125 MG/1; MG/1
1 TABLET, FILM COATED ORAL 2 TIMES DAILY
Qty: 20 TABLET | Refills: 0 | Status: SHIPPED | OUTPATIENT
Start: 2024-06-03

## 2024-06-03 RX ORDER — TOBRAMYCIN AND DEXAMETHASONE 3; 1 MG/ML; MG/ML
1 SUSPENSION/ DROPS OPHTHALMIC
Qty: 10 ML | Refills: 0 | Status: SHIPPED | OUTPATIENT
Start: 2024-06-03

## 2024-06-03 RX ORDER — PREDNISONE 10 MG/1
TABLET ORAL
Qty: 36 TABLET | Refills: 0 | Status: SHIPPED | OUTPATIENT
Start: 2024-06-03 | End: 2024-06-10

## 2024-06-03 NOTE — PROGRESS NOTES
Follow Up Office Visit      Date: 2024   Patient Name: Lucio Castañeda  : 1940   MRN: [id number]     Chief Complaint:    Chief Complaint   Patient presents with    Sinusitis     Pt states he was in hay field and his sinuses were bothering him.    Nasal Congestion       History of Present Illness: Lucio Castañeda is a 83 y.o. male who is here today for evaluation of some sinus congestion that he has had over the previous week.  Patient has been cutting hay and has had some problems with allergy symptomatology that has not worsened to where he is having some sinus pressure with thick purulent drainage.  He has having a little bit of a cough that does appear to be nonproductive.  He has not had any problems with his wheezing but has had some problems with some vertigo that has now resolved.  No other issues been noted currently as he does continue to use his other medications as prescribed without side effects.  He has continue with his usual activity, appetite and sleep.  Patient has not had any other cardiovascular, respiratory, gastrointestinal, urologic and neurologic complaints.  He has had some eye irritation.    Subjective      Review of Systems:   Review of Systems   Constitutional:  Negative for activity change, appetite change, fatigue and fever.   HENT:  Positive for congestion, postnasal drip, rhinorrhea and sinus pressure. Negative for ear pain and sore throat.    Eyes:  Positive for discharge, redness and itching. Negative for photophobia, pain and visual disturbance.   Respiratory:  Positive for cough. Negative for chest tightness, shortness of breath and wheezing.    Cardiovascular:  Negative for chest pain, palpitations and leg swelling.   Gastrointestinal:  Negative for abdominal distention, abdominal pain, blood in stool, constipation, diarrhea, nausea, vomiting, GERD and indigestion.   Genitourinary:  Negative for dysuria, flank pain, frequency and urgency.   Neurological:  Positive  for dizziness. Negative for tremors, seizures, syncope, weakness, light-headedness, numbness, headache and memory problem.   Psychiatric/Behavioral:  Negative for sleep disturbance.        I have reviewed the patients family history, social history, past medical history, past surgical history and have updated it as appropriate.     Medications:     Current Outpatient Medications:     amLODIPine (NORVASC) 10 MG tablet, TAKE ONE TABLET BY MOUTH EVERY DAY, Disp: 90 tablet, Rfl: 3    aspirin 81 MG tablet, Take 1 tablet by mouth Daily., Disp: , Rfl:     carvedilol (COREG) 12.5 MG tablet, TAKE 1 TABLET BY MOUTH 2 (TWO) TIMES A DAY WITH MEALS., Disp: 180 tablet, Rfl: 0    clopidogrel (PLAVIX) 75 MG tablet, TAKE ONE TABLET BY MOUTH EVERY DAY *NEEDS APPT*, Disp: 90 tablet, Rfl: 0    omeprazole (priLOSEC) 40 MG capsule, TAKE ONE CAPSULE BY MOUTH EVERY DAY, Disp: 90 capsule, Rfl: 3    simvastatin (ZOCOR) 40 MG tablet, TAKE 1 TABLET BY MOUTH DAILY, Disp: 90 tablet, Rfl: 3    Trelegy Ellipta 200-62.5-25 MCG/ACT aerosol powder , INHALE 1 PUFF ONCE DAILY **RINSE MOUTH AFTER EACH USE**, Disp: 60 each, Rfl: 11    amoxicillin-clavulanate (AUGMENTIN) 875-125 MG per tablet, Take 1 tablet by mouth 2 (Two) Times a Day., Disp: 20 tablet, Rfl: 0    predniSONE (DELTASONE) 10 MG tablet, Take 8 tablets by mouth Daily for 1 day, THEN 7 tablets Daily for 1 day, THEN 6 tablets Daily for 1 day, THEN 5 tablets Daily for 1 day, THEN 4 tablets Daily for 1 day, THEN 3 tablets Daily for 1 day, THEN 2 tablets Daily for 1 day, THEN 1 tablet Daily for 1 day., Disp: 36 tablet, Rfl: 0    tobramycin-dexAMETHasone (TobraDex) 0.3-0.1 % ophthalmic suspension, Administer 1 drop to both eyes Every 4 (Four) Hours While Awake., Disp: 10 mL, Rfl: 0    Allergies:   Allergies   Allergen Reactions    Morphine Hallucinations       Immunizations:   Immunization History   Administered Date(s) Administered    COVID-19 (MODERNA) 1st,2nd,3rd Dose Monovalent 03/16/2021,  "04/14/2021, 11/16/2021    FLUAD TRI 65YR+ 11/15/2019    Flu Vaccine Quad PF >36MO 11/17/2020    Fluad Quad 65+ 11/17/2020    Fluzone (or Fluarix & Flulaval for VFC) >6mos 11/17/2020    Fluzone High-Dose 65+yrs 11/13/2023    Fluzone Quad >6mos (Multi-dose) 08/24/2016    Influenza Inj MDCK Preserative Free 08/24/2016    Influenza, Unspecified 11/16/2018    Pneumococcal Conjugate 13-Valent (PCV13) 08/24/2016    Pneumococcal Polysaccharide (PPSV23) 12/11/2006        Objective     Physical Exam: Please see above  Vital Signs:   Vitals:    06/03/24 1518   BP: 120/72   Pulse: 50   Temp: 97.7 °F (36.5 °C)   SpO2: 93%   Weight: 58.5 kg (129 lb)   Height: 177.8 cm (70\")     Body mass index is 18.51 kg/m².  BMI is within normal parameters. No other follow-up for BMI required.       Physical Exam  Vitals and nursing note reviewed.   Constitutional:       Appearance: Normal appearance.   HENT:      Head: Normocephalic and atraumatic.      Nose: Nose normal.      Mouth/Throat:      Pharynx: Oropharynx is clear.   Eyes:      Extraocular Movements: Extraocular movements intact.      Pupils: Pupils are equal, round, and reactive to light.   Neck:      Thyroid: No thyroid mass or thyromegaly.      Trachea: Trachea normal.   Cardiovascular:      Rate and Rhythm: Normal rate and regular rhythm.      Pulses: Normal pulses. No decreased pulses.      Heart sounds: Normal heart sounds.   Pulmonary:      Effort: Pulmonary effort is normal.      Breath sounds: Normal breath sounds.   Abdominal:      General: Abdomen is flat. Bowel sounds are normal.      Palpations: Abdomen is soft.      Tenderness: There is no abdominal tenderness.   Musculoskeletal:      Cervical back: Neck supple.      Right lower leg: No edema.      Left lower leg: No edema.   Lymphadenopathy:      Cervical: No cervical adenopathy.   Skin:     General: Skin is warm and dry.   Neurological:      General: No focal deficit present.      Mental Status: He is alert and " oriented to person, place, and time.      Sensory: Sensation is intact.      Motor: Motor function is intact.      Coordination: Coordination is intact.   Psychiatric:         Attention and Perception: Attention normal.         Mood and Affect: Mood normal.         Speech: Speech normal.         Behavior: Behavior normal.         Procedures    Results:   Labs:   Hemoglobin A1C   Date Value Ref Range Status   04/10/2024 6.00 (H) 4.80 - 5.60 % Final     TSH   Date Value Ref Range Status   04/10/2024 1.350 0.270 - 4.200 uIU/mL Final        POCT Results (if applicable):   Results for orders placed or performed in visit on 04/10/24   CBC (No Diff)    Specimen: Arm, Right; Blood   Result Value Ref Range    WBC 9.40 3.40 - 10.80 10*3/mm3    RBC 4.77 4.14 - 5.80 10*6/mm3    Hemoglobin 13.9 13.0 - 17.7 g/dL    Hematocrit 42.1 37.5 - 51.0 %    MCV 88.3 79.0 - 97.0 fL    MCH 29.1 26.6 - 33.0 pg    MCHC 33.0 31.5 - 35.7 g/dL    RDW 12.5 12.3 - 15.4 %    RDW-SD 40.5 37.0 - 54.0 fl    MPV 9.6 6.0 - 12.0 fL    Platelets 252 140 - 450 10*3/mm3   Comprehensive Metabolic Panel    Specimen: Arm, Right; Blood   Result Value Ref Range    Glucose 117 (H) 65 - 99 mg/dL    BUN 23 8 - 23 mg/dL    Creatinine 1.16 0.76 - 1.27 mg/dL    Sodium 141 136 - 145 mmol/L    Potassium 3.6 3.5 - 5.2 mmol/L    Chloride 104 98 - 107 mmol/L    CO2 24.6 22.0 - 29.0 mmol/L    Calcium 9.3 8.6 - 10.5 mg/dL    Total Protein 7.1 6.0 - 8.5 g/dL    Albumin 4.1 3.5 - 5.2 g/dL    ALT (SGPT) 5 1 - 41 U/L    AST (SGOT) 11 1 - 40 U/L    Alkaline Phosphatase 52 39 - 117 U/L    Total Bilirubin 0.3 0.0 - 1.2 mg/dL    Globulin 3.0 gm/dL    A/G Ratio 1.4 g/dL    BUN/Creatinine Ratio 19.8 7.0 - 25.0    Anion Gap 12.4 5.0 - 15.0 mmol/L    eGFR 62.9 >60.0 mL/min/1.73   Hemoglobin A1c    Specimen: Arm, Right; Blood   Result Value Ref Range    Hemoglobin A1C 6.00 (H) 4.80 - 5.60 %   Lipid Panel    Specimen: Arm, Right; Blood   Result Value Ref Range    Total Cholesterol 111 0 -  200 mg/dL    Triglycerides 70 0 - 150 mg/dL    HDL Cholesterol 37 (L) 40 - 60 mg/dL    LDL Cholesterol  59 0 - 100 mg/dL    VLDL Cholesterol 15 5 - 40 mg/dL    LDL/HDL Ratio 1.62    TSH Rfx On Abnormal To Free T4    Specimen: Arm, Right; Blood   Result Value Ref Range    TSH 1.350 0.270 - 4.200 uIU/mL   proBNP    Specimen: Arm, Right; Blood   Result Value Ref Range    proBNP 1,451.0 0.0 - 1,800.0 pg/mL       Imaging:   No valid procedures specified.     Measures:   Advanced Care Planning:   Patient has an advance directive in EMR which is still valid.     Smoking Cessation:   Non-smoker.    Assessment / Plan      Assessment/Plan:   Diagnoses and all orders for this visit:    1. Acute bronchitis, unspecified organism (Primary)  Patient may have initially had symptoms of allergic rhinitis and does appear that things have gradually worsened and that it may have evolved into acute sinusitis/acute bronchitis.  We have discussed options and will use a course of Augmentin as well as prednisone.  We will continue to monitor his symptoms and if he has other problems he will let us know.  He will drink lots of fluids and use nasal saline as necessary.      predniSONE (DELTASONE) 10 MG tablet; Take 8 tablets by mouth Daily for 1 day, THEN 7 tablets Daily for 1 day, THEN 6 tablets Daily for 1 day, THEN 5 tablets Daily for 1 day, THEN 4 tablets Daily for 1 day, THEN 3 tablets Daily for 1 day, THEN 2 tablets Daily for 1 day, THEN 1 tablet Daily for 1 day.  Dispense: 36 tablet; Refill: 0  -     amoxicillin-clavulanate (AUGMENTIN) 875-125 MG per tablet; Take 1 tablet by mouth 2 (Two) Times a Day.  Dispense: 20 tablet; Refill: 0    2. Contact blepharoconjunctivitis of both eyes  Patient does have bilateral eye irritation that does appear to be more allergic in nature.  We will try some topical steroids as the over-the-counter drops have not been beneficial.  He understands that he should only use his for up to 3 to 4 days and then  he must discontinue.  He will monitor closely and if he has other problems or plans further assessment treatment will be necessary.  -     tobramycin-dexAMETHasone (TobraDex) 0.3-0.1 % ophthalmic suspension; Administer 1 drop to both eyes Every 4 (Four) Hours While Awake.  Dispense: 10 mL; Refill: 0        Follow Up:   Return in about 4 months (around 10/10/2024).      At [facility], we believe that sharing information builds trust and better relationships. You are receiving this note because you recently visited [facility]. It is possible you will see health information before a provider has talked with you about it. This kind of information can be easy to misunderstand. To help you fully understand what it means for your health, we urge you to discuss this note with your provider.    Rocky Morel MD  [facility]

## 2024-06-13 RX ORDER — FLUTICASONE FUROATE, UMECLIDINIUM BROMIDE AND VILANTEROL TRIFENATATE 200; 62.5; 25 UG/1; UG/1; UG/1
POWDER RESPIRATORY (INHALATION)
Qty: 60 EACH | Refills: 11 | Status: SHIPPED | OUTPATIENT
Start: 2024-06-13

## 2024-06-26 RX ORDER — CARVEDILOL 12.5 MG/1
12.5 TABLET ORAL 2 TIMES DAILY WITH MEALS
Qty: 180 TABLET | Refills: 0 | Status: SHIPPED | OUTPATIENT
Start: 2024-06-26

## 2024-07-01 ENCOUNTER — OFFICE VISIT (OUTPATIENT)
Dept: FAMILY MEDICINE CLINIC | Facility: CLINIC | Age: 84
End: 2024-07-01
Payer: MEDICARE

## 2024-07-01 VITALS
HEART RATE: 57 BPM | SYSTOLIC BLOOD PRESSURE: 122 MMHG | HEIGHT: 70 IN | DIASTOLIC BLOOD PRESSURE: 70 MMHG | OXYGEN SATURATION: 97 % | BODY MASS INDEX: 18.04 KG/M2 | RESPIRATION RATE: 18 BRPM | WEIGHT: 126 LBS | TEMPERATURE: 98 F

## 2024-07-01 DIAGNOSIS — M79.671 FOOT PAIN, RIGHT: Primary | ICD-10-CM

## 2024-07-01 PROCEDURE — 3074F SYST BP LT 130 MM HG: CPT | Performed by: PHYSICIAN ASSISTANT

## 2024-07-01 PROCEDURE — 1126F AMNT PAIN NOTED NONE PRSNT: CPT | Performed by: PHYSICIAN ASSISTANT

## 2024-07-01 PROCEDURE — 99213 OFFICE O/P EST LOW 20 MIN: CPT | Performed by: PHYSICIAN ASSISTANT

## 2024-07-01 PROCEDURE — 3078F DIAST BP <80 MM HG: CPT | Performed by: PHYSICIAN ASSISTANT

## 2024-07-01 NOTE — PROGRESS NOTES
Follow Up Office Visit      Date: 2024   Patient Name: Lucio Castañeda  : 1940   MRN: [id number]     Chief Complaint:    Chief Complaint   Patient presents with    Foot Swelling     Started last week unsure what happened     Ankle Injury       History of Present Illness: Lucio Castañeda is a 83 y.o. male who is here today for midfoot pain and ankle selling that has persisted for several days.  He states that he can recall no injury but did have to respond to a wife emergently during a fall and seizure last week.  He does not recall an injury during that event but has had persistent discomfort since that time.  He has had no other constitutional symptoms. .    Subjective      Review of Systems:   Review of Systems   Constitutional: Negative.    HENT: Negative.     Respiratory: Negative.     Cardiovascular: Negative.    Musculoskeletal:  Positive for back pain, gait problem and joint swelling (right ankle and fore foot). Negative for arthralgias.       I have reviewed the patients family history, social history, past medical history, past surgical history and have updated it as appropriate.     Medications:     Current Outpatient Medications:     amLODIPine (NORVASC) 10 MG tablet, TAKE ONE TABLET BY MOUTH EVERY DAY, Disp: 90 tablet, Rfl: 3    amoxicillin-clavulanate (AUGMENTIN) 875-125 MG per tablet, Take 1 tablet by mouth 2 (Two) Times a Day., Disp: 20 tablet, Rfl: 0    aspirin 81 MG tablet, Take 1 tablet by mouth Daily., Disp: , Rfl:     carvedilol (COREG) 12.5 MG tablet, TAKE 1 TABLET BY MOUTH 2 (TWO) TIMES A DAY WITH MEALS., Disp: 180 tablet, Rfl: 0    clopidogrel (PLAVIX) 75 MG tablet, TAKE ONE TABLET BY MOUTH EVERY DAY *NEEDS APPT*, Disp: 90 tablet, Rfl: 0    omeprazole (priLOSEC) 40 MG capsule, TAKE ONE CAPSULE BY MOUTH EVERY DAY, Disp: 90 capsule, Rfl: 3    simvastatin (ZOCOR) 40 MG tablet, TAKE 1 TABLET BY MOUTH DAILY, Disp: 90 tablet, Rfl: 3    tobramycin-dexAMETHasone (TobraDex) 0.3-0.1 %  "ophthalmic suspension, Administer 1 drop to both eyes Every 4 (Four) Hours While Awake., Disp: 10 mL, Rfl: 0    Trelegy Ellipta 200-62.5-25 MCG/ACT inhaler, INHALE 1 PUFF BY MOUTH DAILY RINSE MOUTH AFTER EACH USE, Disp: 60 each, Rfl: 11    Allergies:   Allergies   Allergen Reactions    Morphine Hallucinations       Objective     Physical Exam: Please see above  Vital Signs:   Vitals:    07/01/24 1108   BP: 122/70   BP Location: Right arm   Patient Position: Sitting   Cuff Size: Adult   Pulse: 57   Resp: 18   Temp: 98 °F (36.7 °C)   TempSrc: Temporal   SpO2: 97%   Weight: 57.2 kg (126 lb)   Height: 177.8 cm (70\")     Body mass index is 18.08 kg/m².    Physical Exam  Vitals and nursing note reviewed.   Constitutional:       General: He is not in acute distress.     Appearance: Normal appearance. He is not ill-appearing or toxic-appearing.   Cardiovascular:      Rate and Rhythm: Normal rate and regular rhythm.      Heart sounds: No murmur heard.     No friction rub. No gallop.   Pulmonary:      Effort: Pulmonary effort is normal. No respiratory distress.      Breath sounds: Normal breath sounds. No wheezing or rales.   Musculoskeletal:      Right foot: Decreased range of motion (pain on torsion of mid foot). No deformity.        Feet:    Feet:      Right foot:      Skin integrity: No ulcer, blister, erythema, warmth, callus or dry skin.      Comments: Mild non pitting swelling of ankle, no calf tenderness.  Neurological:      Mental Status: He is alert.       Procedures    Assessment / Plan      Assessment/Plan:   1. Foot pain, right  We will r/o fracture and follow  - XR Foot 3+ View Right; Future       Follow Up:   No follow-ups on file.      At [facility], we believe that sharing information builds trust and better relationships. You are receiving this note because you recently visited [facility]. It is possible you will see health information before a provider has talked with you about it. This kind of " information can be easy to misunderstand. To help you fully understand what it means for your health, we urge you to discuss this note with your provider.    Yari Morel PA-C  [facility], [place]

## 2024-07-02 ENCOUNTER — TELEPHONE (OUTPATIENT)
Dept: FAMILY MEDICINE CLINIC | Facility: CLINIC | Age: 84
End: 2024-07-02
Payer: MEDICARE

## 2024-07-02 NOTE — TELEPHONE ENCOUNTER
----- Message from Yari Morel sent at 7/1/2024  5:01 PM EDT -----  Please notify patient no sign of fracture, this is most likely a sprain.  I would wear supportive shoes or an ace bandage and follow up if no improvement in two weeks  ----- Message -----  From: Interface, [facility] [facility]  Sent: 7/1/2024   4:27 PM EDT  To: Yari Morel PA-C

## 2024-07-04 ENCOUNTER — APPOINTMENT (OUTPATIENT)
Dept: GENERAL RADIOLOGY | Facility: HOSPITAL | Age: 84
End: 2024-07-04
Payer: MEDICARE

## 2024-07-04 ENCOUNTER — HOSPITAL ENCOUNTER (EMERGENCY)
Facility: HOSPITAL | Age: 84
Discharge: HOME | End: 2024-07-04
Attending: STUDENT IN AN ORGANIZED HEALTH CARE EDUCATION/TRAINING PROGRAM
Payer: MEDICARE

## 2024-07-04 ENCOUNTER — APPOINTMENT (OUTPATIENT)
Dept: CT IMAGING | Facility: HOSPITAL | Age: 84
End: 2024-07-04
Payer: MEDICARE

## 2024-07-04 VITALS
OXYGEN SATURATION: 93 % | RESPIRATION RATE: 18 BRPM | WEIGHT: 124.8 LBS | DIASTOLIC BLOOD PRESSURE: 81 MMHG | HEART RATE: 63 BPM | BODY MASS INDEX: 17.87 KG/M2 | HEIGHT: 70 IN | TEMPERATURE: 97.3 F | SYSTOLIC BLOOD PRESSURE: 145 MMHG

## 2024-07-04 DIAGNOSIS — R91.1 LUNG NODULE: ICD-10-CM

## 2024-07-04 DIAGNOSIS — N17.9 AKI (ACUTE KIDNEY INJURY): ICD-10-CM

## 2024-07-04 DIAGNOSIS — J18.9 PNEUMONIA OF BOTH LUNGS DUE TO INFECTIOUS ORGANISM, UNSPECIFIED PART OF LUNG: Primary | ICD-10-CM

## 2024-07-04 LAB
ALBUMIN SERPL-MCNC: 3.4 G/DL (ref 3.5–5.2)
ALBUMIN/GLOB SERPL: 0.9 G/DL
ALP SERPL-CCNC: 33 U/L (ref 39–117)
ALT SERPL W P-5'-P-CCNC: 5 U/L (ref 1–41)
ANION GAP SERPL CALCULATED.3IONS-SCNC: 13.1 MMOL/L (ref 5–15)
AST SERPL-CCNC: 13 U/L (ref 1–40)
B PARAPERT DNA SPEC QL NAA+PROBE: NOT DETECTED
B PERT DNA SPEC QL NAA+PROBE: NOT DETECTED
BASOPHILS # BLD AUTO: 0.06 10*3/MM3 (ref 0–0.2)
BASOPHILS NFR BLD AUTO: 0.4 % (ref 0–1.5)
BILIRUB SERPL-MCNC: 0.8 MG/DL (ref 0–1.2)
BUN SERPL-MCNC: 26 MG/DL (ref 8–23)
BUN/CREAT SERPL: 17.2 (ref 7–25)
C PNEUM DNA NPH QL NAA+NON-PROBE: NOT DETECTED
CALCIUM SPEC-SCNC: 8.8 MG/DL (ref 8.6–10.5)
CHLORIDE SERPL-SCNC: 102 MMOL/L (ref 98–107)
CO2 SERPL-SCNC: 20.9 MMOL/L (ref 22–29)
CREAT SERPL-MCNC: 1.51 MG/DL (ref 0.76–1.27)
DEPRECATED RDW RBC AUTO: 41.3 FL (ref 37–54)
EGFRCR SERPLBLD CKD-EPI 2021: 45.8 ML/MIN/1.73
EOSINOPHIL # BLD AUTO: 0.13 10*3/MM3 (ref 0–0.4)
EOSINOPHIL NFR BLD AUTO: 0.8 % (ref 0.3–6.2)
ERYTHROCYTE [DISTWIDTH] IN BLOOD BY AUTOMATED COUNT: 13.2 % (ref 12.3–15.4)
FLUAV SUBTYP SPEC NAA+PROBE: NOT DETECTED
FLUBV RNA ISLT QL NAA+PROBE: NOT DETECTED
GLOBULIN UR ELPH-MCNC: 3.6 GM/DL
GLUCOSE SERPL-MCNC: 201 MG/DL (ref 65–99)
HADV DNA SPEC NAA+PROBE: NOT DETECTED
HCOV 229E RNA SPEC QL NAA+PROBE: NOT DETECTED
HCOV HKU1 RNA SPEC QL NAA+PROBE: NOT DETECTED
HCOV NL63 RNA SPEC QL NAA+PROBE: NOT DETECTED
HCOV OC43 RNA SPEC QL NAA+PROBE: NOT DETECTED
HCT VFR BLD AUTO: 36.1 % (ref 37.5–51)
HGB BLD-MCNC: 12.3 G/DL (ref 13–17.7)
HMPV RNA NPH QL NAA+NON-PROBE: NOT DETECTED
HOLD SPECIMEN: NORMAL
HOLD SPECIMEN: NORMAL
HPIV1 RNA ISLT QL NAA+PROBE: NOT DETECTED
HPIV2 RNA SPEC QL NAA+PROBE: NOT DETECTED
HPIV3 RNA NPH QL NAA+PROBE: NOT DETECTED
HPIV4 P GENE NPH QL NAA+PROBE: NOT DETECTED
IMM GRANULOCYTES # BLD AUTO: 0.11 10*3/MM3 (ref 0–0.05)
IMM GRANULOCYTES NFR BLD AUTO: 0.7 % (ref 0–0.5)
LYMPHOCYTES # BLD AUTO: 1.32 10*3/MM3 (ref 0.7–3.1)
LYMPHOCYTES NFR BLD AUTO: 8 % (ref 19.6–45.3)
M PNEUMO IGG SER IA-ACNC: NOT DETECTED
MCH RBC QN AUTO: 29.3 PG (ref 26.6–33)
MCHC RBC AUTO-ENTMCNC: 34.1 G/DL (ref 31.5–35.7)
MCV RBC AUTO: 86 FL (ref 79–97)
MONOCYTES # BLD AUTO: 0.77 10*3/MM3 (ref 0.1–0.9)
MONOCYTES NFR BLD AUTO: 4.7 % (ref 5–12)
NEUTROPHILS NFR BLD AUTO: 14.01 10*3/MM3 (ref 1.7–7)
NEUTROPHILS NFR BLD AUTO: 85.4 % (ref 42.7–76)
NRBC BLD AUTO-RTO: 0 /100 WBC (ref 0–0.2)
NT-PROBNP SERPL-MCNC: 4753 PG/ML (ref 0–1800)
PLATELET # BLD AUTO: 219 10*3/MM3 (ref 140–450)
PMV BLD AUTO: 9.3 FL (ref 6–12)
POTASSIUM SERPL-SCNC: 3.7 MMOL/L (ref 3.5–5.2)
PROT SERPL-MCNC: 7 G/DL (ref 6–8.5)
RBC # BLD AUTO: 4.2 10*6/MM3 (ref 4.14–5.8)
RHINOVIRUS RNA SPEC NAA+PROBE: NOT DETECTED
RSV RNA NPH QL NAA+NON-PROBE: NOT DETECTED
SARS-COV-2 RNA NPH QL NAA+NON-PROBE: NOT DETECTED
SODIUM SERPL-SCNC: 136 MMOL/L (ref 136–145)
TROPONIN T SERPL HS-MCNC: 10 NG/L
WBC NRBC COR # BLD AUTO: 16.4 10*3/MM3 (ref 3.4–10.8)
WHOLE BLOOD HOLD COAG: NORMAL
WHOLE BLOOD HOLD SPECIMEN: NORMAL

## 2024-07-04 PROCEDURE — 99284 EMERGENCY DEPT VISIT MOD MDM: CPT

## 2024-07-04 PROCEDURE — 71045 X-RAY EXAM CHEST 1 VIEW: CPT

## 2024-07-04 PROCEDURE — 84484 ASSAY OF TROPONIN QUANT: CPT | Performed by: STUDENT IN AN ORGANIZED HEALTH CARE EDUCATION/TRAINING PROGRAM

## 2024-07-04 PROCEDURE — 83880 ASSAY OF NATRIURETIC PEPTIDE: CPT | Performed by: STUDENT IN AN ORGANIZED HEALTH CARE EDUCATION/TRAINING PROGRAM

## 2024-07-04 PROCEDURE — 71250 CT THORAX DX C-: CPT

## 2024-07-04 PROCEDURE — 0202U NFCT DS 22 TRGT SARS-COV-2: CPT | Performed by: STUDENT IN AN ORGANIZED HEALTH CARE EDUCATION/TRAINING PROGRAM

## 2024-07-04 PROCEDURE — 93005 ELECTROCARDIOGRAM TRACING: CPT | Performed by: STUDENT IN AN ORGANIZED HEALTH CARE EDUCATION/TRAINING PROGRAM

## 2024-07-04 PROCEDURE — 80053 COMPREHEN METABOLIC PANEL: CPT | Performed by: STUDENT IN AN ORGANIZED HEALTH CARE EDUCATION/TRAINING PROGRAM

## 2024-07-04 PROCEDURE — 85025 COMPLETE CBC W/AUTO DIFF WBC: CPT | Performed by: STUDENT IN AN ORGANIZED HEALTH CARE EDUCATION/TRAINING PROGRAM

## 2024-07-04 RX ORDER — AMOXICILLIN AND CLAVULANATE POTASSIUM 875; 125 MG/1; MG/1
1 TABLET, FILM COATED ORAL 2 TIMES DAILY
Qty: 14 TABLET | Refills: 0 | Status: SHIPPED | OUTPATIENT
Start: 2024-07-04 | End: 2024-07-11

## 2024-07-04 RX ORDER — DOXYCYCLINE 100 MG/1
100 CAPSULE ORAL 2 TIMES DAILY
Qty: 14 CAPSULE | Refills: 0 | Status: SHIPPED | OUTPATIENT
Start: 2024-07-04 | End: 2024-07-11

## 2024-07-04 RX ORDER — DOXYCYCLINE 100 MG/1
100 CAPSULE ORAL ONCE
Status: COMPLETED | OUTPATIENT
Start: 2024-07-04 | End: 2024-07-04

## 2024-07-04 RX ORDER — DOXYCYCLINE 100 MG/1
100 CAPSULE ORAL 2 TIMES DAILY
Qty: 14 CAPSULE | Refills: 0 | Status: SHIPPED | OUTPATIENT
Start: 2024-07-04 | End: 2024-07-04

## 2024-07-04 RX ORDER — SODIUM CHLORIDE 0.9 % (FLUSH) 0.9 %
10 SYRINGE (ML) INJECTION AS NEEDED
Status: DISCONTINUED | OUTPATIENT
Start: 2024-07-04 | End: 2024-07-04 | Stop reason: HOSPADM

## 2024-07-04 RX ORDER — AMOXICILLIN AND CLAVULANATE POTASSIUM 875; 125 MG/1; MG/1
1 TABLET, FILM COATED ORAL 2 TIMES DAILY
Qty: 14 TABLET | Refills: 0 | Status: SHIPPED | OUTPATIENT
Start: 2024-07-04 | End: 2024-07-04

## 2024-07-04 RX ORDER — AMOXICILLIN AND CLAVULANATE POTASSIUM 875; 125 MG/1; MG/1
1 TABLET, FILM COATED ORAL ONCE
Status: COMPLETED | OUTPATIENT
Start: 2024-07-04 | End: 2024-07-04

## 2024-07-04 RX ADMIN — AMOXICILLIN AND CLAVULANATE POTASSIUM 1 TABLET: 875; 125 TABLET, FILM COATED ORAL at 13:47

## 2024-07-04 RX ADMIN — DOXYCYCLINE 100 MG: 100 CAPSULE ORAL at 13:47

## 2024-07-04 NOTE — ED PROVIDER NOTES
[facility] EMERGENCY DEPARTMENT  Emergency Department Encounter  Emergency Medicine Physician Note       Pt Name: Lucio Castañeda  MRN: [id number]  Pt :   1940  Room Number:    Date of encounter:  2024  PCP: Rocky Morel MD  ED Provider: Aki Tena MD    Historian: Patient, daughter      HPI:  Chief Complaint: Cough, shortness of breath        Context: Lucio Castañeda is a 83 y.o. male who presents to the ED for cough, shortness of breath.  Symptom onset for the past several days.  He reports a productive cough.  He has fevers/chills.  Denies any chest pain.  Denies leg swelling.  Reports a history of COPD he takes Trelegy and reports compliance with this medication.  States he does not use a rescue inhaler. He notes his PO intake has been decreased due to feeling ill and fatigued. No known infectious exposures.      PAST MEDICAL HISTORY  Past Medical History:   Diagnosis Date    Acid reflux     Acute bronchitis     Allergic rhinitis     Amaurosis fugax, left eye     Anemia     CAD (coronary artery disease) 2019    NUCLEAR GXT EF 43% INF HYPOKINESIS (FIXED DEFECT)    Carotid stenosis, symptomatic w/o infarct, left 2020    left sided amaurosis fugax    Color change in pigmented skin lesion     COPD exacerbation     COPD, moderate     Diverticulosis     Dysuria     Eustachian tube dysfunction     Ganglion, tendon sheath     Gastroenteritis, acute     GERD (gastroesophageal reflux disease)     Heart failure     Hematuria     Hypercholesterolemia     Hyperlipidemia     Hypertension     IHD (ischemic heart disease)     Left low back pain     Malignant hypertension     Mixed dyslipidemia     Osteoarthrosis, generalized, involving multiple sites     Pneumococcal vaccination declined     Pneumonia     Right shoulder pain     Sinusitis, acute     Skin cancer, basal cell     Tympanic membrane perforation     UTI (urinary tract infection)          PAST SURGICAL  HISTORY  Past Surgical History:   Procedure Laterality Date    BLADDER TUMOR EXCISION      CAROTID STENT N/A 2020    Procedure: Carotid Stent;  Surgeon: Edu Montilla MD;  Location: [facility] INVASIVE LOCATION;  Service: Interventional Radiology;  Laterality: N/A;    COLON SURGERY      COLONOSCOPY      NORMAL    COLOSTOMY      W/ REPAIR S/P PERFORATED DIVERTICULI    CORONARY ANGIOPLASTY  2015    S/P STENT X3 EF 50% INFEROBASILAR HYPOKINESIS S/P STENT 2015         FAMILY HISTORY  Family History   Problem Relation Age of Onset    Hypertension Mother     Cancer Father     Hyperlipidemia Other          SOCIAL HISTORY  Social History     Socioeconomic History    Marital status:    Tobacco Use    Smoking status: Former     Current packs/day: 0.00     Average packs/day: 1 pack/day for 28.5 years (28.5 ttl pk-yrs)     Types: Cigarettes     Start date:      Quit date: 7/10/1997     Years since quittin.0     Passive exposure: Past    Smokeless tobacco: Current     Types: Snuff, Chew   Vaping Use    Vaping status: Never Used   Substance and Sexual Activity    Alcohol use: No    Drug use: No    Sexual activity: Defer     Partners: Female         ALLERGIES  Morphine        REVIEW OF SYSTEMS  Systems reviewed and negative      PHYSICAL EXAM    I have reviewed the triage vital signs and nursing notes.    ED Triage Vitals [24 1058]   Temp Heart Rate Resp BP SpO2   97.3 °F (36.3 °C) 52 16 124/62 91 %      Temp src Heart Rate Source Patient Position BP Location FiO2 (%)   Oral Monitor Sitting Left arm --       Physical Exam  Constitutional:       General: He is not in acute distress.  HENT:      Head: Normocephalic.   Eyes:      Extraocular Movements: Extraocular movements intact.   Cardiovascular:      Rate and Rhythm: Normal rate and regular rhythm.      Pulses: Normal pulses.   Pulmonary:      Effort: Pulmonary effort is normal. No respiratory distress.      Breath sounds: Rhonchi present.    Abdominal:      General: There is no distension.      Palpations: Abdomen is soft.      Tenderness: There is no abdominal tenderness.   Musculoskeletal:      Cervical back: Neck supple.      Right lower leg: No edema.      Left lower leg: No edema.   Skin:     General: Skin is warm.   Neurological:      General: No focal deficit present.      Mental Status: He is alert.         LAB RESULTS  Recent Results (from the past 24 hour(s))   Comprehensive Metabolic Panel    Collection Time: 07/04/24 11:08 AM    Specimen: Blood   Result Value Ref Range    Glucose 201 (H) 65 - 99 mg/dL    BUN 26 (H) 8 - 23 mg/dL    Creatinine 1.51 (H) 0.76 - 1.27 mg/dL    Sodium 136 136 - 145 mmol/L    Potassium 3.7 3.5 - 5.2 mmol/L    Chloride 102 98 - 107 mmol/L    CO2 20.9 (L) 22.0 - 29.0 mmol/L    Calcium 8.8 8.6 - 10.5 mg/dL    Total Protein 7.0 6.0 - 8.5 g/dL    Albumin 3.4 (L) 3.5 - 5.2 g/dL    ALT (SGPT) 5 1 - 41 U/L    AST (SGOT) 13 1 - 40 U/L    Alkaline Phosphatase 33 (L) 39 - 117 U/L    Total Bilirubin 0.8 0.0 - 1.2 mg/dL    Globulin 3.6 gm/dL    A/G Ratio 0.9 g/dL    BUN/Creatinine Ratio 17.2 7.0 - 25.0    Anion Gap 13.1 5.0 - 15.0 mmol/L    eGFR 45.8 (L) >60.0 mL/min/1.73   BNP    Collection Time: 07/04/24 11:08 AM    Specimen: Blood   Result Value Ref Range    proBNP 4,753.0 (H) 0.0 - 1,800.0 pg/mL   Single High Sensitivity Troponin T    Collection Time: 07/04/24 11:08 AM    Specimen: Blood   Result Value Ref Range    HS Troponin T 10 <22 ng/L   Green Top (Gel)    Collection Time: 07/04/24 11:08 AM   Result Value Ref Range    Extra Tube Hold for add-ons.    Lavender Top    Collection Time: 07/04/24 11:08 AM   Result Value Ref Range    Extra Tube hold for add-on    Gold Top - SST    Collection Time: 07/04/24 11:08 AM   Result Value Ref Range    Extra Tube Hold for add-ons.    Light Blue Top    Collection Time: 07/04/24 11:08 AM   Result Value Ref Range    Extra Tube Hold for add-ons.    CBC Auto Differential    Collection  Time: 07/04/24 11:08 AM    Specimen: Blood   Result Value Ref Range    WBC 16.40 (H) 3.40 - 10.80 10*3/mm3    RBC 4.20 4.14 - 5.80 10*6/mm3    Hemoglobin 12.3 (L) 13.0 - 17.7 g/dL    Hematocrit 36.1 (L) 37.5 - 51.0 %    MCV 86.0 79.0 - 97.0 fL    MCH 29.3 26.6 - 33.0 pg    MCHC 34.1 31.5 - 35.7 g/dL    RDW 13.2 12.3 - 15.4 %    RDW-SD 41.3 37.0 - 54.0 fl    MPV 9.3 6.0 - 12.0 fL    Platelets 219 140 - 450 10*3/mm3    Neutrophil % 85.4 (H) 42.7 - 76.0 %    Lymphocyte % 8.0 (L) 19.6 - 45.3 %    Monocyte % 4.7 (L) 5.0 - 12.0 %    Eosinophil % 0.8 0.3 - 6.2 %    Basophil % 0.4 0.0 - 1.5 %    Immature Grans % 0.7 (H) 0.0 - 0.5 %    Neutrophils, Absolute 14.01 (H) 1.70 - 7.00 10*3/mm3    Lymphocytes, Absolute 1.32 0.70 - 3.10 10*3/mm3    Monocytes, Absolute 0.77 0.10 - 0.90 10*3/mm3    Eosinophils, Absolute 0.13 0.00 - 0.40 10*3/mm3    Basophils, Absolute 0.06 0.00 - 0.20 10*3/mm3    Immature Grans, Absolute 0.11 (H) 0.00 - 0.05 10*3/mm3    nRBC 0.0 0.0 - 0.2 /100 WBC   Respiratory Panel PCR w/COVID-19(SARS-CoV-2) NANCY/JUSTINA/MABEL/PAD/COR/MARLEEN In-House, NP Swab in [email or web address], 2 HR TAT - Swab, Nasopharynx    Collection Time: 07/04/24 11:09 AM    Specimen: Nasopharynx; Swab   Result Value Ref Range    ADENOVIRUS, PCR Not Detected Not Detected    Coronavirus 229E Not Detected Not Detected    Coronavirus HKU1 Not Detected Not Detected    Coronavirus NL63 Not Detected Not Detected    Coronavirus OC43 Not Detected Not Detected    COVID19 Not Detected Not Detected - Ref. Range    Human Metapneumovirus Not Detected Not Detected    Human Rhinovirus/Enterovirus Not Detected Not Detected    Influenza A PCR Not Detected Not Detected    Influenza B PCR Not Detected Not Detected    Parainfluenza Virus 1 Not Detected Not Detected    Parainfluenza Virus 2 Not Detected Not Detected    Parainfluenza Virus 3 Not Detected Not Detected    Parainfluenza Virus 4 Not Detected Not Detected    RSV, PCR Not Detected Not Detected    Bordetella pertussis pcr  Not Detected Not Detected    Bordetella parapertussis PCR Not Detected Not Detected    Chlamydophila pneumoniae PCR Not Detected Not Detected    Mycoplasma pneumo by PCR Not Detected Not Detected       If labs were ordered, I independently reviewed the results and considered them in treating the patient.        RADIOLOGY  XR Chest 1 View    Result Date: 7/4/2024  PROCEDURE: XR CHEST 1 VW-  HISTORY: SOA Triage Protocol  COMPARISON: None.  FINDINGS: Atherosclerosis is noted. The heart is normal in size. The mediastinum is unremarkable. Prominent interstitial markings may be chronic. Questionable patchy bibasilar opacities may represent infectious or inflammatory process. There is no pneumothorax.  There are no acute osseous abnormalities.      Prominent interstitial markings may be chronic. Questionable patchy bibasilar opacities may represent infectious or inflammatory process.  Continued followup is recommended.    This report was signed and finalized on 7/4/2024 1:24 PM by Lester Shaver DO.      CT Chest Without Contrast Diagnostic    Result Date: 7/4/2024  PROCEDURE: CT CHEST WO CONTRAST DIAGNOSTIC-  HISTORY: eval pna, edema, equivocal cxr, cough, shortness of breath, fatigue x3 days  COMPARISON:  None .  PROCEDURE: Noncontrast exam. Axial images were obtained from the lung apex to the mid abdomen by computed tomography.  FINDINGS: Lack of intravenous contrast limits assessment of the solid organs, the mediastinum, and the vasculature.  CHEST: Spray artifact from right shoulder arthroplasty. Extensive atherosclerosis is noted. 1.9 cm precarinal lymph node. The heart is upper limits of normal in size. Left kidney appears atrophic. There is evidence of calcified granulomatous disease. Mild to moderate emphysematous changes are present. 6 mm noncalcified nodule in the right upper lobe on image 31. There are patchy bibasilar infiltrates, correlate for potential infectious or inflammatory process. No evidence of  pneumothorax. No acute osseous changes.      Patchy bibasilar opacities, correlate for infectious or inflammatory process.  6 mm right upper lobe nodule, follow-up CT chest in 3 months recommended. Per Fleischner Society criteria.  This study was performed with techniques to keep radiation doses as low as reasonably achievable (ALARA). Individualized dose reduction techniques using automated exposure control or adjustment of mA and/or kV according to the patient size were employed.   This report was signed and finalized on 7/4/2024 12:43 PM by Lester Shaver DO.       PROCEDURES    Procedures    ECG 12 Lead ED Triage Standing Order; SOA   Final Result          MEDICATIONS GIVEN IN ER    Medications   sodium chloride 0.9 % flush 10 mL (has no administration in time range)   amoxicillin-clavulanate (AUGMENTIN) 875-125 MG per tablet 1 tablet (1 tablet Oral Given 7/4/24 1347)   doxycycline (MONODOX) capsule 100 mg (100 mg Oral Given 7/4/24 1347)         MEDICAL DECISION MAKING, PROGRESS, and CONSULTS    All labs, if obtained, have been independently reviewed by me.  All radiology studies, if obtained, have been reviewed by me and the radiologist dictating the report.  All EKG's, if obtained, have been independently viewed and interpreted by me.      Discussion below represents my analysis of pertinent findings related to patient's condition, differential diagnosis, treatment plan and final disposition.                         Differential diagnosis:    PNA, CHF, bronchitis, COPD exacerbation, others.      Additional sources:    - Discussed/ obtained information from independent historians:  Daughter at bedside    - External (non-ED) record review:  Outpatient progress note 06/03/2024    - Chronic or social conditions impacting care:      - Shared decision making:        Orders placed during this visit:  Orders Placed This Encounter   Procedures    Respiratory Panel PCR w/COVID-19(SARS-CoV-2) NANCY/JUSTINA/MABEL/PAD/COR/MARLEEN  In-House, NP Swab in UTM/VTM, 2 HR TAT - Swab, Nasopharynx    XR Chest 1 View    CT Chest Without Contrast Diagnostic    [place] Draw    Comprehensive Metabolic Panel    BNP    Single High Sensitivity Troponin T    CBC Auto Differential    NPO Diet NPO Type: Strict NPO    Undress & Gown    Continuous Pulse Oximetry    Vital Signs    Oxygen Therapy- Nasal Cannula; Titrate 1-6 LPM Per SpO2; 90 - 95%    ECG 12 Lead ED Triage Standing Order; SOA    Insert Peripheral IV    CBC & Differential    Green Top (Gel)    Lavender Top    Gold Top - SST    Light Blue Top         Additional orders considered but not ordered:      ED Course:    Patient is a 83-year-old male with history of COPD, hypertension, diabetes presents for cough, shortness of breath.  Patient arrives to the emergency department in no acute distress.  His vital signs are reassuring he is maintaining oxygenation greater than 90%.  He is afebrile.  No tachycardia.  He was found to have a mild leukocytosis in addition he has mild RAFAELA BUN/creatinine of 26 and 1.51 respectively.  Patient does report poor p.o. intake.  Patient denies chest pain thus low suspicion for ACS no tachycardia tachypnea hypoxia or other history to suggest PE either. Chest x-ray equivocal for fluid versus infiltrate on my interpretation of imaging.  Given this CT of the chest was obtained.  This was notable for bilateral pneumonia on radiology interpretation likely infectious given associated leukocytosis. Incidental lung nodule noted. Patient is well-appearing and supportive of outpatient management for this.                Consultants:      Shared Decision Making:  After my consideration of clinical presentation and any laboratory/radiology studies obtained, I discussed the findings with the patient/patient representative who is in agreement with the treatment plan and the final disposition.   Risks and benefits of discharge and/or observation/admission were discussed.         AS OF 15:16  EDT VITALS:    BP - 145/81  HR - 63  TEMP - 97.3 °F (36.3 °C) (Oral)  O2 SATS - 93%                  DIAGNOSIS  Final diagnoses:   Pneumonia of both lungs due to infectious organism, unspecified part of lung   Lung nodule   RAFAELA (acute kidney injury)         DISPOSITION  ED Disposition       ED Disposition   Discharge    Condition   Stable    Comment   --                   Please note that portions of this document were completed with voice recognition software.        Aki Tena MD  07/04/24 4592

## 2024-07-10 ENCOUNTER — OFFICE VISIT (OUTPATIENT)
Dept: FAMILY MEDICINE CLINIC | Facility: CLINIC | Age: 84
End: 2024-07-10
Payer: MEDICARE

## 2024-07-10 VITALS
TEMPERATURE: 98 F | DIASTOLIC BLOOD PRESSURE: 70 MMHG | BODY MASS INDEX: 18.04 KG/M2 | OXYGEN SATURATION: 93 % | SYSTOLIC BLOOD PRESSURE: 110 MMHG | RESPIRATION RATE: 16 BRPM | HEIGHT: 70 IN | HEART RATE: 56 BPM | WEIGHT: 126 LBS

## 2024-07-10 DIAGNOSIS — I25.9 IHD (ISCHEMIC HEART DISEASE): Primary | ICD-10-CM

## 2024-07-10 DIAGNOSIS — R06.01 ORTHOPNEA: Primary | ICD-10-CM

## 2024-07-10 PROCEDURE — 1126F AMNT PAIN NOTED NONE PRSNT: CPT | Performed by: PHYSICIAN ASSISTANT

## 2024-07-10 PROCEDURE — 99214 OFFICE O/P EST MOD 30 MIN: CPT | Performed by: PHYSICIAN ASSISTANT

## 2024-07-10 PROCEDURE — 3074F SYST BP LT 130 MM HG: CPT | Performed by: PHYSICIAN ASSISTANT

## 2024-07-10 PROCEDURE — 3078F DIAST BP <80 MM HG: CPT | Performed by: PHYSICIAN ASSISTANT

## 2024-07-10 RX ORDER — CLOPIDOGREL BISULFATE 75 MG/1
75 TABLET ORAL DAILY
Qty: 90 TABLET | Refills: 0 | Status: SHIPPED | OUTPATIENT
Start: 2024-07-10

## 2024-07-10 RX ORDER — FUROSEMIDE 20 MG/1
20 TABLET ORAL DAILY
Qty: 3 TABLET | Refills: 0 | Status: SHIPPED | OUTPATIENT
Start: 2024-07-10

## 2024-07-12 ENCOUNTER — TELEPHONE (OUTPATIENT)
Dept: FAMILY MEDICINE CLINIC | Facility: CLINIC | Age: 84
End: 2024-07-12

## 2024-07-12 ENCOUNTER — LAB (OUTPATIENT)
Dept: FAMILY MEDICINE CLINIC | Facility: CLINIC | Age: 84
End: 2024-07-12
Payer: MEDICARE

## 2024-07-12 DIAGNOSIS — R06.01 ORTHOPNEA: ICD-10-CM

## 2024-07-12 LAB — NT-PROBNP SERPL-MCNC: 2722 PG/ML (ref 0–1800)

## 2024-07-12 PROCEDURE — 36415 COLL VENOUS BLD VENIPUNCTURE: CPT | Performed by: FAMILY MEDICINE

## 2024-07-12 PROCEDURE — 83880 ASSAY OF NATRIURETIC PEPTIDE: CPT | Performed by: PHYSICIAN ASSISTANT

## 2024-07-12 NOTE — TELEPHONE ENCOUNTER
Patient came in for blood work today. He wanted to let you know both of his hands were swollen. He felt very weak today

## 2024-07-15 ENCOUNTER — OFFICE VISIT (OUTPATIENT)
Dept: FAMILY MEDICINE CLINIC | Facility: CLINIC | Age: 84
End: 2024-07-15
Payer: MEDICARE

## 2024-07-15 ENCOUNTER — LAB (OUTPATIENT)
Dept: FAMILY MEDICINE CLINIC | Facility: CLINIC | Age: 84
End: 2024-07-15
Payer: MEDICARE

## 2024-07-15 VITALS
SYSTOLIC BLOOD PRESSURE: 110 MMHG | OXYGEN SATURATION: 92 % | RESPIRATION RATE: 18 BRPM | HEIGHT: 70 IN | HEART RATE: 54 BPM | WEIGHT: 124 LBS | BODY MASS INDEX: 17.75 KG/M2 | TEMPERATURE: 98 F | DIASTOLIC BLOOD PRESSURE: 70 MMHG

## 2024-07-15 DIAGNOSIS — R79.89 ELEVATED BRAIN NATRIURETIC PEPTIDE (BNP) LEVEL: ICD-10-CM

## 2024-07-15 DIAGNOSIS — I25.722 ATHEROSCLEROSIS OF AUTOLOGOUS ARTERY CORONARY ARTERY BYPASS GRAFT(S) WITH REFRACTORY ANGINA PECTORIS: ICD-10-CM

## 2024-07-15 DIAGNOSIS — J44.1 COPD WITH EXACERBATION: ICD-10-CM

## 2024-07-15 DIAGNOSIS — M25.571 ARTHRALGIA OF ANKLE, RIGHT: ICD-10-CM

## 2024-07-15 DIAGNOSIS — M25.571 ARTHRALGIA OF ANKLE, RIGHT: Primary | ICD-10-CM

## 2024-07-15 LAB
ERYTHROCYTE [SEDIMENTATION RATE] IN BLOOD: 24 MM/HR (ref 0–20)
NT-PROBNP SERPL-MCNC: 2730 PG/ML (ref 0–1800)
URATE SERPL-MCNC: 3.9 MG/DL (ref 3.4–7)

## 2024-07-15 PROCEDURE — 1126F AMNT PAIN NOTED NONE PRSNT: CPT | Performed by: PHYSICIAN ASSISTANT

## 2024-07-15 PROCEDURE — 36415 COLL VENOUS BLD VENIPUNCTURE: CPT | Performed by: FAMILY MEDICINE

## 2024-07-15 PROCEDURE — G2211 COMPLEX E/M VISIT ADD ON: HCPCS | Performed by: PHYSICIAN ASSISTANT

## 2024-07-15 PROCEDURE — 85652 RBC SED RATE AUTOMATED: CPT | Performed by: PHYSICIAN ASSISTANT

## 2024-07-15 PROCEDURE — 83880 ASSAY OF NATRIURETIC PEPTIDE: CPT | Performed by: PHYSICIAN ASSISTANT

## 2024-07-15 PROCEDURE — 3078F DIAST BP <80 MM HG: CPT | Performed by: PHYSICIAN ASSISTANT

## 2024-07-15 PROCEDURE — 99213 OFFICE O/P EST LOW 20 MIN: CPT | Performed by: PHYSICIAN ASSISTANT

## 2024-07-15 PROCEDURE — 84550 ASSAY OF BLOOD/URIC ACID: CPT | Performed by: PHYSICIAN ASSISTANT

## 2024-07-15 PROCEDURE — 3074F SYST BP LT 130 MM HG: CPT | Performed by: PHYSICIAN ASSISTANT

## 2024-07-15 NOTE — PROGRESS NOTES
Follow Up Office Visit      Date: 07/10/2024   Patient Name: Lucio Castañeda  : 1940   MRN: [id number]     Chief Complaint:    Chief Complaint   Patient presents with    Follow-up    Pneumonia    Hand Pain       History of Present Illness: Lucio Castañeda is a 83 y.o. male who is here today for a ER visit on 2024 for sudden onset cough with dyspnea on exertion.  He had not been eating well or sleeping well for a few days and developed a considerable amount of dyspnea.  He called his daughter and was transported to the emergency room where he was evaluated and discovered to have a pneumonia.  He was discharged to home on Augmentin.  He reports that he is eating and drinking a little bit better now but he feels that the Augmentin is strong on his stomach.  He continues to feel poorly with mostly dyspnea with exertion.  He continues to be under considerable amount of mental stress associated with his wife ental status changes and has moved in with his daughter not in  their home.  A BNP in the emergency room was noted to be elevated in the 4000's.  He has had no lower extremity edema and has had resolution of the pain in his left ankle which was diffusely swelling on her last visit.  He now has similar inflammatory changes to his left wrist without known injury she denies fever chills or sweats    Subjective      Review of Systems:   Review of Systems   Constitutional:  Positive for fatigue.   Respiratory:  Positive for cough and shortness of breath.    Cardiovascular: Negative.    Gastrointestinal: Negative.    Musculoskeletal:  Positive for arthralgias (left hand swollen red, no known injury).       I have reviewed the patients family history, social history, past medical history, past surgical history and have updated it as appropriate.     Medications:     Current Outpatient Medications:     amLODIPine (NORVASC) 10 MG tablet, TAKE ONE TABLET BY MOUTH EVERY DAY, Disp: 90 tablet, Rfl: 3     "amoxicillin-clavulanate (AUGMENTIN) 875-125 MG per tablet, Take 1 tablet by mouth 2 (Two) Times a Day., Disp: 20 tablet, Rfl: 0    aspirin 81 MG tablet, Take 1 tablet by mouth Daily., Disp: , Rfl:     carvedilol (COREG) 12.5 MG tablet, TAKE 1 TABLET BY MOUTH 2 (TWO) TIMES A DAY WITH MEALS., Disp: 180 tablet, Rfl: 0    omeprazole (priLOSEC) 40 MG capsule, TAKE ONE CAPSULE BY MOUTH EVERY DAY, Disp: 90 capsule, Rfl: 3    simvastatin (ZOCOR) 40 MG tablet, TAKE 1 TABLET BY MOUTH DAILY, Disp: 90 tablet, Rfl: 3    tobramycin-dexAMETHasone (TobraDex) 0.3-0.1 % ophthalmic suspension, Administer 1 drop to both eyes Every 4 (Four) Hours While Awake., Disp: 10 mL, Rfl: 0    Trelegy Ellipta 200-62.5-25 MCG/ACT inhaler, INHALE 1 PUFF BY MOUTH DAILY RINSE MOUTH AFTER EACH USE, Disp: 60 each, Rfl: 11    clopidogrel (PLAVIX) 75 MG tablet, Take 1 tablet by mouth Daily., Disp: 90 tablet, Rfl: 0    furosemide (Lasix) 20 MG tablet, Take 1 tablet by mouth Daily., Disp: 3 tablet, Rfl: 0    Allergies:   Allergies   Allergen Reactions    Morphine Hallucinations       Objective     Physical Exam: Please see above  Vital Signs:   Vitals:    07/10/24 1315   BP: 110/70   BP Location: Right arm   Patient Position: Sitting   Cuff Size: Adult   Pulse: 56   Resp: 16   Temp: 98 °F (36.7 °C)   TempSrc: Temporal   SpO2: 93%   Weight: 57.2 kg (126 lb)   Height: 177.8 cm (70\")     Body mass index is 18.08 kg/m².  Facility age limit for growth %iraida is 20 years.  BMI is below normal parameters (malnutrition). Recommendations: treating the underlying disease process       Physical Exam  Vitals and nursing note reviewed.   Constitutional:       General: He is not in acute distress.     Appearance: Normal appearance. He is not ill-appearing or toxic-appearing.   HENT:      Mouth/Throat:      Mouth: Mucous membranes are moist.      Pharynx: No oropharyngeal exudate or posterior oropharyngeal erythema.   Eyes:      Extraocular Movements: Extraocular movements " intact.      Pupils: Pupils are equal, round, and reactive to light.   Cardiovascular:      Rate and Rhythm: Normal rate and regular rhythm.      Heart sounds: No murmur heard.     No friction rub. No gallop.   Pulmonary:      Effort: Pulmonary effort is normal.      Breath sounds: Rales (all lung fields) present.   Musculoskeletal:         General: Swelling (left wrist erythematous and diffusely swollen) present.   Neurological:      Mental Status: He is alert.   Psychiatric:         Mood and Affect: Mood normal.         Behavior: Behavior normal.         Thought Content: Thought content normal.         Judgment: Judgment normal.     Procedures    Assessment / Plan      Assessment/Plan:   1. Orthopnea  Regular rate and rhythm on exam with diffuse rhonchi.  His BNP being 4000 suggest that he may have a cardiovascular component to some of his dyspnea.  We will repeat his BNP in 3 days after a furosemide 20 mg challenge  - proBNP; Future      Follow Up:   No follow-ups on file.      At [facility], we believe that sharing information builds trust and better relationships. You are receiving this note because you recently visited [facility]. It is possible you will see health information before a provider has talked with you about it. This kind of information can be easy to misunderstand. To help you fully understand what it means for your health, we urge you to discuss this note with your provider.    Yari Morel PA-C  [facility]

## 2024-07-16 ENCOUNTER — TELEPHONE (OUTPATIENT)
Dept: FAMILY MEDICINE CLINIC | Facility: CLINIC | Age: 84
End: 2024-07-16
Payer: MEDICARE

## 2024-07-16 RX ORDER — PREDNISONE 10 MG/1
TABLET ORAL
Qty: 36 TABLET | Refills: 0 | Status: SHIPPED | OUTPATIENT
Start: 2024-07-16 | End: 2024-07-23

## 2024-07-16 NOTE — TELEPHONE ENCOUNTER
Was seen by pennie yesterday, he said she was sending for steroid but braeden never received/   can you send

## 2024-07-17 NOTE — PROGRESS NOTES
Follow Up Office Visit      Date: 07/15/2024   Patient Name: Lucio Castañeda  : 1940   MRN: [id number]     Chief Complaint:    Chief Complaint   Patient presents with    Follow-up    Hand Pain       History of Present Illness: Lucio Castañeda is a 83 y.o. male who is here today for follow up recent dyspnea with a diagnosis of both pneumonia and an elevated BNP.  He finished his Lasix as prescribed and has continued to improve since his last visit.  He still has a productive cough but has had better exercise tolerance and appetite.  He has been afebrile. He offers no new complaints.  His mood has improved despite his wife still not being at home.  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  He continues to have migratory arthralgia with now both of his wrist being swollen and very stiff I the am.    Subjective      Review of Systems:   Review of Systems   Constitutional: Negative.    HENT:  Positive for congestion.    Respiratory:  Positive for cough and shortness of breath.    Cardiovascular: Negative.    Gastrointestinal: Negative.      I have reviewed the patients family history, social history, past medical history, past surgical history and have updated it as appropriate.     Medications:     Current Outpatient Medications:     amLODIPine (NORVASC) 10 MG tablet, TAKE ONE TABLET BY MOUTH EVERY DAY, Disp: 90 tablet, Rfl: 3    amoxicillin-clavulanate (AUGMENTIN) 875-125 MG per tablet, Take 1 tablet by mouth 2 (Two) Times a Day., Disp: 20 tablet, Rfl: 0    aspirin 81 MG tablet, Take 1 tablet by mouth Daily., Disp: , Rfl:     carvedilol (COREG) 12.5 MG tablet, TAKE 1 TABLET BY MOUTH 2 (TWO) TIMES A DAY WITH MEALS., Disp: 180 tablet, Rfl: 0    clopidogrel (PLAVIX) 75 MG tablet, Take 1 tablet by mouth Daily., Disp: 90 tablet, Rfl: 0    furosemide (Lasix) 20 MG tablet, Take 1 tablet by mouth Daily., Disp: 3 tablet, Rfl: 0    omeprazole (priLOSEC)  "40 MG capsule, TAKE ONE CAPSULE BY MOUTH EVERY DAY, Disp: 90 capsule, Rfl: 3    simvastatin (ZOCOR) 40 MG tablet, TAKE 1 TABLET BY MOUTH DAILY, Disp: 90 tablet, Rfl: 3    tobramycin-dexAMETHasone (TobraDex) 0.3-0.1 % ophthalmic suspension, Administer 1 drop to both eyes Every 4 (Four) Hours While Awake., Disp: 10 mL, Rfl: 0    Trelegy Ellipta 200-62.5-25 MCG/ACT inhaler, INHALE 1 PUFF BY MOUTH DAILY RINSE MOUTH AFTER EACH USE, Disp: 60 each, Rfl: 11    predniSONE (DELTASONE) 10 MG tablet, Take 8 tablets by mouth Daily for 1 day, THEN 7 tablets Daily for 1 day, THEN 6 tablets Daily for 1 day, THEN 5 tablets Daily for 1 day, THEN 4 tablets Daily for 1 day, THEN 3 tablets Daily for 1 day, THEN 2 tablets Daily for 1 day, THEN 1 tablet Daily for 1 day., Disp: 36 tablet, Rfl: 0    Allergies:   Allergies   Allergen Reactions    Morphine Hallucinations       Objective     Physical Exam: Please see above  Vital Signs:   Vitals:    07/15/24 1332   BP: 110/70   BP Location: Right arm   Patient Position: Sitting   Cuff Size: Adult   Pulse: 54   Resp: 18   Temp: 98 °F (36.7 °C)   TempSrc: Temporal   SpO2: 92%   Weight: 56.2 kg (124 lb)   Height: 177.8 cm (70\")     Body mass index is 17.79 kg/m².  Facility age limit for growth %iraida is 20 years.          Physical Exam  Vitals and nursing note reviewed.   Constitutional:       General: He is not in acute distress.     Appearance: Normal appearance. He is not ill-appearing or toxic-appearing.   Cardiovascular:      Rate and Rhythm: Normal rate and regular rhythm.      Heart sounds: No murmur heard.     No friction rub. No gallop.   Pulmonary:      Effort: Pulmonary effort is normal. No respiratory distress.      Breath sounds: No stridor. Rales (decreased from last week) present. No rhonchi.   Neurological:      Mental Status: He is alert.     Procedures    Assessment / Plan      Assessment/Plan:   1. Arthralgia of ankle, right  Now in both wrists as well  - Sedimentation Rate; " Future  - Uric Acid; Future    2. COPD with exacerbation  improving    3. Elevated brain natriuretic peptide (BNP)   Improving   - proBNP; Future    4. 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      At [facility], we believe that sharing information builds trust and better relationships. You are receiving this note because you recently visited [facility]. It is possible you will see health information before a provider has talked with you about it. This kind of information can be easy to misunderstand. To help you fully understand what it means for your health, we urge you to discuss this note with your provider.    Yari Morel PA-C  [facility]

## 2024-08-01 ENCOUNTER — OFFICE VISIT (OUTPATIENT)
Dept: FAMILY MEDICINE CLINIC | Facility: CLINIC | Age: 84
End: 2024-08-01
Payer: MEDICARE

## 2024-08-01 ENCOUNTER — LAB (OUTPATIENT)
Dept: FAMILY MEDICINE CLINIC | Facility: CLINIC | Age: 84
End: 2024-08-01
Payer: MEDICARE

## 2024-08-01 VITALS
TEMPERATURE: 97.7 F | DIASTOLIC BLOOD PRESSURE: 68 MMHG | HEART RATE: 51 BPM | HEIGHT: 70 IN | BODY MASS INDEX: 18.09 KG/M2 | OXYGEN SATURATION: 98 % | RESPIRATION RATE: 16 BRPM | SYSTOLIC BLOOD PRESSURE: 126 MMHG | WEIGHT: 126.4 LBS

## 2024-08-01 DIAGNOSIS — Z28.21 IMMUNIZATION REFUSED: ICD-10-CM

## 2024-08-01 DIAGNOSIS — M19.032 PRIMARY OSTEOARTHRITIS OF BOTH WRISTS: ICD-10-CM

## 2024-08-01 DIAGNOSIS — E78.5 DYSLIPIDEMIA: ICD-10-CM

## 2024-08-01 DIAGNOSIS — J43.1 PANLOBULAR EMPHYSEMA: ICD-10-CM

## 2024-08-01 DIAGNOSIS — R06.02 SHORTNESS OF BREATH: ICD-10-CM

## 2024-08-01 DIAGNOSIS — M19.031 PRIMARY OSTEOARTHRITIS OF BOTH WRISTS: ICD-10-CM

## 2024-08-01 DIAGNOSIS — I10 ESSENTIAL HYPERTENSION: ICD-10-CM

## 2024-08-01 DIAGNOSIS — I10 ESSENTIAL HYPERTENSION: Primary | ICD-10-CM

## 2024-08-01 DIAGNOSIS — R39.11 URINARY HESITANCY: ICD-10-CM

## 2024-08-01 DIAGNOSIS — M05.731 RHEUMATOID ARTHRITIS INVOLVING BOTH WRISTS WITH POSITIVE RHEUMATOID FACTOR: ICD-10-CM

## 2024-08-01 DIAGNOSIS — E11.9 TYPE 2 DIABETES MELLITUS WITHOUT COMPLICATION, WITHOUT LONG-TERM CURRENT USE OF INSULIN: ICD-10-CM

## 2024-08-01 DIAGNOSIS — M05.732 RHEUMATOID ARTHRITIS INVOLVING BOTH WRISTS WITH POSITIVE RHEUMATOID FACTOR: ICD-10-CM

## 2024-08-01 LAB
ALBUMIN SERPL-MCNC: 3.7 G/DL (ref 3.5–5.2)
ALBUMIN UR-MCNC: 2.4 MG/DL
ALBUMIN/GLOB SERPL: 1.1 G/DL
ALP SERPL-CCNC: 31 U/L (ref 39–117)
ALT SERPL W P-5'-P-CCNC: 13 U/L (ref 1–41)
ANION GAP SERPL CALCULATED.3IONS-SCNC: 11.5 MMOL/L (ref 5–15)
AST SERPL-CCNC: 14 U/L (ref 1–40)
BILIRUB SERPL-MCNC: 0.6 MG/DL (ref 0–1.2)
BILIRUB UR QL STRIP: NEGATIVE
BUN SERPL-MCNC: 26 MG/DL (ref 8–23)
BUN/CREAT SERPL: 21 (ref 7–25)
CALCIUM SPEC-SCNC: 8.9 MG/DL (ref 8.6–10.5)
CHLORIDE SERPL-SCNC: 104 MMOL/L (ref 98–107)
CHOLEST SERPL-MCNC: 162 MG/DL (ref 0–200)
CHROMATIN AB SERPL-ACNC: 16.4 IU/ML (ref 0–14)
CLARITY UR: CLEAR
CO2 SERPL-SCNC: 23.5 MMOL/L (ref 22–29)
COLOR UR: ABNORMAL
CREAT SERPL-MCNC: 1.24 MG/DL (ref 0.76–1.27)
CRP SERPL-MCNC: 4.23 MG/DL (ref 0–0.5)
EGFRCR SERPLBLD CKD-EPI 2021: 58 ML/MIN/1.73
ERYTHROCYTE [SEDIMENTATION RATE] IN BLOOD: 17 MM/HR (ref 0–20)
GLOBULIN UR ELPH-MCNC: 3.3 GM/DL
GLUCOSE SERPL-MCNC: 128 MG/DL (ref 65–99)
GLUCOSE UR STRIP-MCNC: ABNORMAL MG/DL
HBA1C MFR BLD: 6.7 % (ref 4.8–5.6)
HDLC SERPL-MCNC: 50 MG/DL (ref 40–60)
HGB UR QL STRIP.AUTO: NEGATIVE
KETONES UR QL STRIP: NEGATIVE
LDLC SERPL CALC-MCNC: 95 MG/DL (ref 0–100)
LDLC/HDLC SERPL: 1.88 {RATIO}
LEUKOCYTE ESTERASE UR QL STRIP.AUTO: ABNORMAL
NITRITE UR QL STRIP: NEGATIVE
NT-PROBNP SERPL-MCNC: 2911 PG/ML (ref 0–1800)
PH UR STRIP.AUTO: 6 [PH] (ref 5–8)
POTASSIUM SERPL-SCNC: 4 MMOL/L (ref 3.5–5.2)
PROT SERPL-MCNC: 7 G/DL (ref 6–8.5)
PROT UR QL STRIP: ABNORMAL
SODIUM SERPL-SCNC: 139 MMOL/L (ref 136–145)
SP GR UR STRIP: 1.03 (ref 1–1.03)
TRIGL SERPL-MCNC: 90 MG/DL (ref 0–150)
UROBILINOGEN UR QL STRIP: ABNORMAL
VLDLC SERPL-MCNC: 17 MG/DL (ref 5–40)

## 2024-08-01 PROCEDURE — 83880 ASSAY OF NATRIURETIC PEPTIDE: CPT | Performed by: FAMILY MEDICINE

## 2024-08-01 PROCEDURE — 81003 URINALYSIS AUTO W/O SCOPE: CPT | Performed by: FAMILY MEDICINE

## 2024-08-01 PROCEDURE — 86140 C-REACTIVE PROTEIN: CPT | Performed by: FAMILY MEDICINE

## 2024-08-01 PROCEDURE — 3078F DIAST BP <80 MM HG: CPT | Performed by: FAMILY MEDICINE

## 2024-08-01 PROCEDURE — 80061 LIPID PANEL: CPT | Performed by: FAMILY MEDICINE

## 2024-08-01 PROCEDURE — 86200 CCP ANTIBODY: CPT | Performed by: FAMILY MEDICINE

## 2024-08-01 PROCEDURE — 80053 COMPREHEN METABOLIC PANEL: CPT | Performed by: FAMILY MEDICINE

## 2024-08-01 PROCEDURE — 86038 ANTINUCLEAR ANTIBODIES: CPT | Performed by: FAMILY MEDICINE

## 2024-08-01 PROCEDURE — 83036 HEMOGLOBIN GLYCOSYLATED A1C: CPT | Performed by: FAMILY MEDICINE

## 2024-08-01 PROCEDURE — 1126F AMNT PAIN NOTED NONE PRSNT: CPT | Performed by: FAMILY MEDICINE

## 2024-08-01 PROCEDURE — 3074F SYST BP LT 130 MM HG: CPT | Performed by: FAMILY MEDICINE

## 2024-08-01 PROCEDURE — 1159F MED LIST DOCD IN RCRD: CPT | Performed by: FAMILY MEDICINE

## 2024-08-01 PROCEDURE — 1160F RVW MEDS BY RX/DR IN RCRD: CPT | Performed by: FAMILY MEDICINE

## 2024-08-01 PROCEDURE — 86431 RHEUMATOID FACTOR QUANT: CPT | Performed by: FAMILY MEDICINE

## 2024-08-01 PROCEDURE — 82043 UR ALBUMIN QUANTITATIVE: CPT | Performed by: FAMILY MEDICINE

## 2024-08-01 PROCEDURE — 99214 OFFICE O/P EST MOD 30 MIN: CPT | Performed by: FAMILY MEDICINE

## 2024-08-01 PROCEDURE — 85652 RBC SED RATE AUTOMATED: CPT | Performed by: FAMILY MEDICINE

## 2024-08-01 NOTE — PROGRESS NOTES
Follow Up Office Visit      Date: 2024   Patient Name: Lucio Castañeda  : 1940   MRN: [id number]     Chief Complaint:    Chief Complaint   Patient presents with    Follow-up    Hypertension    prostate pressure    Wrist Pain     Bilateral  wrist pain x 3-4 weeks.       History of Present Illness: Lucio Castañeda is a 83 y.o. male who is here today for follow-up.  Patient has been doing relatively well but is having continuation of bilateral wrist pain.  He has had some swelling and pain but without any erythema.  He was on a course of prednisone that did show improvement in his symptoms but when he discontinued his symptoms have recurred.  He also was having problems with urination.  He has some difficulty with flow but this does appear to be improved at present time.  He otherwise has continue with his medication as prescribed without any complaints.  Patient appears to be doing otherwise well.  They have continue with their medications without any side effects.  They have not had any changes in their usual activity, appetite and sleep.  Patient denies any other cardiovascular, respiratory, gastrointestinal, urologic or neurologic complaints.    Subjective      Review of Systems:   Review of Systems   Constitutional:  Negative for activity change, appetite change and fatigue.   Respiratory:  Negative for cough, chest tightness, shortness of breath and wheezing.    Cardiovascular:  Negative for chest pain, palpitations and leg swelling.   Gastrointestinal:  Negative for abdominal distention, abdominal pain, blood in stool, constipation, diarrhea, nausea, vomiting, GERD and indigestion.   Genitourinary:  Positive for difficulty urinating and nocturia. Negative for dysuria, flank pain, frequency, hematuria and urgency.   Musculoskeletal:  Positive for arthralgias. Negative for back pain, gait problem, joint swelling and myalgias.   Neurological:  Negative for dizziness, tremors, seizures, syncope,  weakness, light-headedness, numbness, headache and memory problem.   Psychiatric/Behavioral:  Negative for sleep disturbance and depressed mood. The patient is not nervous/anxious.        I have reviewed the patients family history, social history, past medical history, past surgical history and have updated it as appropriate.     Medications:     Current Outpatient Medications:     amLODIPine (NORVASC) 10 MG tablet, TAKE ONE TABLET BY MOUTH EVERY DAY, Disp: 90 tablet, Rfl: 3    aspirin 81 MG tablet, Take 1 tablet by mouth Daily., Disp: , Rfl:     carvedilol (COREG) 12.5 MG tablet, TAKE 1 TABLET BY MOUTH 2 (TWO) TIMES A DAY WITH MEALS., Disp: 180 tablet, Rfl: 0    clopidogrel (PLAVIX) 75 MG tablet, Take 1 tablet by mouth Daily., Disp: 90 tablet, Rfl: 0    furosemide (Lasix) 20 MG tablet, Take 1 tablet by mouth Daily., Disp: 3 tablet, Rfl: 0    omeprazole (priLOSEC) 40 MG capsule, TAKE ONE CAPSULE BY MOUTH EVERY DAY, Disp: 90 capsule, Rfl: 3    simvastatin (ZOCOR) 40 MG tablet, TAKE 1 TABLET BY MOUTH DAILY, Disp: 90 tablet, Rfl: 3    Trelegy Ellipta 200-62.5-25 MCG/ACT inhaler, INHALE 1 PUFF BY MOUTH DAILY RINSE MOUTH AFTER EACH USE, Disp: 60 each, Rfl: 11    Allergies:   Allergies   Allergen Reactions    Morphine Hallucinations       Immunizations:   Immunization History   Administered Date(s) Administered    COVID-19 (MODERNA) 1st,2nd,3rd Dose Monovalent 03/16/2021, 04/14/2021, 11/16/2021    FLUAD TRI 65YR+ 11/15/2019    Flu Vaccine Quad PF >36MO 11/17/2020    Fluad Quad 65+ 11/17/2020    Fluzone (or Fluarix & Flulaval for VFC) >6mos 11/17/2020    Fluzone High-Dose 65+yrs 11/13/2023    Fluzone Quad >6mos (Multi-dose) 08/24/2016    Influenza Inj MDCK Preserative Free 08/24/2016    Influenza, Unspecified 11/16/2018    Pneumococcal Conjugate 13-Valent (PCV13) 08/24/2016    Pneumococcal Polysaccharide (PPSV23) 12/11/2006        Objective     Physical Exam: Please see above  Vital Signs:   Vitals:    08/01/24 1051   BP:  "126/68   BP Location: Left arm   Patient Position: Sitting   Cuff Size: Adult   Pulse: 51   Resp: 16   Temp: 97.7 °F (36.5 °C)   TempSrc: Temporal   SpO2: 98%   Weight: 57.3 kg (126 lb 6.4 oz)   Height: 177.8 cm (70\")     Body mass index is 18.14 kg/m².          Physical Exam  Vitals and nursing note reviewed.   Constitutional:       Appearance: Normal appearance.   HENT:      Head: Normocephalic and atraumatic.      Nose: Nose normal.      Mouth/Throat:      Pharynx: Oropharynx is clear.   Eyes:      Extraocular Movements: Extraocular movements intact.      Pupils: Pupils are equal, round, and reactive to light.   Neck:      Thyroid: No thyroid mass or thyromegaly.      Trachea: Trachea normal.   Cardiovascular:      Rate and Rhythm: Normal rate and regular rhythm.      Pulses: Normal pulses. No decreased pulses.      Heart sounds: Normal heart sounds.   Pulmonary:      Effort: Pulmonary effort is normal.      Breath sounds: Normal breath sounds.   Abdominal:      General: Abdomen is flat. Bowel sounds are normal.      Palpations: Abdomen is soft.      Tenderness: There is no abdominal tenderness.   Musculoskeletal:      Cervical back: Neck supple.      Right lower leg: No edema.      Left lower leg: No edema.   Lymphadenopathy:      Cervical: No cervical adenopathy.   Skin:     General: Skin is warm and dry.   Neurological:      General: No focal deficit present.      Mental Status: He is alert and oriented to person, place, and time.      Sensory: Sensation is intact.      Motor: Motor function is intact.      Coordination: Coordination is intact.   Psychiatric:         Attention and Perception: Attention normal.         Mood and Affect: Mood normal.         Speech: Speech normal.         Behavior: Behavior normal.         Procedures    Results:   Labs:   Hemoglobin A1C   Date Value Ref Range Status   04/10/2024 6.00 (H) 4.80 - 5.60 % Final     TSH   Date Value Ref Range Status   04/10/2024 1.350 0.270 - 4.200 " uIU/mL Final        POCT Results (if applicable):   Results for orders placed or performed in visit on 07/15/24   Sedimentation Rate    Specimen: Arm, Left; Blood   Result Value Ref Range    Sed Rate 24 (H) 0 - 20 mm/hr   proBNP    Specimen: Arm, Left; Blood   Result Value Ref Range    proBNP 2,730.0 (H) 0.0 - 1,800.0 pg/mL   Uric Acid    Specimen: Arm, Left; Blood   Result Value Ref Range    Uric Acid 3.9 3.4 - 7.0 mg/dL       Imaging:   No valid procedures specified.     Measures:   Advanced Care Planning:   Patient has an advance directive in EMR which is still valid.     Smoking Cessation:   Non-smoker.    Assessment / Plan      Assessment/Plan:   Diagnoses and all orders for this visit:    1. Essential hypertension (Primary)  Patient appears to be tolerating their blood pressure medicine without any side effects.  We will continue to monitor their blood pressure and will adjust to keep there is systolic blood pressure less than 130.  We will continue to monitor renal function and will make adjustments based on these findings.  -     Comprehensive Metabolic Panel; Future    2. Dyslipidemia  Patient does appear to be tolerating their lipid-lowering agent without difficulty.  We will obtain the lipid profile as well as liver function test to observe for any abnormalities.  We will continue to adjust medications to keep their LDL less than 70.  -     Lipid Panel; Future    3. Panlobular emphysema   Patient appears to be doing well with respect to symptoms of his breathing and exam did not reveal any abnormality.  We will continue on his current regimen and monitor and if he has other problems or plans further assessment treatment will be necessary.    4. Immunization refused   Patient would benefit from having immunizations given.  Patient has elected not to receive the recommended vaccines at present time.  They understand the risk of not receiving these vaccine and the disease in which they may incur.  We have  discussed other options and will pursue with encouragement of compliance with future appointments.  If patient does change their minds prior to the next scheduled follow-up, they may contact us and we will provide immunization at that time.    5. Shortness of breath  Patient appears to have recovered from his pneumonia.  We will repeat a chest x-ray as well as a BNP as he did have some increase in his level during his pneumonia.  We will continue to monitor and will make adjustments accordingly.  -     proBNP; Future  -     XR Chest PA & Lateral; Future    6. Type 2 diabetes mellitus without complication, without long-term current use of insulin  Patient does appear to be doing relatively well with respect to their diabetes.  They are tolerating their medications without complaints.  We will continue to follow their hemoglobin A1c and will make adjustments to keep their level less than 7%.  -     Hemoglobin A1c; Future  -     MicroAlbumin, Urine, Random - Urine, Clean Catch; Future  -     Urinalysis without microscopic (no culture) - Urine, Clean Catch; Future    7. Primary osteoarthritis of both wrists  Patient does continue to have bilateral wrist pain.  There does not appear to be any abnormalities on exam.  He did have elevation of some inflammatory markers and did have improvement of symptoms with steroids.  He may have a post reactive arthritis.  We will repeat laboratory data and will consider the initiation of steroids if he does have a continuation of elevation of his inflammatory markers.  We will also make certain that he does not have any underlying autoimmune arthritis.  -     Sedimentation Rate; Future  -     C-reactive Protein; Future  -     Rheumatoid Factor; Future  -     TIERRA by IFA, Reflex 9-biomarkers profile; Future  -     Cyclic Citrul Peptide Antibody, IgG / IgA; Future    8. Urinary hesitancy   Patient did have some problems with urinary hesitancy that does appear to be improved at present  time.  We have discussed about the options of initiation of Flomax which she does not wish to pursue at present time.  We will continue to monitor his symptoms and if he does have worsening symptomatology he will contact us and we will initiate Flomax.  We may also obtain renal ultrasounds as well as a PVR and make adjustments based on these findings.      Follow Up:   Return in about 10 weeks (around 10/10/2024).      At [facility], we believe that sharing information builds trust and better relationships. You are receiving this note because you recently visited [facility]. It is possible you will see health information before a provider has talked with you about it. This kind of information can be easy to misunderstand. To help you fully understand what it means for your health, we urge you to discuss this note with your provider.    Rocky Morel MD  [facility]

## 2024-08-02 LAB — CCP IGA+IGG SERPL IA-ACNC: 27 UNITS (ref 0–19)

## 2024-08-02 RX ORDER — PREDNISONE 10 MG/1
10 TABLET ORAL DAILY
Qty: 30 TABLET | Refills: 1 | Status: SHIPPED | OUTPATIENT
Start: 2024-08-02

## 2024-08-05 LAB
ANA SER QL IF: NEGATIVE
LABORATORY COMMENT REPORT: NORMAL

## 2024-08-12 ENCOUNTER — OFFICE VISIT (OUTPATIENT)
Dept: FAMILY MEDICINE CLINIC | Facility: CLINIC | Age: 84
End: 2024-08-12
Payer: MEDICARE

## 2024-08-12 VITALS
DIASTOLIC BLOOD PRESSURE: 70 MMHG | RESPIRATION RATE: 18 BRPM | BODY MASS INDEX: 18.47 KG/M2 | TEMPERATURE: 98 F | HEIGHT: 70 IN | WEIGHT: 129 LBS | SYSTOLIC BLOOD PRESSURE: 112 MMHG | HEART RATE: 60 BPM | OXYGEN SATURATION: 98 %

## 2024-08-12 DIAGNOSIS — M05.732 RHEUMATOID ARTHRITIS INVOLVING BOTH WRISTS WITH POSITIVE RHEUMATOID FACTOR: Primary | ICD-10-CM

## 2024-08-12 DIAGNOSIS — E11.9 TYPE 2 DIABETES MELLITUS WITHOUT COMPLICATION, WITHOUT LONG-TERM CURRENT USE OF INSULIN: ICD-10-CM

## 2024-08-12 DIAGNOSIS — E78.5 DYSLIPIDEMIA: ICD-10-CM

## 2024-08-12 DIAGNOSIS — M05.731 RHEUMATOID ARTHRITIS INVOLVING BOTH WRISTS WITH POSITIVE RHEUMATOID FACTOR: Primary | ICD-10-CM

## 2024-08-12 DIAGNOSIS — I10 ESSENTIAL HYPERTENSION: ICD-10-CM

## 2024-08-12 DIAGNOSIS — R06.02 SHORTNESS OF BREATH: ICD-10-CM

## 2024-08-12 PROCEDURE — 1126F AMNT PAIN NOTED NONE PRSNT: CPT | Performed by: FAMILY MEDICINE

## 2024-08-12 PROCEDURE — 3078F DIAST BP <80 MM HG: CPT | Performed by: FAMILY MEDICINE

## 2024-08-12 PROCEDURE — G2211 COMPLEX E/M VISIT ADD ON: HCPCS | Performed by: FAMILY MEDICINE

## 2024-08-12 PROCEDURE — 99214 OFFICE O/P EST MOD 30 MIN: CPT | Performed by: FAMILY MEDICINE

## 2024-08-12 PROCEDURE — 1160F RVW MEDS BY RX/DR IN RCRD: CPT | Performed by: FAMILY MEDICINE

## 2024-08-12 PROCEDURE — 3074F SYST BP LT 130 MM HG: CPT | Performed by: FAMILY MEDICINE

## 2024-08-12 PROCEDURE — 1159F MED LIST DOCD IN RCRD: CPT | Performed by: FAMILY MEDICINE

## 2024-08-12 NOTE — PROGRESS NOTES
Follow Up Office Visit      Date: 2024   Patient Name: Lucio Castañeda  : 1940   MRN: [id number]     Chief Complaint:    Chief Complaint   Patient presents with    Follow-up    Hypertension       History of Present Illness: Lucio Castañeda is a 83 y.o. male who is here today for follow-up.  Patient is doing much better since last being seen.  He is tolerating the 10 mg of prednisone without difficulty and has had complete resolution of his bilateral wrist pain.  He did have laboratory data that confirmed that he does have rheumatoid arthritis.  He denies any issues at present time.  He has continue with his other medications without complaints.  He believes his breathing has been the best that he has had in quite some time.  Patient appears to be doing otherwise well.  They have continue with their medications without any side effects.  They have not had any changes in their usual activity, appetite and sleep.  Patient denies any other cardiovascular, respiratory, gastrointestinal, urologic or neurologic complaints.    Subjective      Review of Systems:   Review of Systems   Constitutional:  Negative for activity change, appetite change and fatigue.   Respiratory:  Negative for cough, chest tightness, shortness of breath and wheezing.    Cardiovascular:  Negative for chest pain, palpitations and leg swelling.   Gastrointestinal:  Negative for abdominal distention, abdominal pain, blood in stool, constipation, diarrhea, nausea, vomiting, GERD and indigestion.   Genitourinary:  Negative for difficulty urinating, dysuria, flank pain, frequency, hematuria and urgency.   Musculoskeletal:  Negative for arthralgias, back pain, gait problem, joint swelling and myalgias.   Neurological:  Negative for dizziness, tremors, seizures, syncope, weakness, light-headedness, numbness, headache and memory problem.   Psychiatric/Behavioral:  Negative for sleep disturbance and depressed mood. The patient is not  nervous/anxious.        I have reviewed the patients family history, social history, past medical history, past surgical history and have updated it as appropriate.     Medications:     Current Outpatient Medications:     amLODIPine (NORVASC) 10 MG tablet, TAKE ONE TABLET BY MOUTH EVERY DAY, Disp: 90 tablet, Rfl: 3    aspirin 81 MG tablet, Take 1 tablet by mouth Daily., Disp: , Rfl:     carvedilol (COREG) 12.5 MG tablet, TAKE 1 TABLET BY MOUTH 2 (TWO) TIMES A DAY WITH MEALS., Disp: 180 tablet, Rfl: 0    clopidogrel (PLAVIX) 75 MG tablet, Take 1 tablet by mouth Daily., Disp: 90 tablet, Rfl: 0    furosemide (Lasix) 20 MG tablet, Take 1 tablet by mouth Daily., Disp: 3 tablet, Rfl: 0    omeprazole (priLOSEC) 40 MG capsule, TAKE ONE CAPSULE BY MOUTH EVERY DAY, Disp: 90 capsule, Rfl: 3    predniSONE (DELTASONE) 10 MG tablet, Take 1 tablet by mouth Daily., Disp: 30 tablet, Rfl: 1    simvastatin (ZOCOR) 40 MG tablet, TAKE 1 TABLET BY MOUTH DAILY, Disp: 90 tablet, Rfl: 3    Trelegy Ellipta 200-62.5-25 MCG/ACT inhaler, INHALE 1 PUFF BY MOUTH DAILY RINSE MOUTH AFTER EACH USE, Disp: 60 each, Rfl: 11    Allergies:   Allergies   Allergen Reactions    Morphine Hallucinations       Immunizations:   Immunization History   Administered Date(s) Administered    COVID-19 (MODERNA) 1st,2nd,3rd Dose Monovalent 03/16/2021, 04/14/2021, 11/16/2021    FLUAD TRI 65YR+ 11/15/2019    Flu Vaccine Quad PF >36MO 11/17/2020    Fluad Quad 65+ 11/17/2020    Fluzone (or Fluarix & Flulaval for VFC) >6mos 11/17/2020    Fluzone High-Dose 65+yrs 11/13/2023    Fluzone Quad >6mos (Multi-dose) 08/24/2016    Influenza Inj MDCK Preserative Free 08/24/2016    Influenza, Unspecified 11/16/2018    Pneumococcal Conjugate 13-Valent (PCV13) 08/24/2016    Pneumococcal Polysaccharide (PPSV23) 12/11/2006        Objective     Physical Exam: Please see above  Vital Signs:   Vitals:    08/12/24 1328   BP: 112/70   BP Location: Right arm   Patient Position: Sitting   Cuff  "Size: Adult   Pulse: 60   Resp: 18   Temp: 98 °F (36.7 °C)   TempSrc: Temporal   SpO2: 98%   Weight: 58.5 kg (129 lb)   Height: 177.8 cm (70\")     Body mass index is 18.51 kg/m².  BMI is within normal parameters. No other follow-up for BMI required.       Physical Exam  Vitals and nursing note reviewed.   Constitutional:       Appearance: Normal appearance.   HENT:      Head: Normocephalic and atraumatic.      Nose: Nose normal.      Mouth/Throat:      Pharynx: Oropharynx is clear.   Eyes:      Extraocular Movements: Extraocular movements intact.      Pupils: Pupils are equal, round, and reactive to light.   Neck:      Thyroid: No thyroid mass or thyromegaly.      Trachea: Trachea normal.   Cardiovascular:      Rate and Rhythm: Normal rate and regular rhythm.      Pulses: Normal pulses. No decreased pulses.      Heart sounds: Normal heart sounds.   Pulmonary:      Effort: Pulmonary effort is normal.      Breath sounds: Normal breath sounds.   Abdominal:      General: Abdomen is flat. Bowel sounds are normal.      Palpations: Abdomen is soft.      Tenderness: There is no abdominal tenderness.   Musculoskeletal:      Cervical back: Neck supple.      Right lower leg: No edema.      Left lower leg: No edema.   Lymphadenopathy:      Cervical: No cervical adenopathy.   Skin:     General: Skin is warm and dry.   Neurological:      General: No focal deficit present.      Mental Status: He is alert and oriented to person, place, and time.      Sensory: Sensation is intact.      Motor: Motor function is intact.      Coordination: Coordination is intact.   Psychiatric:         Attention and Perception: Attention normal.         Mood and Affect: Mood normal.         Speech: Speech normal.         Behavior: Behavior normal.         Procedures    Results:   Labs:   Hemoglobin A1C   Date Value Ref Range Status   08/01/2024 6.70 (H) 4.80 - 5.60 % Final     TSH   Date Value Ref Range Status   04/10/2024 1.350 0.270 - 4.200 uIU/mL " Final        POCT Results (if applicable):   Results for orders placed or performed in visit on 08/01/24   Comprehensive Metabolic Panel    Specimen: Arm, Right; Blood   Result Value Ref Range    Glucose 128 (H) 65 - 99 mg/dL    BUN 26 (H) 8 - 23 mg/dL    Creatinine 1.24 0.76 - 1.27 mg/dL    Sodium 139 136 - 145 mmol/L    Potassium 4.0 3.5 - 5.2 mmol/L    Chloride 104 98 - 107 mmol/L    CO2 23.5 22.0 - 29.0 mmol/L    Calcium 8.9 8.6 - 10.5 mg/dL    Total Protein 7.0 6.0 - 8.5 g/dL    Albumin 3.7 3.5 - 5.2 g/dL    ALT (SGPT) 13 1 - 41 U/L    AST (SGOT) 14 1 - 40 U/L    Alkaline Phosphatase 31 (L) 39 - 117 U/L    Total Bilirubin 0.6 0.0 - 1.2 mg/dL    Globulin 3.3 gm/dL    A/G Ratio 1.1 g/dL    BUN/Creatinine Ratio 21.0 7.0 - 25.0    Anion Gap 11.5 5.0 - 15.0 mmol/L    eGFR 58.0 (L) >60.0 mL/min/1.73   Hemoglobin A1c    Specimen: Arm, Right; Blood   Result Value Ref Range    Hemoglobin A1C 6.70 (H) 4.80 - 5.60 %   Lipid Panel    Specimen: Arm, Right; Blood   Result Value Ref Range    Total Cholesterol 162 0 - 200 mg/dL    Triglycerides 90 0 - 150 mg/dL    HDL Cholesterol 50 40 - 60 mg/dL    LDL Cholesterol  95 0 - 100 mg/dL    VLDL Cholesterol 17 5 - 40 mg/dL    LDL/HDL Ratio 1.88    proBNP    Specimen: Arm, Right; Blood   Result Value Ref Range    proBNP 2,911.0 (H) 0.0 - 1,800.0 pg/mL   Sedimentation Rate    Specimen: Arm, Right; Blood   Result Value Ref Range    Sed Rate 17 0 - 20 mm/hr   C-reactive Protein    Specimen: Arm, Right; Blood   Result Value Ref Range    C-Reactive Protein 4.23 (H) 0.00 - 0.50 mg/dL   Rheumatoid Factor    Specimen: Arm, Right; Blood   Result Value Ref Range    Rheumatoid Factor Quantitative 16.4 (H) 0.0 - 14.0 IU/mL   TIERRA by IFA, Reflex 9-biomarkers profile    Specimen: Arm, Right; Blood   Result Value Ref Range    TIERRA Negative     Please note Comment    Cyclic Citrul Peptide Antibody, IgG / IgA    Specimen: Arm, Right; Blood   Result Value Ref Range    CCP Antibodies IgG/IgA 27 (H) 0 -  19 units   MicroAlbumin, Urine, Random - Urine, Clean Catch    Specimen: Urine, Clean Catch   Result Value Ref Range    Microalbumin, Urine 2.4 mg/dL   Urinalysis without microscopic (no culture) - Urine, Clean Catch    Specimen: Urine, Clean Catch   Result Value Ref Range    Color, UA Dark Yellow (A) Yellow, Straw    Appearance, UA Clear Clear    pH, UA 6.0 5.0 - 8.0    Specific Gravity, UA 1.026 1.005 - 1.030    Glucose,  mg/dL (Trace) (A) Negative    Ketones, UA Negative Negative    Bilirubin, UA Negative Negative    Blood, UA Negative Negative    Protein, UA 30 mg/dL (1+) (A) Negative    Leuk Esterase, UA Moderate (2+) (A) Negative    Nitrite, UA Negative Negative    Urobilinogen, UA 1.0 E.U./dL 0.2 - 1.0 E.U./dL       Imaging:   No valid procedures specified.     Measures:   Advanced Care Planning:   Patient has an advance directive in EMR which is still valid.     Smoking Cessation:   Non-smoker.    Assessment / Plan      Assessment/Plan:   Diagnoses and all orders for this visit:    1. 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  Patient is reluctant to go to the rheumatologist at present time even though he understands that rheumatoid arthritis is a chronic disease and that he cannot stay on prednisone always.  We have encouraged him to cut the prednisone to 5 mg with reassessment in the next couple weeks.  If he does have continuation of improvement we will discuss other options.  This is a chronic condition that we will take modification of the medications.    2. Essential hypertension   Patient appears to be tolerating their blood pressure medicine without any side effects.  We will continue to monitor their blood pressure and will adjust to keep there is systolic blood pressure less than 130.  We will continue to monitor renal  function and will make adjustments based on these findings.    3. Type 2 diabetes mellitus without complication, without long-term current use of insulin   Patient does appear to be doing relatively well with respect to their diabetes.  They are tolerating their medications without complaints.  We will continue to follow their hemoglobin A1c and will make adjustments to keep their level less than 7%.  Patient is hemoglobin A1c was higher than it was previously with a level of 6.7%.  We have encouraged him to monitor his carbohydrate intake.  We may consider further treatment in the future if it does worsen.    4. Dyslipidemia   Patient does appear to be tolerating their lipid-lowering agent without difficulty.  We will obtain the lipid profile as well as liver function test to observe for any abnormalities.  We will continue to adjust medications to keep their LDL less than 70.    5. Shortness of breath        Follow Up:   Return in about 22 days (around 9/3/2024).      At [facility], we believe that sharing information builds trust and better relationships. You are receiving this note because you recently visited [facility]. It is possible you will see health information before a provider has talked with you about it. This kind of information can be easy to misunderstand. To help you fully understand what it means for your health, we urge you to discuss this note with your provider.    Rocky Morel MD  [facility] [place]

## 2024-08-22 ENCOUNTER — OFFICE VISIT (OUTPATIENT)
Dept: FAMILY MEDICINE CLINIC | Facility: CLINIC | Age: 84
End: 2024-08-22
Payer: MEDICARE

## 2024-08-22 VITALS
OXYGEN SATURATION: 98 % | RESPIRATION RATE: 18 BRPM | BODY MASS INDEX: 18.35 KG/M2 | SYSTOLIC BLOOD PRESSURE: 136 MMHG | DIASTOLIC BLOOD PRESSURE: 70 MMHG | WEIGHT: 128.2 LBS | TEMPERATURE: 98.7 F | HEIGHT: 70 IN | HEART RATE: 61 BPM

## 2024-08-22 DIAGNOSIS — M05.732 RHEUMATOID ARTHRITIS INVOLVING BOTH WRISTS WITH POSITIVE RHEUMATOID FACTOR: Primary | ICD-10-CM

## 2024-08-22 DIAGNOSIS — M05.731 RHEUMATOID ARTHRITIS INVOLVING BOTH WRISTS WITH POSITIVE RHEUMATOID FACTOR: Primary | ICD-10-CM

## 2024-08-22 PROCEDURE — 99213 OFFICE O/P EST LOW 20 MIN: CPT

## 2024-08-22 PROCEDURE — 3078F DIAST BP <80 MM HG: CPT

## 2024-08-22 PROCEDURE — 1160F RVW MEDS BY RX/DR IN RCRD: CPT

## 2024-08-22 PROCEDURE — 1125F AMNT PAIN NOTED PAIN PRSNT: CPT

## 2024-08-22 PROCEDURE — 3075F SYST BP GE 130 - 139MM HG: CPT

## 2024-08-22 PROCEDURE — 1159F MED LIST DOCD IN RCRD: CPT

## 2024-08-22 RX ORDER — PREDNISONE 10 MG/1
10 TABLET ORAL DAILY
Qty: 30 TABLET | Refills: 1 | Status: SHIPPED | OUTPATIENT
Start: 2024-08-22

## 2024-08-22 NOTE — PROGRESS NOTES
Office Note     Name: Lucio Castañeda    : 1940     MRN: [id number]     Chief Complaint  Wrist Pain (3 weeks )    History of Present Illness:  Lucio Castañeda is a 83 y.o. male who presents today for complaints of wrist pain.  He has been diagnosed with rheumatoid arthritis.  He previously was prescribed 10 mg of prednisone by Dr. Morel.  On , prednisone was decreased to 5 mg daily.  Pito reports that since this decrease of prednisone, his wrist pain has been much worse.  He reports the pain is in both wrists.  He denies any trauma or overuse of the wrist.  He reports that both wrists are swollen.  He denies any redness to the wrist.  He reports that the pain is interfering with his ability to sleep at night.  He has had no fever or chills.  He is willing to see rheumatologist for discussion of other therapies.      Subjective     Review of Systems:   Review of Systems   Constitutional:  Negative for chills and fever.   Respiratory:  Negative for shortness of breath.    Cardiovascular:  Negative for chest pain.   Musculoskeletal:  Positive for arthralgias (Bilateral wrist pain) and joint swelling (Bilateral wrist swelling).       I have reviewed the patients family history, social history, past medical history, past surgical history and have updated it as appropriate.     Past Medical History:   Past Medical History:   Diagnosis Date   • Acid reflux    • Acute bronchitis    • Allergic rhinitis    • Amaurosis fugax, left eye    • Anemia    • CAD (coronary artery disease) 2019    NUCLEAR GXT EF 43% INF HYPOKINESIS (FIXED DEFECT)   • Carotid stenosis, symptomatic w/o infarct, left 2020    left sided amaurosis fugax   • Color change in pigmented skin lesion    • COPD exacerbation    • COPD, moderate    • Diverticulosis    • Dysuria    • Eustachian tube dysfunction    • Ganglion, tendon sheath    • Gastroenteritis, acute    • GERD (gastroesophageal reflux disease)    • Heart failure     • Hematuria    • Hypercholesterolemia    • Hyperlipidemia    • Hypertension    • IHD (ischemic heart disease)    • Left low back pain    • Malignant hypertension    • Mixed dyslipidemia    • Osteoarthrosis, generalized, involving multiple sites    • Pneumococcal vaccination declined    • Pneumonia    • Right shoulder pain    • Sinusitis, acute    • Skin cancer, basal cell    • Tympanic membrane perforation    • UTI (urinary tract infection)        Past Surgical History:   Past Surgical History:   Procedure Laterality Date   • BLADDER TUMOR EXCISION     • CAROTID STENT N/A 2020    Procedure: Carotid Stent;  Surgeon: Edu Montilla MD;  Location: [facility] INVASIVE LOCATION;  Service: Interventional Radiology;  Laterality: N/A;   • COLON SURGERY     • COLONOSCOPY      NORMAL   • COLOSTOMY      W/ REPAIR S/P PERFORATED DIVERTICULI   • CORONARY ANGIOPLASTY  2015    S/P STENT X3 EF 50% INFEROBASILAR HYPOKINESIS S/P STENT 2015       Family History:   Family History   Problem Relation Age of Onset   • Hypertension Mother    • Cancer Father    • Hyperlipidemia Other        Social History:   Social History     Socioeconomic History   • Marital status:    Tobacco Use   • Smoking status: Former     Current packs/day: 0.00     Average packs/day: 1 pack/day for 28.5 years (28.5 ttl pk-yrs)     Types: Cigarettes     Start date:      Quit date: 7/10/1997     Years since quittin.1     Passive exposure: Past   • Smokeless tobacco: Current     Types: Snuff, Chew   Vaping Use   • Vaping status: Never Used   Substance and Sexual Activity   • Alcohol use: No   • Drug use: No   • Sexual activity: Not Currently     Partners: Female       Immunizations:   Immunization History   Administered Date(s) Administered   • COVID-19 (MODERNA) 1st,2nd,3rd Dose Monovalent 2021, 2021, 2021   • FLUAD TRI 65YR+ 11/15/2019   • Flu Vaccine Quad PF >36MO 2020   • Fluad Quad 65+ 2020   •  "Fluzone (or Fluarix & Flulaval for VFC) >6mos 11/17/2020   • Fluzone High-Dose 65+yrs 11/13/2023   • Fluzone Quad >6mos (Multi-dose) 08/24/2016   • Influenza Inj MDCK Preserative Free 08/24/2016   • Influenza, Unspecified 11/16/2018   • Pneumococcal Conjugate 13-Valent (PCV13) 08/24/2016   • Pneumococcal Polysaccharide (PPSV23) 12/11/2006        Medications:     Current Outpatient Medications:   •  amLODIPine (NORVASC) 10 MG tablet, TAKE ONE TABLET BY MOUTH EVERY DAY, Disp: 90 tablet, Rfl: 3  •  aspirin 81 MG tablet, Take 1 tablet by mouth Daily., Disp: , Rfl:   •  carvedilol (COREG) 12.5 MG tablet, TAKE 1 TABLET BY MOUTH 2 (TWO) TIMES A DAY WITH MEALS., Disp: 180 tablet, Rfl: 0  •  clopidogrel (PLAVIX) 75 MG tablet, Take 1 tablet by mouth Daily., Disp: 90 tablet, Rfl: 0  •  furosemide (Lasix) 20 MG tablet, Take 1 tablet by mouth Daily., Disp: 3 tablet, Rfl: 0  •  omeprazole (priLOSEC) 40 MG capsule, TAKE ONE CAPSULE BY MOUTH EVERY DAY, Disp: 90 capsule, Rfl: 3  •  simvastatin (ZOCOR) 40 MG tablet, TAKE 1 TABLET BY MOUTH DAILY, Disp: 90 tablet, Rfl: 3  •  Trelegy Ellipta 200-62.5-25 MCG/ACT inhaler, INHALE 1 PUFF BY MOUTH DAILY RINSE MOUTH AFTER EACH USE, Disp: 60 each, Rfl: 11  •  predniSONE (DELTASONE) 10 MG tablet, Take 1 tablet by mouth Daily. Take 1 tablet by mouth for 7 days, then decrease to 0.5 tablets, Disp: 30 tablet, Rfl: 1    Allergies:   Allergies   Allergen Reactions   • Morphine Hallucinations       Objective     Vital Signs  Vitals:    08/22/24 1126   BP: 136/70   BP Location: Right arm   Patient Position: Sitting   Cuff Size: Adult   Pulse: 61   Resp: 18   Temp: 98.7 °F (37.1 °C)   TempSrc: Temporal   SpO2: 98%   Weight: 58.2 kg (128 lb 3.2 oz)   Height: 177.8 cm (70\")   PainSc: 10-Worst pain ever   PainLoc: Wrist     Estimated body mass index is 18.39 kg/m² as calculated from the following:    Height as of this encounter: 177.8 cm (70\").    Weight as of this encounter: 58.2 kg (128 lb 3.2 oz).      "       Physical Exam  Vitals and nursing note reviewed.   Constitutional:       General: He is not in acute distress.     Appearance: Normal appearance. He is not ill-appearing, toxic-appearing or diaphoretic.   HENT:      Head: Normocephalic and atraumatic.   Eyes:      Extraocular Movements: Extraocular movements intact.   Cardiovascular:      Rate and Rhythm: Normal rate and regular rhythm.      Heart sounds: No murmur heard.     No friction rub. No gallop.   Pulmonary:      Effort: Pulmonary effort is normal. No respiratory distress.      Breath sounds: No wheezing, rhonchi or rales.   Musculoskeletal:      Right wrist: Swelling (Mild) present. No bony tenderness. Decreased range of motion (Due to pain). Normal pulse.      Left wrist: Swelling (Mild) present. No bony tenderness. Decreased range of motion (Due to pain). Normal pulse.      Right hand: No swelling or bony tenderness. Normal range of motion. Normal capillary refill.      Left hand: No swelling or bony tenderness. Normal range of motion. Normal capillary refill.      Cervical back: Normal range of motion.      Right lower leg: No edema.      Left lower leg: No edema.      Comments: No erythema to bilateral wrists, no open wounds to wrists or nearby   Skin:     Coloration: Skin is not pale.   Neurological:      Mental Status: He is alert and oriented to person, place, and time. Mental status is at baseline.   Psychiatric:         Mood and Affect: Mood normal.         Thought Content: Thought content normal.          Assessment and Plan     1. 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 Morel, my supervising physician and the patient's PCP.  We will dose escalate patient back up to prednisone 10 mg daily.  Will try to do this for only 7 days and then decrease back to 5 mg of prednisone.  The patient understands he cannot be on prednisone long-term.  He is agreeable to  rheumatology referral for further evaluation and discussion of other treatment options.  -I have recommended short course of steroids. We did discuss possible side effects of steroids, including but not limited to: Jitteriness, increased hunger, irritability, bloating, increase in blood sugar or blood pressure, decreased ability to sleep. Patient reports they have tolerated steroids well in the past.  -If symptoms worsen, persist, or new symptoms develop, he has been advised to return to care.  He does have follow-up appointment soon with Dr. Morel.  - Ambulatory Referral to Rheumatology  - predniSONE (DELTASONE) 10 MG tablet; Take 1 tablet by mouth Daily. Take 1 tablet by mouth for 7 days, then decrease to 0.5 tablets  Dispense: 30 tablet; Refill: 1       Follow Up  Return if symptoms worsen or fail to improve, for Next scheduled follow up.    Leeanne Shah PA-C  [facility]

## 2024-09-03 ENCOUNTER — OFFICE VISIT (OUTPATIENT)
Dept: FAMILY MEDICINE CLINIC | Facility: CLINIC | Age: 84
End: 2024-09-03
Payer: MEDICARE

## 2024-09-03 VITALS
SYSTOLIC BLOOD PRESSURE: 126 MMHG | HEART RATE: 61 BPM | WEIGHT: 132.6 LBS | RESPIRATION RATE: 18 BRPM | DIASTOLIC BLOOD PRESSURE: 60 MMHG | HEIGHT: 70 IN | TEMPERATURE: 98.7 F | BODY MASS INDEX: 18.98 KG/M2 | OXYGEN SATURATION: 97 %

## 2024-09-03 DIAGNOSIS — M05.731 RHEUMATOID ARTHRITIS INVOLVING BOTH WRISTS WITH POSITIVE RHEUMATOID FACTOR: ICD-10-CM

## 2024-09-03 DIAGNOSIS — I10 ESSENTIAL HYPERTENSION: ICD-10-CM

## 2024-09-03 DIAGNOSIS — Z28.21 IMMUNIZATION REFUSED: ICD-10-CM

## 2024-09-03 DIAGNOSIS — J43.1 PANLOBULAR EMPHYSEMA: ICD-10-CM

## 2024-09-03 DIAGNOSIS — E11.9 TYPE 2 DIABETES MELLITUS WITHOUT COMPLICATION, WITHOUT LONG-TERM CURRENT USE OF INSULIN: Primary | ICD-10-CM

## 2024-09-03 DIAGNOSIS — M05.732 RHEUMATOID ARTHRITIS INVOLVING BOTH WRISTS WITH POSITIVE RHEUMATOID FACTOR: ICD-10-CM

## 2024-09-03 DIAGNOSIS — E78.5 DYSLIPIDEMIA: ICD-10-CM

## 2024-09-03 PROCEDURE — 99214 OFFICE O/P EST MOD 30 MIN: CPT | Performed by: FAMILY MEDICINE

## 2024-09-03 PROCEDURE — 1126F AMNT PAIN NOTED NONE PRSNT: CPT | Performed by: FAMILY MEDICINE

## 2024-09-03 PROCEDURE — 1160F RVW MEDS BY RX/DR IN RCRD: CPT | Performed by: FAMILY MEDICINE

## 2024-09-03 PROCEDURE — 1159F MED LIST DOCD IN RCRD: CPT | Performed by: FAMILY MEDICINE

## 2024-09-03 PROCEDURE — G2211 COMPLEX E/M VISIT ADD ON: HCPCS | Performed by: FAMILY MEDICINE

## 2024-09-03 PROCEDURE — 3074F SYST BP LT 130 MM HG: CPT | Performed by: FAMILY MEDICINE

## 2024-09-03 PROCEDURE — 3078F DIAST BP <80 MM HG: CPT | Performed by: FAMILY MEDICINE

## 2024-09-03 NOTE — PROGRESS NOTES
"    Follow Up Office Visit      Date: 2024   Patient Name: Lucio Castañeda  : 1940   MRN: [id number]     Chief Complaint:    Chief Complaint   Patient presents with    Follow-up     3 weeks        History of Present Illness: Lucio Castañeda is a 83 y.o. male who is here today for follow-up of his inflammatory arthritis.  Patient feels that he is doing better at present time.  He has discontinued the prednisone and feels that his symptoms are still stable.  He has been applying ice with relatively good success.  He does have a scheduled appointment with the rheumatologist.  Patient does not wish to pursue with continuing prednisone at present time.  He is tolerating his other medications as prescribed.  He has not had any other issues with side effects.  He believes that his wrist are pretty well back to \"baseline\" at present time.  Patient has agreed to see the rheumatologist.  Patient appears to be doing otherwise well.  They have continue with their medications without any side effects.  They have not had any changes in their usual activity, appetite and sleep.  Patient denies any other cardiovascular, respiratory, gastrointestinal, urologic or neurologic complaints.    Subjective      Review of Systems:   Review of Systems   Constitutional:  Negative for activity change, appetite change and fatigue.   Respiratory:  Negative for cough, chest tightness, shortness of breath and wheezing.    Cardiovascular:  Negative for chest pain, palpitations and leg swelling.   Gastrointestinal:  Negative for abdominal distention, abdominal pain, blood in stool, constipation, diarrhea, nausea, vomiting, GERD and indigestion.   Genitourinary:  Negative for difficulty urinating, dysuria, flank pain, frequency, hematuria and urgency.   Musculoskeletal:  Negative for arthralgias, back pain, gait problem, joint swelling and myalgias.   Neurological:  Negative for dizziness, tremors, seizures, syncope, weakness, " light-headedness, numbness, headache and memory problem.   Psychiatric/Behavioral:  Negative for sleep disturbance and depressed mood. The patient is not nervous/anxious.        I have reviewed the patients family history, social history, past medical history, past surgical history and have updated it as appropriate.     Medications:     Current Outpatient Medications:     amLODIPine (NORVASC) 10 MG tablet, TAKE ONE TABLET BY MOUTH EVERY DAY, Disp: 90 tablet, Rfl: 3    aspirin 81 MG tablet, Take 1 tablet by mouth Daily., Disp: , Rfl:     carvedilol (COREG) 12.5 MG tablet, TAKE 1 TABLET BY MOUTH 2 (TWO) TIMES A DAY WITH MEALS., Disp: 180 tablet, Rfl: 0    clopidogrel (PLAVIX) 75 MG tablet, Take 1 tablet by mouth Daily., Disp: 90 tablet, Rfl: 0    furosemide (Lasix) 20 MG tablet, Take 1 tablet by mouth Daily., Disp: 3 tablet, Rfl: 0    omeprazole (priLOSEC) 40 MG capsule, TAKE ONE CAPSULE BY MOUTH EVERY DAY, Disp: 90 capsule, Rfl: 3    simvastatin (ZOCOR) 40 MG tablet, TAKE 1 TABLET BY MOUTH DAILY, Disp: 90 tablet, Rfl: 3    Trelegy Ellipta 200-62.5-25 MCG/ACT inhaler, INHALE 1 PUFF BY MOUTH DAILY RINSE MOUTH AFTER EACH USE, Disp: 60 each, Rfl: 11    Allergies:   Allergies   Allergen Reactions    Morphine Hallucinations       Immunizations:   Immunization History   Administered Date(s) Administered    COVID-19 (MODERNA) 1st,2nd,3rd Dose Monovalent 03/16/2021, 04/14/2021, 11/16/2021    FLUAD TRI 65YR+ 11/15/2019    Flu Vaccine Quad PF >36MO 11/17/2020    Fluad Quad 65+ 11/17/2020    Fluzone (or Fluarix & Flulaval for VFC) >6mos 11/17/2020    Fluzone High-Dose 65+yrs 11/13/2023    Fluzone Quad >6mos (Multi-dose) 08/24/2016    Influenza Inj MDCK Preserative Free 08/24/2016    Influenza, Unspecified 11/16/2018    Pneumococcal Conjugate 13-Valent (PCV13) 08/24/2016    Pneumococcal Polysaccharide (PPSV23) 12/11/2006        Objective     Physical Exam: Please see above  Vital Signs:   Vitals:    09/03/24 1356   BP: 126/60  "  BP Location: Right arm   Patient Position: Sitting   Cuff Size: Adult   Pulse: 61   Resp: 18   Temp: 98.7 °F (37.1 °C)   TempSrc: Temporal   SpO2: 97%   Weight: 60.1 kg (132 lb 9.6 oz)   Height: 177.8 cm (70\")   PainSc: 0-No pain     Body mass index is 19.03 kg/m².  BMI is within normal parameters. No other follow-up for BMI required.       Physical Exam  Vitals and nursing note reviewed.   Constitutional:       Appearance: Normal appearance.   HENT:      Head: Normocephalic and atraumatic.      Nose: Nose normal.      Mouth/Throat:      Pharynx: Oropharynx is clear.   Eyes:      Extraocular Movements: Extraocular movements intact.      Pupils: Pupils are equal, round, and reactive to light.   Neck:      Thyroid: No thyroid mass or thyromegaly.      Trachea: Trachea normal.   Cardiovascular:      Rate and Rhythm: Normal rate and regular rhythm.      Pulses: Normal pulses. No decreased pulses.      Heart sounds: Normal heart sounds.   Pulmonary:      Effort: Pulmonary effort is normal.      Breath sounds: Normal breath sounds.   Abdominal:      General: Abdomen is flat. Bowel sounds are normal.      Palpations: Abdomen is soft.      Tenderness: There is no abdominal tenderness.   Musculoskeletal:      Cervical back: Neck supple.      Right lower leg: No edema.      Left lower leg: No edema.   Lymphadenopathy:      Cervical: No cervical adenopathy.   Skin:     General: Skin is warm and dry.   Neurological:      General: No focal deficit present.      Mental Status: He is alert and oriented to person, place, and time.      Sensory: Sensation is intact.      Motor: Motor function is intact.      Coordination: Coordination is intact.   Psychiatric:         Attention and Perception: Attention normal.         Mood and Affect: Mood normal.         Speech: Speech normal.         Behavior: Behavior normal.         Procedures    Results:   Labs:   Hemoglobin A1C   Date Value Ref Range Status   08/01/2024 6.70 (H) 4.80 - 5.60 " % Final     TSH   Date Value Ref Range Status   04/10/2024 1.350 0.270 - 4.200 uIU/mL Final        POCT Results (if applicable):   Results for orders placed or performed in visit on 08/01/24   Comprehensive Metabolic Panel    Specimen: Arm, Right; Blood   Result Value Ref Range    Glucose 128 (H) 65 - 99 mg/dL    BUN 26 (H) 8 - 23 mg/dL    Creatinine 1.24 0.76 - 1.27 mg/dL    Sodium 139 136 - 145 mmol/L    Potassium 4.0 3.5 - 5.2 mmol/L    Chloride 104 98 - 107 mmol/L    CO2 23.5 22.0 - 29.0 mmol/L    Calcium 8.9 8.6 - 10.5 mg/dL    Total Protein 7.0 6.0 - 8.5 g/dL    Albumin 3.7 3.5 - 5.2 g/dL    ALT (SGPT) 13 1 - 41 U/L    AST (SGOT) 14 1 - 40 U/L    Alkaline Phosphatase 31 (L) 39 - 117 U/L    Total Bilirubin 0.6 0.0 - 1.2 mg/dL    Globulin 3.3 gm/dL    A/G Ratio 1.1 g/dL    BUN/Creatinine Ratio 21.0 7.0 - 25.0    Anion Gap 11.5 5.0 - 15.0 mmol/L    eGFR 58.0 (L) >60.0 mL/min/1.73   Hemoglobin A1c    Specimen: Arm, Right; Blood   Result Value Ref Range    Hemoglobin A1C 6.70 (H) 4.80 - 5.60 %   Lipid Panel    Specimen: Arm, Right; Blood   Result Value Ref Range    Total Cholesterol 162 0 - 200 mg/dL    Triglycerides 90 0 - 150 mg/dL    HDL Cholesterol 50 40 - 60 mg/dL    LDL Cholesterol  95 0 - 100 mg/dL    VLDL Cholesterol 17 5 - 40 mg/dL    LDL/HDL Ratio 1.88    proBNP    Specimen: Arm, Right; Blood   Result Value Ref Range    proBNP 2,911.0 (H) 0.0 - 1,800.0 pg/mL   Sedimentation Rate    Specimen: Arm, Right; Blood   Result Value Ref Range    Sed Rate 17 0 - 20 mm/hr   C-reactive Protein    Specimen: Arm, Right; Blood   Result Value Ref Range    C-Reactive Protein 4.23 (H) 0.00 - 0.50 mg/dL   Rheumatoid Factor    Specimen: Arm, Right; Blood   Result Value Ref Range    Rheumatoid Factor Quantitative 16.4 (H) 0.0 - 14.0 IU/mL   TIERRA by IFA, Reflex 9-biomarkers profile    Specimen: Arm, Right; Blood   Result Value Ref Range    TIERRA Negative     Please note Comment    Cyclic Citrul Peptide Antibody, IgG / IgA     Specimen: Arm, Right; Blood   Result Value Ref Range    CCP Antibodies IgG/IgA 27 (H) 0 - 19 units   MicroAlbumin, Urine, Random - Urine, Clean Catch    Specimen: Urine, Clean Catch   Result Value Ref Range    Microalbumin, Urine 2.4 mg/dL   Urinalysis without microscopic (no culture) - Urine, Clean Catch    Specimen: Urine, Clean Catch   Result Value Ref Range    Color, UA Dark Yellow (A) Yellow, Straw    Appearance, UA Clear Clear    pH, UA 6.0 5.0 - 8.0    Specific Gravity, UA 1.026 1.005 - 1.030    Glucose,  mg/dL (Trace) (A) Negative    Ketones, UA Negative Negative    Bilirubin, UA Negative Negative    Blood, UA Negative Negative    Protein, UA 30 mg/dL (1+) (A) Negative    Leuk Esterase, UA Moderate (2+) (A) Negative    Nitrite, UA Negative Negative    Urobilinogen, UA 1.0 E.U./dL 0.2 - 1.0 E.U./dL       Imaging:   No valid procedures specified.     Measures:   Advanced Care Planning:   Patient has an advance directive in EMR which is still valid.     Smoking Cessation:   Non-smoker.    Assessment / Plan      Assessment/Plan:   Diagnoses and all orders for this visit:    1. Type 2 diabetes mellitus without complication, without long-term current use of insulin (Primary)   Patient does appear to be doing relatively well with respect to their diabetes.  They are tolerating their medications without complaints.  We will continue to follow their hemoglobin A1c and will make adjustments to keep their level less than 7%.  We will continue to monitor his blood sugars closely.  His blood sugars will probably increase with use of prednisone but hopefully we can avoid the use of prednisone in the future.    2. Essential hypertension   Patient appears to be tolerating their blood pressure medicine without any side effects.  We will continue to monitor their blood pressure and will adjust to keep there is systolic blood pressure less than 130.  We will continue to monitor renal function and will make adjustments  based on these findings.    3. 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  He does have positive laboratory data that does suggest that he does have underlying pathology including positive rheumatoid factor as well as CCP antibodies.  He understands that this could be a chronic condition that will occasionally cause flareups.  We will hold off on the prednisone at present time and we will just monitor his symptoms.  If he does showed worsening symptoms prior to his scheduled follow-up with the rheumatologist he will contact us and we will initiate a daily course of prednisone to his appointment.  Hopefully we can avoid the use of prednisone.    4. Dyslipidemia   Patient does appear to be tolerating their lipid-lowering agent without difficulty.  We will obtain the lipid profile as well as liver function test to observe for any abnormalities.  We will continue to adjust medications to keep their LDL less than 70.    5. Panlobular emphysema   Patient's breathing is doing well at present time.  He has not had any difficulty recently.  He has not had any shortness of breath or chronic cough.  We will make no changes at present time and we will continue to monitor his symptoms.  I do suspect that some of his improvement of his symptoms may be due to the chronic steroid use.  Nonetheless, we will continue to monitor and will treat accordingly.    6. Immunization refused   Patient would benefit from having immunizations given.  Patient has elected not to receive the recommended vaccines at present time.  They understand the risk of not receiving these vaccine and the disease in which they may incur.  We have discussed other options and will pursue with encouragement of compliance with future appointments.  If patient does change their minds prior to the next scheduled follow-up, they may contact us and we will provide immunization at  that time.      Follow Up:   Return in about 8 weeks (around 11/1/2024).      At [facility], we believe that sharing information builds trust and better relationships. You are receiving this note because you recently visited [facility]. It is possible you will see health information before a provider has talked with you about it. This kind of information can be easy to misunderstand. To help you fully understand what it means for your health, we urge you to discuss this note with your provider.    Rocky Morel MD  [facility]

## 2024-09-23 ENCOUNTER — TELEPHONE (OUTPATIENT)
Dept: FAMILY MEDICINE CLINIC | Facility: CLINIC | Age: 84
End: 2024-09-23
Payer: MEDICARE

## 2024-09-23 NOTE — TELEPHONE ENCOUNTER
Relay     Message left for Dr Edenilson Smith's office, following up on current referral appointment status. Request to have completed consultation note be fax to pcp office.

## 2024-10-24 ENCOUNTER — TELEPHONE (OUTPATIENT)
Dept: FAMILY MEDICINE CLINIC | Facility: CLINIC | Age: 84
End: 2024-10-24
Payer: MEDICARE

## 2024-10-24 NOTE — TELEPHONE ENCOUNTER
"Caller: Lucio Castañeda \"Pito\"    Relationship: Self    Best call back number: [phone]     What medication are you requesting: MEDICATION FOR SINUS INFECTION    What are your current symptoms: RUNNY NOSE, WATERY EYES, THROAT PAIN    How long have you been experiencing symptoms: 4 DAYS    If a prescription is needed, what is your preferred pharmacy and phone number: [id number] - [place], [place] - [address] [place] [address] [phone] [facility] [phone] [facility]   "

## 2024-10-31 ENCOUNTER — OFFICE VISIT (OUTPATIENT)
Dept: FAMILY MEDICINE CLINIC | Facility: CLINIC | Age: 84
End: 2024-10-31
Payer: MEDICARE

## 2024-10-31 ENCOUNTER — LAB (OUTPATIENT)
Dept: FAMILY MEDICINE CLINIC | Facility: CLINIC | Age: 84
End: 2024-10-31
Payer: MEDICARE

## 2024-10-31 VITALS
HEART RATE: 57 BPM | OXYGEN SATURATION: 97 % | RESPIRATION RATE: 16 BRPM | DIASTOLIC BLOOD PRESSURE: 80 MMHG | SYSTOLIC BLOOD PRESSURE: 160 MMHG | WEIGHT: 124.2 LBS | BODY MASS INDEX: 17.78 KG/M2 | HEIGHT: 70 IN | TEMPERATURE: 97.7 F

## 2024-10-31 DIAGNOSIS — I10 ESSENTIAL HYPERTENSION: ICD-10-CM

## 2024-10-31 DIAGNOSIS — E11.9 TYPE 2 DIABETES MELLITUS WITHOUT COMPLICATION, WITHOUT LONG-TERM CURRENT USE OF INSULIN: Primary | ICD-10-CM

## 2024-10-31 DIAGNOSIS — M05.732 RHEUMATOID ARTHRITIS INVOLVING BOTH WRISTS WITH POSITIVE RHEUMATOID FACTOR: ICD-10-CM

## 2024-10-31 DIAGNOSIS — E78.5 DYSLIPIDEMIA: ICD-10-CM

## 2024-10-31 DIAGNOSIS — M05.731 RHEUMATOID ARTHRITIS INVOLVING BOTH WRISTS WITH POSITIVE RHEUMATOID FACTOR: ICD-10-CM

## 2024-10-31 DIAGNOSIS — R06.02 SHORTNESS OF BREATH: ICD-10-CM

## 2024-10-31 DIAGNOSIS — Z28.21 IMMUNIZATION REFUSED: ICD-10-CM

## 2024-10-31 DIAGNOSIS — J43.1 PANLOBULAR EMPHYSEMA: ICD-10-CM

## 2024-10-31 DIAGNOSIS — E11.9 TYPE 2 DIABETES MELLITUS WITHOUT COMPLICATION, WITHOUT LONG-TERM CURRENT USE OF INSULIN: ICD-10-CM

## 2024-10-31 LAB
DEPRECATED RDW RBC AUTO: 41.5 FL (ref 37–54)
ERYTHROCYTE [DISTWIDTH] IN BLOOD BY AUTOMATED COUNT: 13.6 % (ref 12.3–15.4)
ERYTHROCYTE [SEDIMENTATION RATE] IN BLOOD: 6 MM/HR (ref 0–20)
HBA1C MFR BLD: 7.8 % (ref 4.8–5.6)
HCT VFR BLD AUTO: 44.6 % (ref 37.5–51)
HGB BLD-MCNC: 15.1 G/DL (ref 13–17.7)
MCH RBC QN AUTO: 28.8 PG (ref 26.6–33)
MCHC RBC AUTO-ENTMCNC: 33.9 G/DL (ref 31.5–35.7)
MCV RBC AUTO: 85.1 FL (ref 79–97)
PLATELET # BLD AUTO: 285 10*3/MM3 (ref 140–450)
PMV BLD AUTO: 9.9 FL (ref 6–12)
RBC # BLD AUTO: 5.24 10*6/MM3 (ref 4.14–5.8)
WBC NRBC COR # BLD AUTO: 11.54 10*3/MM3 (ref 3.4–10.8)

## 2024-10-31 PROCEDURE — 85652 RBC SED RATE AUTOMATED: CPT | Performed by: FAMILY MEDICINE

## 2024-10-31 PROCEDURE — 36415 COLL VENOUS BLD VENIPUNCTURE: CPT | Performed by: FAMILY MEDICINE

## 2024-10-31 PROCEDURE — 85027 COMPLETE CBC AUTOMATED: CPT | Performed by: FAMILY MEDICINE

## 2024-10-31 PROCEDURE — 83880 ASSAY OF NATRIURETIC PEPTIDE: CPT | Performed by: FAMILY MEDICINE

## 2024-10-31 PROCEDURE — 80053 COMPREHEN METABOLIC PANEL: CPT | Performed by: FAMILY MEDICINE

## 2024-10-31 PROCEDURE — 86140 C-REACTIVE PROTEIN: CPT | Performed by: FAMILY MEDICINE

## 2024-10-31 PROCEDURE — 83036 HEMOGLOBIN GLYCOSYLATED A1C: CPT | Performed by: FAMILY MEDICINE

## 2024-10-31 RX ORDER — LEFLUNOMIDE 10 MG/1
TABLET ORAL
COMMUNITY
Start: 2024-10-04

## 2024-10-31 RX ORDER — PREDNISONE 10 MG/1
TABLET ORAL
COMMUNITY
Start: 2024-10-21 | End: 2024-10-31 | Stop reason: SDUPTHER

## 2024-10-31 RX ORDER — PREDNISONE 5 MG/1
5 TABLET ORAL DAILY
Qty: 30 TABLET | Refills: 1 | Status: SHIPPED | OUTPATIENT
Start: 2024-10-31

## 2024-10-31 NOTE — PROGRESS NOTES
Follow Up Office Visit      Date: 10/31/2024   Patient Name: Lucio Castañeda  : 1940   MRN: [id number]     Chief Complaint:    Chief Complaint   Patient presents with    Follow-up     8 week.    Diabetes    Rheumatoid Arthritis    Hypertension       History of Present Illness: Lucio Castañeda is a 83 y.o. male who is here today for follow-up.  Patient has been doing relatively well since last being seen.  Patient did have symptoms of an acute sinusitis.  He was started on Augmentin and he does appear to be doing better at present time.  Patient has not had any other concerns currently.  He is doing better with respect to his rheumatoid arthritis as he is taking the Muse without complaints.  He is taking 10 mg of prednisone without difficulty.  Otherwise he continues with his other medication as prescribed.  He has not had any worsening respiratory symptoms and has not required any short acting metered-dose inhaler.  He has no concerns at present time.  Patient appears to be doing otherwise well.  They have continue with their medications without any side effects.  They have not had any changes in their usual activity, appetite and sleep.  Patient denies any other cardiovascular, respiratory, gastrointestinal, urologic or neurologic complaints.    History of Present Illness         Subjective      Review of Systems:   Review of Systems   Constitutional:  Negative for activity change, appetite change and fatigue.   Respiratory:  Negative for cough, chest tightness, shortness of breath and wheezing.    Cardiovascular:  Negative for chest pain, palpitations and leg swelling.   Gastrointestinal:  Negative for abdominal distention, abdominal pain, blood in stool, constipation, diarrhea, nausea, vomiting, GERD and indigestion.   Genitourinary:  Negative for difficulty urinating, dysuria, flank pain, frequency, hematuria and urgency.   Musculoskeletal:  Negative for arthralgias, back pain, gait problem, joint  swelling and myalgias.   Neurological:  Negative for dizziness, tremors, seizures, syncope, weakness, light-headedness, numbness, headache and memory problem.   Psychiatric/Behavioral:  Negative for sleep disturbance and depressed mood. The patient is not nervous/anxious.        I have reviewed the patients family history, social history, past medical history, past surgical history and have updated it as appropriate.     Medications:     Current Outpatient Medications:     amLODIPine (NORVASC) 10 MG tablet, TAKE ONE TABLET BY MOUTH EVERY DAY, Disp: 90 tablet, Rfl: 3    amoxicillin-clavulanate (AUGMENTIN) 875-125 MG per tablet, Take 1 tablet by mouth 2 (Two) Times a Day., Disp: 20 tablet, Rfl: 0    aspirin 81 MG tablet, Take 1 tablet by mouth Daily., Disp: , Rfl:     carvedilol (COREG) 12.5 MG tablet, TAKE 1 TABLET BY MOUTH 2 (TWO) TIMES A DAY WITH MEALS., Disp: 180 tablet, Rfl: 0    clopidogrel (PLAVIX) 75 MG tablet, Take 1 tablet by mouth Daily., Disp: 90 tablet, Rfl: 0    furosemide (Lasix) 20 MG tablet, Take 1 tablet by mouth Daily., Disp: 3 tablet, Rfl: 0    leflunomide (ARAVA) 10 MG tablet, , Disp: , Rfl:     omeprazole (priLOSEC) 40 MG capsule, TAKE ONE CAPSULE BY MOUTH EVERY DAY, Disp: 90 capsule, Rfl: 3    predniSONE (DELTASONE) 5 MG tablet, Take 1 tablet by mouth Daily. For 3 weeks then stop., Disp: 30 tablet, Rfl: 1    simvastatin (ZOCOR) 40 MG tablet, TAKE 1 TABLET BY MOUTH DAILY, Disp: 90 tablet, Rfl: 3    Trelegy Ellipta 200-62.5-25 MCG/ACT inhaler, INHALE 1 PUFF BY MOUTH DAILY RINSE MOUTH AFTER EACH USE, Disp: 60 each, Rfl: 11    Allergies:   Allergies   Allergen Reactions    Morphine Hallucinations       Immunizations:   Immunization History   Administered Date(s) Administered    COVID-19 (MODERNA) 1st,2nd,3rd Dose Monovalent 03/16/2021, 04/14/2021, 11/16/2021    FLUAD TRI 65YR+ 11/15/2019    Flu Vaccine Quad PF >36MO 11/17/2020    Fluad Quad 65+ 11/17/2020    Fluzone (or Fluarix & Flulaval for VFC)  ">6mos 11/17/2020    Fluzone High-Dose 65+yrs 11/13/2023    Fluzone Quad >6mos (Multi-dose) 08/24/2016    Influenza Inj MDCK Preserative Free 08/24/2016    Influenza, Unspecified 11/16/2018    Pneumococcal Conjugate 13-Valent (PCV13) 08/24/2016    Pneumococcal Polysaccharide (PPSV23) 12/11/2006        Objective     Physical Exam: Please see above  Vital Signs:   Vitals:    10/31/24 1439   BP: 160/80   BP Location: Left arm   Patient Position: Sitting   Cuff Size: Adult   Pulse: 57   Resp: 16   Temp: 97.7 °F (36.5 °C)   TempSrc: Temporal   SpO2: 97%   Weight: 56.3 kg (124 lb 3.2 oz)   Height: 177.8 cm (70\")     Body mass index is 17.82 kg/m².          Physical Exam  Vitals and nursing note reviewed.   Constitutional:       Appearance: Normal appearance.   HENT:      Head: Normocephalic and atraumatic.      Nose: Nose normal.      Mouth/Throat:      Pharynx: Oropharynx is clear.   Eyes:      Extraocular Movements: Extraocular movements intact.      Pupils: Pupils are equal, round, and reactive to light.   Neck:      Thyroid: No thyroid mass or thyromegaly.      Trachea: Trachea normal.   Cardiovascular:      Rate and Rhythm: Normal rate and regular rhythm.      Pulses: Normal pulses. No decreased pulses.      Heart sounds: Normal heart sounds.   Pulmonary:      Effort: Pulmonary effort is normal.      Breath sounds: Normal breath sounds.   Abdominal:      General: Abdomen is flat. Bowel sounds are normal.      Palpations: Abdomen is soft.      Tenderness: There is no abdominal tenderness.   Musculoskeletal:      Cervical back: Neck supple.      Right lower leg: No edema.      Left lower leg: No edema.   Lymphadenopathy:      Cervical: No cervical adenopathy.   Skin:     General: Skin is warm and dry.   Neurological:      General: No focal deficit present.      Mental Status: He is alert and oriented to person, place, and time.      Sensory: Sensation is intact.      Motor: Motor function is intact.      Coordination: " Coordination is intact.   Psychiatric:         Attention and Perception: Attention normal.         Mood and Affect: Mood normal.         Speech: Speech normal.         Behavior: Behavior normal.         Procedures    Results:   Labs:   Hemoglobin A1C   Date Value Ref Range Status   08/01/2024 6.70 (H) 4.80 - 5.60 % Final     TSH   Date Value Ref Range Status   04/10/2024 1.350 0.270 - 4.200 uIU/mL Final        POCT Results (if applicable):   Results for orders placed or performed in visit on 08/01/24   Comprehensive Metabolic Panel    Collection Time: 08/01/24 11:41 AM    Specimen: Arm, Right; Blood   Result Value Ref Range    Glucose 128 (H) 65 - 99 mg/dL    BUN 26 (H) 8 - 23 mg/dL    Creatinine 1.24 0.76 - 1.27 mg/dL    Sodium 139 136 - 145 mmol/L    Potassium 4.0 3.5 - 5.2 mmol/L    Chloride 104 98 - 107 mmol/L    CO2 23.5 22.0 - 29.0 mmol/L    Calcium 8.9 8.6 - 10.5 mg/dL    Total Protein 7.0 6.0 - 8.5 g/dL    Albumin 3.7 3.5 - 5.2 g/dL    ALT (SGPT) 13 1 - 41 U/L    AST (SGOT) 14 1 - 40 U/L    Alkaline Phosphatase 31 (L) 39 - 117 U/L    Total Bilirubin 0.6 0.0 - 1.2 mg/dL    Globulin 3.3 gm/dL    A/G Ratio 1.1 g/dL    BUN/Creatinine Ratio 21.0 7.0 - 25.0    Anion Gap 11.5 5.0 - 15.0 mmol/L    eGFR 58.0 (L) >60.0 mL/min/1.73   Hemoglobin A1c    Collection Time: 08/01/24 11:41 AM    Specimen: Arm, Right; Blood   Result Value Ref Range    Hemoglobin A1C 6.70 (H) 4.80 - 5.60 %   Lipid Panel    Collection Time: 08/01/24 11:41 AM    Specimen: Arm, Right; Blood   Result Value Ref Range    Total Cholesterol 162 0 - 200 mg/dL    Triglycerides 90 0 - 150 mg/dL    HDL Cholesterol 50 40 - 60 mg/dL    LDL Cholesterol  95 0 - 100 mg/dL    VLDL Cholesterol 17 5 - 40 mg/dL    LDL/HDL Ratio 1.88    proBNP    Collection Time: 08/01/24 11:41 AM    Specimen: Arm, Right; Blood   Result Value Ref Range    proBNP 2,911.0 (H) 0.0 - 1,800.0 pg/mL   Sedimentation Rate    Collection Time: 08/01/24 11:41 AM    Specimen: Arm, Right; Blood    Result Value Ref Range    Sed Rate 17 0 - 20 mm/hr   C-reactive Protein    Collection Time: 08/01/24 11:41 AM    Specimen: Arm, Right; Blood   Result Value Ref Range    C-Reactive Protein 4.23 (H) 0.00 - 0.50 mg/dL   Rheumatoid Factor    Collection Time: 08/01/24 11:41 AM    Specimen: Arm, Right; Blood   Result Value Ref Range    Rheumatoid Factor Quantitative 16.4 (H) 0.0 - 14.0 IU/mL   TIERRA by IFA, Reflex 9-biomarkers profile    Collection Time: 08/01/24 11:41 AM    Specimen: Arm, Right; Blood   Result Value Ref Range    TIERRA Negative     Please note Comment    Cyclic Citrul Peptide Antibody, IgG / IgA    Collection Time: 08/01/24 11:41 AM    Specimen: Arm, Right; Blood   Result Value Ref Range    CCP Antibodies IgG/IgA 27 (H) 0 - 19 units   MicroAlbumin, Urine, Random - Urine, Clean Catch    Collection Time: 08/01/24 11:49 AM    Specimen: Urine, Clean Catch   Result Value Ref Range    Microalbumin, Urine 2.4 mg/dL   Urinalysis without microscopic (no culture) - Urine, Clean Catch    Collection Time: 08/01/24 11:49 AM    Specimen: Urine, Clean Catch   Result Value Ref Range    Color, UA Dark Yellow (A) Yellow, Straw    Appearance, UA Clear Clear    pH, UA 6.0 5.0 - 8.0    Specific Gravity, UA 1.026 1.005 - 1.030    Glucose,  mg/dL (Trace) (A) Negative    Ketones, UA Negative Negative    Bilirubin, UA Negative Negative    Blood, UA Negative Negative    Protein, UA 30 mg/dL (1+) (A) Negative    Leuk Esterase, UA Moderate (2+) (A) Negative    Nitrite, UA Negative Negative    Urobilinogen, UA 1.0 E.U./dL 0.2 - 1.0 E.U./dL       Imaging:   No valid procedures specified.     Measures:   Advanced Care Planning:   Patient has an advance directive in EMR which is still valid.     Smoking Cessation:   Non-smoker.    Assessment / Plan      Assessment/Plan:   Diagnoses and all orders for this visit:    1. Type 2 diabetes mellitus without complication, without long-term current use of insulin (Primary)  Patient does  appear to be doing relatively well with respect to their diabetes.  They are tolerating their medications without complaints.  We will continue to follow their hemoglobin A1c and will make adjustments to keep their level less than 7%.  -     Hemoglobin A1c; Future    2. Essential hypertension  Patient appears to be tolerating their blood pressure medicine without any side effects.  We will continue to monitor their blood pressure and will adjust to keep there is systolic blood pressure less than 130.  We will continue to monitor renal function and will make adjustments based on these findings.  -     Comprehensive Metabolic Panel; Future    3. 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  He is tolerating the Arava as well as prednisone without difficulty.  We have discussed that we will start weaning him off the prednisone and if he has other issues or concerns further assessment treatment will be necessary.  -     Sedimentation Rate; Future  -     C-reactive Protein; Future  -     CBC (No Diff); Future  -     predniSONE (DELTASONE) 5 MG tablet; Take 1 tablet by mouth Daily. For 3 weeks then stop.  Dispense: 30 tablet; Refill: 1    4. Dyslipidemia   Patient does appear to be tolerating their lipid-lowering agent without difficulty.  We will obtain the lipid profile as well as liver function test to observe for any abnormalities.  We will continue to adjust medications to keep their LDL less than 70.    5. Panlobular emphysema   Patient has been doing well with respect to his emphysema.  He is using his maintenance medications without complaints.  He rarely uses his short acting.  He feels he is doing well at present time and does not wish to change any regiment currently.  If he does have worsening complaints further assessment treatment will be necessary.    6. Immunization refused   Patient would  benefit from having immunizations given.  Patient has elected not to receive the recommended vaccines at present time.  They understand the risk of not receiving these vaccine and the disease in which they may incur.  We have discussed other options and will pursue with encouragement of compliance with future appointments.  If patient does change their minds prior to the next scheduled follow-up, they may contact us and we will provide immunization at that time.    7. Shortness of breath  Patient had history of having heart failure in the past.  He was taken diuretic and feels like he is doing better at present time.  Nonetheless, we will recheck a BMP and will monitor.  If he has other issues prior to his next scheduled follow-up he will contact us.  -     proBNP; Future        Follow Up:   Return in about 3 months (around 1/31/2025).      At [facility], we believe that sharing information builds trust and better relationships. You are receiving this note because you recently visited [facility]. It is possible you will see health information before a provider has talked with you about it. This kind of information can be easy to misunderstand. To help you fully understand what it means for your health, we urge you to discuss this note with your provider.    Rocky Morel MD  [facility]

## 2024-11-01 ENCOUNTER — TELEPHONE (OUTPATIENT)
Dept: FAMILY MEDICINE CLINIC | Facility: CLINIC | Age: 84
End: 2024-11-01
Payer: MEDICARE

## 2024-11-01 LAB
ALBUMIN SERPL-MCNC: 3.7 G/DL (ref 3.5–5.2)
ALBUMIN/GLOB SERPL: 1 G/DL
ALP SERPL-CCNC: 36 U/L (ref 39–117)
ALT SERPL W P-5'-P-CCNC: 8 U/L (ref 1–41)
ANION GAP SERPL CALCULATED.3IONS-SCNC: 15.1 MMOL/L (ref 5–15)
AST SERPL-CCNC: 17 U/L (ref 1–40)
BILIRUB SERPL-MCNC: 0.7 MG/DL (ref 0–1.2)
BUN SERPL-MCNC: 22 MG/DL (ref 8–23)
BUN/CREAT SERPL: 20 (ref 7–25)
CALCIUM SPEC-SCNC: 8.9 MG/DL (ref 8.6–10.5)
CHLORIDE SERPL-SCNC: 99 MMOL/L (ref 98–107)
CO2 SERPL-SCNC: 26.9 MMOL/L (ref 22–29)
CREAT SERPL-MCNC: 1.1 MG/DL (ref 0.76–1.27)
CRP SERPL-MCNC: 0.69 MG/DL (ref 0–0.5)
EGFRCR SERPLBLD CKD-EPI 2021: 67 ML/MIN/1.73
GLOBULIN UR ELPH-MCNC: 3.7 GM/DL
GLUCOSE SERPL-MCNC: 97 MG/DL (ref 65–99)
NT-PROBNP SERPL-MCNC: 3057 PG/ML (ref 0–1800)
POTASSIUM SERPL-SCNC: 3.2 MMOL/L (ref 3.5–5.2)
PROT SERPL-MCNC: 7.4 G/DL (ref 6–8.5)
SODIUM SERPL-SCNC: 141 MMOL/L (ref 136–145)

## 2024-11-01 NOTE — TELEPHONE ENCOUNTER
"PT WAS INFORMED OF PCP'S FEEDBACK ABOUT LAB RESULTS:   \"Kidney function has greatly improved.  Potassium is little bit on the low side.  He needs to try to eat foods that contain potassium.  We will not making changes at present time.  We will plan on rechecking labs when he returns to clinic.  Continue to push fluids and avoid anti-inflammatories.\"  "

## 2024-11-13 NOTE — DISCHARGE INSTRUCTIONS
Take antibiotics as prescribed  Please drink fluids to stay hydrated  Follow-up with primary care provider to monitor for resolution, repeat kidney function, and monitoring of lung nodule   36.7

## 2024-12-23 RX ORDER — CARVEDILOL 12.5 MG/1
12.5 TABLET ORAL 2 TIMES DAILY
Qty: 180 TABLET | Refills: 0 | Status: SHIPPED | OUTPATIENT
Start: 2024-12-23

## 2025-01-09 DIAGNOSIS — I25.9 IHD (ISCHEMIC HEART DISEASE): ICD-10-CM

## 2025-01-09 RX ORDER — CLOPIDOGREL BISULFATE 75 MG/1
75 TABLET ORAL DAILY
Qty: 90 TABLET | Refills: 3 | Status: SHIPPED | OUTPATIENT
Start: 2025-01-09

## 2025-01-09 NOTE — TELEPHONE ENCOUNTER
Rx Refill Note  Requested Prescriptions     Pending Prescriptions Disp Refills    clopidogrel (PLAVIX) 75 MG tablet [Pharmacy Med Name: CLOPIDOGREL 75 MG TABLET 75 Tablet] 90 tablet 0     Sig: TAKE ONE TABLET BY MOUTH DAILY      Last office visit with prescribing clinician: 10/31/2024   Last telemedicine visit with prescribing clinician: Visit date not found   Next office visit with prescribing clinician: 2/3/2025                         Would you like a call back once the refill request has been completed: [] Yes [] No    If the office needs to give you a call back, can they leave a voicemail: [] Yes [] No    Imelda Shah MA  01/09/25, 10:31 EST

## 2025-01-25 RX ORDER — SIMVASTATIN 40 MG
40 TABLET ORAL DAILY
Qty: 90 TABLET | Refills: 2 | Status: SHIPPED | OUTPATIENT
Start: 2025-01-25

## 2025-02-03 ENCOUNTER — OFFICE VISIT (OUTPATIENT)
Dept: FAMILY MEDICINE CLINIC | Facility: CLINIC | Age: 85
End: 2025-02-03
Payer: MEDICARE

## 2025-02-03 ENCOUNTER — LAB (OUTPATIENT)
Dept: FAMILY MEDICINE CLINIC | Facility: CLINIC | Age: 85
End: 2025-02-03
Payer: MEDICARE

## 2025-02-03 VITALS
TEMPERATURE: 97.6 F | WEIGHT: 131 LBS | HEART RATE: 62 BPM | DIASTOLIC BLOOD PRESSURE: 82 MMHG | SYSTOLIC BLOOD PRESSURE: 158 MMHG | HEIGHT: 70 IN | OXYGEN SATURATION: 94 % | BODY MASS INDEX: 18.75 KG/M2

## 2025-02-03 DIAGNOSIS — R06.02 SHORTNESS OF BREATH: ICD-10-CM

## 2025-02-03 DIAGNOSIS — Z28.21 IMMUNIZATION REFUSED: ICD-10-CM

## 2025-02-03 DIAGNOSIS — E87.6 HYPOKALEMIA: ICD-10-CM

## 2025-02-03 DIAGNOSIS — J43.1 PANLOBULAR EMPHYSEMA: ICD-10-CM

## 2025-02-03 DIAGNOSIS — I10 ESSENTIAL HYPERTENSION: ICD-10-CM

## 2025-02-03 DIAGNOSIS — I25.9 IHD (ISCHEMIC HEART DISEASE): ICD-10-CM

## 2025-02-03 DIAGNOSIS — E11.9 TYPE 2 DIABETES MELLITUS WITHOUT COMPLICATION, WITHOUT LONG-TERM CURRENT USE OF INSULIN: ICD-10-CM

## 2025-02-03 DIAGNOSIS — E78.5 DYSLIPIDEMIA: ICD-10-CM

## 2025-02-03 DIAGNOSIS — E11.9 TYPE 2 DIABETES MELLITUS WITHOUT COMPLICATION, WITHOUT LONG-TERM CURRENT USE OF INSULIN: Primary | ICD-10-CM

## 2025-02-03 LAB — HBA1C MFR BLD: 6.6 % (ref 4.8–5.6)

## 2025-02-03 PROCEDURE — 99214 OFFICE O/P EST MOD 30 MIN: CPT | Performed by: FAMILY MEDICINE

## 2025-02-03 PROCEDURE — 36415 COLL VENOUS BLD VENIPUNCTURE: CPT | Performed by: FAMILY MEDICINE

## 2025-02-03 PROCEDURE — 1159F MED LIST DOCD IN RCRD: CPT | Performed by: FAMILY MEDICINE

## 2025-02-03 PROCEDURE — 80053 COMPREHEN METABOLIC PANEL: CPT | Performed by: FAMILY MEDICINE

## 2025-02-03 PROCEDURE — 1126F AMNT PAIN NOTED NONE PRSNT: CPT | Performed by: FAMILY MEDICINE

## 2025-02-03 PROCEDURE — 83036 HEMOGLOBIN GLYCOSYLATED A1C: CPT | Performed by: FAMILY MEDICINE

## 2025-02-03 PROCEDURE — 3077F SYST BP >= 140 MM HG: CPT | Performed by: FAMILY MEDICINE

## 2025-02-03 PROCEDURE — 83880 ASSAY OF NATRIURETIC PEPTIDE: CPT | Performed by: FAMILY MEDICINE

## 2025-02-03 PROCEDURE — 1160F RVW MEDS BY RX/DR IN RCRD: CPT | Performed by: FAMILY MEDICINE

## 2025-02-03 PROCEDURE — 3079F DIAST BP 80-89 MM HG: CPT | Performed by: FAMILY MEDICINE

## 2025-02-03 NOTE — PROGRESS NOTES
Follow Up Office Visit      Date: 2025   Patient Name: Lucio Castañeda  : 1940   MRN: [id number]     Chief Complaint:    Chief Complaint   Patient presents with    Follow-up     lab       History of Present Illness: Lucio Castañeda is a 84 y.o. male who is here today for follow-up.    History of Present Illness  The patient is an 84-year-old male who presents for evaluation of hypertension, heart failure, diabetes mellitus, and arthritis.    He reports a satisfactory response to his current antihypertensive regimen, which includes amlodipine and carvedilol.    He has been experiencing increased urinary frequency, particularly at night, which he attributes to his diuretic medication, furosemide, taken daily. He also reports a recent flare-up of his arthritis, necessitating the reintroduction of prednisone at a dose of 5 mg. Despite this, he reports no current pain or swelling and expresses satisfaction with his arthritis management. His physician has imposed a weight limit of 25 pounds on his lifting activities. He has been on a continuous course of steroids since the onset of his arthritis symptoms.    He maintains good respiratory function with the use of his inhaler and does not require short-acting bronchodilators. He recalls an incident where he sustained an eye injury from a chainsaw, but reports no corneal damage. He is unable to wear his glasses due to discomfort.    He has declined the shingles and RSV vaccines.    MEDICATIONS  amlodipine, carvedilol, furosemide, prednisone         Patient appears to be doing otherwise well.  They have continue with their medications without any side effects.  They have not had any changes in their usual activity, appetite and sleep.  Patient denies any other cardiovascular, respiratory, gastrointestinal, urologic or neurologic complaints.    Subjective      Review of Systems:   Review of Systems   Constitutional:  Negative for activity change, appetite  change and fatigue.   Respiratory:  Negative for cough, chest tightness, shortness of breath and wheezing.    Cardiovascular:  Negative for chest pain, palpitations and leg swelling.   Gastrointestinal:  Negative for abdominal distention, abdominal pain, blood in stool, constipation, diarrhea, nausea, vomiting, GERD and indigestion.   Genitourinary:  Negative for difficulty urinating, dysuria, flank pain, frequency, hematuria and urgency.   Musculoskeletal:  Negative for arthralgias, back pain, gait problem, joint swelling and myalgias.   Neurological:  Negative for dizziness, tremors, seizures, syncope, weakness, light-headedness, numbness, headache and memory problem.   Psychiatric/Behavioral:  Negative for sleep disturbance and depressed mood. The patient is not nervous/anxious.        I have reviewed the patients family history, social history, past medical history, past surgical history and have updated it as appropriate.     Medications:     Current Outpatient Medications:     amLODIPine (NORVASC) 10 MG tablet, TAKE ONE TABLET BY MOUTH EVERY DAY, Disp: 90 tablet, Rfl: 3    aspirin 81 MG tablet, Take 1 tablet by mouth Daily., Disp: , Rfl:     carvedilol (COREG) 12.5 MG tablet, TAKE ONE TABLET BY MOUTH TWO TIMES A DAY, Disp: 180 tablet, Rfl: 0    clopidogrel (PLAVIX) 75 MG tablet, TAKE ONE TABLET BY MOUTH DAILY, Disp: 90 tablet, Rfl: 3    furosemide (Lasix) 20 MG tablet, Take 1 tablet by mouth Daily., Disp: 3 tablet, Rfl: 0    omeprazole (priLOSEC) 40 MG capsule, TAKE ONE CAPSULE BY MOUTH EVERY DAY, Disp: 90 capsule, Rfl: 3    predniSONE (DELTASONE) 5 MG tablet, Take 1 tablet by mouth Daily. For 3 weeks then stop. (Patient taking differently: Take 1 tablet by mouth Daily. Per rheumatologist), Disp: 30 tablet, Rfl: 1    simvastatin (ZOCOR) 40 MG tablet, TAKE ONE TABLET BY MOUTH EVERY DAY, Disp: 90 tablet, Rfl: 2    Trelegy Ellipta 200-62.5-25 MCG/ACT inhaler, INHALE 1 PUFF BY MOUTH DAILY RINSE MOUTH AFTER EACH  "USE, Disp: 60 each, Rfl: 11    leflunomide (ARAVA) 10 MG tablet, , Disp: , Rfl:     Allergies:   Allergies   Allergen Reactions    Morphine Hallucinations       Immunizations:   Immunization History   Administered Date(s) Administered    COVID-19 (MODERNA) 1st,2nd,3rd Dose Monovalent 03/16/2021, 04/14/2021, 11/16/2021    FLUAD TRI 65YR+ 11/15/2019    Flu Vaccine Quad PF >36MO 11/17/2020    Fluad Quad 65+ 11/17/2020    Fluzone (or Fluarix & Flulaval for VFC) >6mos 11/17/2020    Fluzone High-Dose 65+yrs 11/13/2023    Fluzone Quad >6mos (Multi-dose) 08/24/2016    Influenza Inj MDCK Preserative Free 08/24/2016    Influenza, Unspecified 11/16/2018    Pneumococcal Conjugate 13-Valent (PCV13) 08/24/2016    Pneumococcal Polysaccharide (PPSV23) 12/11/2006        Objective     Physical Exam: Please see above  Vital Signs:   Vitals:    02/03/25 1509   BP: 158/82   BP Location: Left arm   Patient Position: Sitting   Cuff Size: Adult   Pulse: 62   Temp: 97.6 °F (36.4 °C)   TempSrc: Temporal   SpO2: 94%   Weight: 59.4 kg (131 lb)   Height: 177.8 cm (70\")     Body mass index is 18.8 kg/m².  BMI is within normal parameters. No other follow-up for BMI required.       Physical Exam  Vitals and nursing note reviewed.   Constitutional:       Appearance: Normal appearance.   HENT:      Head: Normocephalic and atraumatic.      Nose: Nose normal.      Mouth/Throat:      Pharynx: Oropharynx is clear.   Eyes:      Extraocular Movements: Extraocular movements intact.      Pupils: Pupils are equal, round, and reactive to light.   Neck:      Thyroid: No thyroid mass or thyromegaly.      Trachea: Trachea normal.   Cardiovascular:      Rate and Rhythm: Normal rate and regular rhythm.      Pulses: Normal pulses. No decreased pulses.      Heart sounds: Normal heart sounds.   Pulmonary:      Effort: Pulmonary effort is normal.      Breath sounds: Normal breath sounds.   Abdominal:      General: Abdomen is flat. Bowel sounds are normal.      " Palpations: Abdomen is soft.      Tenderness: There is no abdominal tenderness.   Musculoskeletal:      Cervical back: Neck supple.      Right lower leg: No edema.      Left lower leg: No edema.   Lymphadenopathy:      Cervical: No cervical adenopathy.   Skin:     General: Skin is warm and dry.   Neurological:      General: No focal deficit present.      Mental Status: He is alert and oriented to person, place, and time.      Sensory: Sensation is intact.      Motor: Motor function is intact.      Coordination: Coordination is intact.   Psychiatric:         Attention and Perception: Attention normal.         Mood and Affect: Mood normal.         Speech: Speech normal.         Behavior: Behavior normal.         Procedures    Results:   Labs:   Hemoglobin A1C   Date Value Ref Range Status   10/31/2024 7.80 (H) 4.80 - 5.60 % Final     TSH   Date Value Ref Range Status   04/10/2024 1.350 0.270 - 4.200 uIU/mL Final        POCT Results (if applicable):   Results for orders placed or performed in visit on 10/31/24   Comprehensive Metabolic Panel    Collection Time: 10/31/24  3:44 PM    Specimen: Arm, Right; Blood   Result Value Ref Range    Glucose 97 65 - 99 mg/dL    BUN 22 8 - 23 mg/dL    Creatinine 1.10 0.76 - 1.27 mg/dL    Sodium 141 136 - 145 mmol/L    Potassium 3.2 (L) 3.5 - 5.2 mmol/L    Chloride 99 98 - 107 mmol/L    CO2 26.9 22.0 - 29.0 mmol/L    Calcium 8.9 8.6 - 10.5 mg/dL    Total Protein 7.4 6.0 - 8.5 g/dL    Albumin 3.7 3.5 - 5.2 g/dL    ALT (SGPT) 8 1 - 41 U/L    AST (SGOT) 17 1 - 40 U/L    Alkaline Phosphatase 36 (L) 39 - 117 U/L    Total Bilirubin 0.7 0.0 - 1.2 mg/dL    Globulin 3.7 gm/dL    A/G Ratio 1.0 g/dL    BUN/Creatinine Ratio 20.0 7.0 - 25.0    Anion Gap 15.1 (H) 5.0 - 15.0 mmol/L    eGFR 67.0 >60.0 mL/min/1.73   Hemoglobin A1c    Collection Time: 10/31/24  3:44 PM    Specimen: Arm, Right; Blood   Result Value Ref Range    Hemoglobin A1C 7.80 (H) 4.80 - 5.60 %   Sedimentation Rate    Collection  Time: 10/31/24  3:44 PM    Specimen: Arm, Right; Blood   Result Value Ref Range    Sed Rate 6 0 - 20 mm/hr   C-reactive Protein    Collection Time: 10/31/24  3:44 PM    Specimen: Arm, Right; Blood   Result Value Ref Range    C-Reactive Protein 0.69 (H) 0.00 - 0.50 mg/dL   proBNP    Collection Time: 10/31/24  3:44 PM    Specimen: Arm, Right; Blood   Result Value Ref Range    proBNP 3,057.0 (H) 0.0 - 1,800.0 pg/mL   CBC (No Diff)    Collection Time: 10/31/24  3:44 PM    Specimen: Arm, Right; Blood   Result Value Ref Range    WBC 11.54 (H) 3.40 - 10.80 10*3/mm3    RBC 5.24 4.14 - 5.80 10*6/mm3    Hemoglobin 15.1 13.0 - 17.7 g/dL    Hematocrit 44.6 37.5 - 51.0 %    MCV 85.1 79.0 - 97.0 fL    MCH 28.8 26.6 - 33.0 pg    MCHC 33.9 31.5 - 35.7 g/dL    RDW 13.6 12.3 - 15.4 %    RDW-SD 41.5 37.0 - 54.0 fl    MPV 9.9 6.0 - 12.0 fL    Platelets 285 140 - 450 10*3/mm3       Imaging:   No valid procedures specified.     Measures:   Advanced Care Planning:   Patient has an advance directive in EMR which is still valid.     Smoking Cessation:   Non-smoker.    Assessment / Plan      Assessment/Plan:   Diagnoses and all orders for this visit:    1. Type 2 diabetes mellitus without complication, without long-term current use of insulin (Primary)  Patient does appear to be doing relatively well with respect to their diabetes.  They are tolerating their medications without complaints.  We will continue to follow their hemoglobin A1c and will make adjustments to keep their level less than 7%.  I do suspect that his hemoglobin A1c elevation secondary to prednisone.  We will continue to monitor closely and will make adjustments based on these findings.  .-     Hemoglobin A1c; Future    2. Essential hypertension  Patient appears to be tolerating their blood pressure medicine without any side effects.  We will continue to monitor their blood pressure and will adjust to keep there is systolic blood pressure less than 130.  We will continue to  monitor renal function and will make adjustments based on these findings.  -     Comprehensive Metabolic Panel; Future    3. Dyslipidemia   Patient does appear to be tolerating their lipid-lowering agent without difficulty.  We will obtain the lipid profile as well as liver function test to observe for any abnormalities.  We will continue to adjust medications to keep their LDL less than 70.    4. Panlobular emphysema   Patient is doing well with respect to his emphysema.  He does not use any rescue inhaler and does not wish to receive 1.  He states that his maintenance therapy is all that he needs as he does continue to do well.  We will make no modification present time.    5. IHD (ischemic heart disease)   Patient does appear to be stable with respect to their cardiac disease.  They do continue to tolerate their medications without any side effects.  They have not had any chest pain symptomatology or PND/orthopnea.  We have encouraged patient to continue to keep follow-up with a cardiologist.  If they develop any cardiac symptomatology prior to the neck scheduled follow-up, they will contact us at that time and we will assess immediately and possible make early referrals.    6. Immunization refused   Patient would benefit from having immunizations given.  Patient has elected not to receive the recommended vaccines at present time.  They understand the risk of not receiving these vaccine and the disease in which they may incur.  We have discussed other options and will pursue with encouragement of compliance with future appointments.  If patient does change their minds prior to the next scheduled follow-up, they may contact us and we will provide immunization at that time.    7. Hypokalemia   We will repeat laboratory data to assess his potassium level.    8. Shortness of breath  His BNP is improving.  We will repeat to see where we stand.-     proBNP; Future        Follow Up:   Return in about 3 months (around  5/3/2025).      At [facility], we believe that sharing information builds trust and better relationships. You are receiving this note because you recently visited [facility]. It is possible you will see health information before a provider has talked with you about it. This kind of information can be easy to misunderstand. To help you fully understand what it means for your health, we urge you to discuss this note with your provider.    Rocky Morel MD  [facility]    Patient or patient representative verbalized consent for the use of Ambient Listening during the visit with  Rocky Morel MD for chart documentation. 2/3/2025  15:56 EST

## 2025-02-04 LAB
ALBUMIN SERPL-MCNC: 4.3 G/DL (ref 3.5–5.2)
ALBUMIN/GLOB SERPL: 1.3 G/DL
ALP SERPL-CCNC: 36 U/L (ref 39–117)
ALT SERPL W P-5'-P-CCNC: 14 U/L (ref 1–41)
ANION GAP SERPL CALCULATED.3IONS-SCNC: 13 MMOL/L (ref 5–15)
AST SERPL-CCNC: 19 U/L (ref 1–40)
BILIRUB SERPL-MCNC: 0.5 MG/DL (ref 0–1.2)
BUN SERPL-MCNC: 21 MG/DL (ref 8–23)
BUN/CREAT SERPL: 16 (ref 7–25)
CALCIUM SPEC-SCNC: 10.4 MG/DL (ref 8.6–10.5)
CHLORIDE SERPL-SCNC: 104 MMOL/L (ref 98–107)
CO2 SERPL-SCNC: 25 MMOL/L (ref 22–29)
CREAT SERPL-MCNC: 1.31 MG/DL (ref 0.76–1.27)
EGFRCR SERPLBLD CKD-EPI 2021: 54 ML/MIN/1.73
GLOBULIN UR ELPH-MCNC: 3.3 GM/DL
GLUCOSE SERPL-MCNC: 168 MG/DL (ref 65–99)
NT-PROBNP SERPL-MCNC: 2966 PG/ML (ref 0–1800)
POTASSIUM SERPL-SCNC: 4.9 MMOL/L (ref 3.5–5.2)
PROT SERPL-MCNC: 7.6 G/DL (ref 6–8.5)
SODIUM SERPL-SCNC: 142 MMOL/L (ref 136–145)

## 2025-02-18 ENCOUNTER — TELEPHONE (OUTPATIENT)
Dept: FAMILY MEDICINE CLINIC | Facility: CLINIC | Age: 85
End: 2025-02-18

## 2025-02-18 DIAGNOSIS — J01.00 ACUTE NON-RECURRENT MAXILLARY SINUSITIS: Primary | ICD-10-CM

## 2025-02-18 NOTE — TELEPHONE ENCOUNTER
"Caller: Lucio Castañeda \"Pito\"    Relationship: Self    Best call back number: [phone]     What medication are you requesting: ANTIBIOTIC FOR SINUSES    If a prescription is needed, what is your preferred pharmacy and phone number: [id number] [place], [place] - [address] [phone] [facility] [phone] FX     "

## 2025-03-26 RX ORDER — CARVEDILOL 12.5 MG/1
12.5 TABLET ORAL 2 TIMES DAILY
Qty: 180 TABLET | Refills: 0 | Status: SHIPPED | OUTPATIENT
Start: 2025-03-26

## 2025-04-15 RX ORDER — AMLODIPINE BESYLATE 10 MG/1
10 TABLET ORAL DAILY
Qty: 90 TABLET | Refills: 0 | Status: SHIPPED | OUTPATIENT
Start: 2025-04-15

## 2025-05-05 ENCOUNTER — OFFICE VISIT (OUTPATIENT)
Dept: FAMILY MEDICINE CLINIC | Facility: CLINIC | Age: 85
End: 2025-05-05
Payer: MEDICARE

## 2025-05-05 ENCOUNTER — LAB (OUTPATIENT)
Dept: FAMILY MEDICINE CLINIC | Facility: CLINIC | Age: 85
End: 2025-05-05
Payer: MEDICARE

## 2025-05-05 VITALS
TEMPERATURE: 98 F | HEART RATE: 63 BPM | RESPIRATION RATE: 18 BRPM | BODY MASS INDEX: 18.61 KG/M2 | OXYGEN SATURATION: 95 % | WEIGHT: 130 LBS | HEIGHT: 70 IN | DIASTOLIC BLOOD PRESSURE: 70 MMHG | SYSTOLIC BLOOD PRESSURE: 118 MMHG

## 2025-05-05 DIAGNOSIS — R06.02 SHORTNESS OF BREATH: ICD-10-CM

## 2025-05-05 DIAGNOSIS — E78.2 MIXED HYPERLIPIDEMIA: ICD-10-CM

## 2025-05-05 DIAGNOSIS — M05.731 RHEUMATOID ARTHRITIS INVOLVING BOTH WRISTS WITH POSITIVE RHEUMATOID FACTOR: ICD-10-CM

## 2025-05-05 DIAGNOSIS — E11.9 ENCOUNTER FOR DIABETIC FOOT EXAM: ICD-10-CM

## 2025-05-05 DIAGNOSIS — Z00.00 WELL ADULT EXAM: Primary | ICD-10-CM

## 2025-05-05 DIAGNOSIS — Z28.21 IMMUNIZATION REFUSED: ICD-10-CM

## 2025-05-05 DIAGNOSIS — E78.5 DYSLIPIDEMIA: ICD-10-CM

## 2025-05-05 DIAGNOSIS — Z00.00 WELL ADULT EXAM: ICD-10-CM

## 2025-05-05 DIAGNOSIS — I10 ESSENTIAL HYPERTENSION: ICD-10-CM

## 2025-05-05 DIAGNOSIS — E11.9 TYPE 2 DIABETES MELLITUS WITHOUT COMPLICATION, WITHOUT LONG-TERM CURRENT USE OF INSULIN: ICD-10-CM

## 2025-05-05 DIAGNOSIS — J43.1 PANLOBULAR EMPHYSEMA: ICD-10-CM

## 2025-05-05 DIAGNOSIS — I25.9 IHD (ISCHEMIC HEART DISEASE): ICD-10-CM

## 2025-05-05 DIAGNOSIS — M05.732 RHEUMATOID ARTHRITIS INVOLVING BOTH WRISTS WITH POSITIVE RHEUMATOID FACTOR: ICD-10-CM

## 2025-05-05 DIAGNOSIS — F41.9 ANXIETY: ICD-10-CM

## 2025-05-05 DIAGNOSIS — R80.1 PERSISTENT PROTEINURIA: ICD-10-CM

## 2025-05-05 LAB
ALBUMIN UR-MCNC: 2.9 MG/DL
BILIRUB UR QL STRIP: NEGATIVE
CLARITY UR: CLEAR
COLOR UR: YELLOW
CREAT UR-MCNC: 129.7 MG/DL
GLUCOSE UR STRIP-MCNC: ABNORMAL MG/DL
HGB UR QL STRIP.AUTO: NEGATIVE
KETONES UR QL STRIP: ABNORMAL
LEUKOCYTE ESTERASE UR QL STRIP.AUTO: ABNORMAL
MICROALBUMIN/CREAT UR: 22.4 MG/G (ref 0–29)
NITRITE UR QL STRIP: NEGATIVE
PH UR STRIP.AUTO: 6 [PH] (ref 5–8)
PROT UR QL STRIP: ABNORMAL
SP GR UR STRIP: 1.02 (ref 1–1.03)
UROBILINOGEN UR QL STRIP: ABNORMAL
VIT B12 BLD-MCNC: 410 PG/ML (ref 211–946)

## 2025-05-05 PROCEDURE — 87186 SC STD MICRODIL/AGAR DIL: CPT

## 2025-05-05 PROCEDURE — 82043 UR ALBUMIN QUANTITATIVE: CPT | Performed by: FAMILY MEDICINE

## 2025-05-05 PROCEDURE — 82607 VITAMIN B-12: CPT | Performed by: FAMILY MEDICINE

## 2025-05-05 PROCEDURE — 83036 HEMOGLOBIN GLYCOSYLATED A1C: CPT | Performed by: FAMILY MEDICINE

## 2025-05-05 PROCEDURE — 84443 ASSAY THYROID STIM HORMONE: CPT | Performed by: FAMILY MEDICINE

## 2025-05-05 PROCEDURE — 83880 ASSAY OF NATRIURETIC PEPTIDE: CPT | Performed by: FAMILY MEDICINE

## 2025-05-05 PROCEDURE — 87077 CULTURE AEROBIC IDENTIFY: CPT | Performed by: FAMILY MEDICINE

## 2025-05-05 PROCEDURE — 87086 URINE CULTURE/COLONY COUNT: CPT | Performed by: FAMILY MEDICINE

## 2025-05-05 PROCEDURE — 80061 LIPID PANEL: CPT | Performed by: FAMILY MEDICINE

## 2025-05-05 PROCEDURE — 36415 COLL VENOUS BLD VENIPUNCTURE: CPT | Performed by: FAMILY MEDICINE

## 2025-05-05 PROCEDURE — 82570 ASSAY OF URINE CREATININE: CPT | Performed by: FAMILY MEDICINE

## 2025-05-05 PROCEDURE — 83695 ASSAY OF LIPOPROTEIN(A): CPT | Performed by: FAMILY MEDICINE

## 2025-05-05 PROCEDURE — 81001 URINALYSIS AUTO W/SCOPE: CPT | Performed by: FAMILY MEDICINE

## 2025-05-05 PROCEDURE — 85027 COMPLETE CBC AUTOMATED: CPT | Performed by: FAMILY MEDICINE

## 2025-05-05 PROCEDURE — 80053 COMPREHEN METABOLIC PANEL: CPT | Performed by: FAMILY MEDICINE

## 2025-05-05 RX ORDER — SERTRALINE HYDROCHLORIDE 25 MG/1
25 TABLET, FILM COATED ORAL DAILY
Qty: 90 TABLET | Refills: 3 | Status: SHIPPED | OUTPATIENT
Start: 2025-05-05

## 2025-05-05 NOTE — ASSESSMENT & PLAN NOTE
Patient does appear to be tolerating their lipid-lowering agent without difficulty.  We will obtain the lipid profile as well as liver function test to observe for any abnormalities.  We will continue to adjust medications to keep their LDL less than 70.

## 2025-05-05 NOTE — PROGRESS NOTES
Subjective   The ABCs of the Annual Wellness Visit  Medicare Wellness Visit      Lucio Castañdea is a 84 y.o. patient who presents for a Medicare Wellness Visit.    The following portions of the patient's history were reviewed and   updated as appropriate: allergies, current medications, past family history, past medical history, past social history, past surgical history, and problem list.    Compared to one year ago, the patient's physical   health is the same.  Compared to one year ago, the patient's mental   health is the same.    Recent Hospitalizations:  He was not admitted to the hospital during the last year.     Current Medical Providers:  Patient Care Team:  Rocky Morel MD as PCP - General (Family Medicine)  Rocky Morel MD as Referring Physician (Family Medicine)    Outpatient Medications Prior to Visit   Medication Sig Dispense Refill    amLODIPine (NORVASC) 10 MG tablet TAKE ONE TABLET BY MOUTH EVERY DAY 90 tablet 0    aspirin 81 MG tablet Take 1 tablet by mouth Daily.      carvedilol (COREG) 12.5 MG tablet TAKE ONE TABLET BY MOUTH TWO TIMES A  tablet 0    clopidogrel (PLAVIX) 75 MG tablet TAKE ONE TABLET BY MOUTH DAILY 90 tablet 3    furosemide (Lasix) 20 MG tablet Take 1 tablet by mouth Daily. 3 tablet 0    leflunomide (ARAVA) 10 MG tablet       omeprazole (priLOSEC) 40 MG capsule TAKE ONE CAPSULE BY MOUTH EVERY DAY 90 capsule 3    predniSONE (DELTASONE) 5 MG tablet Take 1 tablet by mouth Daily. For 3 weeks then stop. (Patient taking differently: Take 1 tablet by mouth Daily. Per rheumatologist) 30 tablet 1    simvastatin (ZOCOR) 40 MG tablet TAKE ONE TABLET BY MOUTH EVERY DAY 90 tablet 2    Trelegy Ellipta 200-62.5-25 MCG/ACT inhaler INHALE 1 PUFF BY MOUTH DAILY RINSE MOUTH AFTER EACH USE 60 each 11    amoxicillin-clavulanate (AUGMENTIN) 875-125 MG per tablet Take 1 tablet by mouth 2 (Two) Times a Day. 20 tablet 0     No facility-administered medications prior to  "visit.     No opioid medication identified on active medication list. I have reviewed chart for other potential  high risk medication/s and harmful drug interactions in the elderly.      Aspirin is on active medication list. Aspirin use is not indicated based on review of current medical condition/s. Risk of harm outweighs potential benefits. Patient instructed to discontinue this medication.  .      Patient Active Problem List   Diagnosis    Essential hypertension    Gastroesophageal reflux disease without esophagitis    IHD (ischemic heart disease)    Diverticul disease small and large intestine, no perforati or abscess    Dyslipidemia    Carotid stenosis, symptomatic w/o infarct, left    Amaurosis fugax of left eye    COPD (chronic obstructive pulmonary disease)    Hematoma, right femoral     Advance Care Planning Advance Directive is on file.  ACP discussion was held with the patient during this visit. Patient has an advance directive in EMR which is still valid.             Objective   Vitals:    25 1405   BP: 118/70   BP Location: Left arm   Patient Position: Sitting   Cuff Size: Adult   Pulse: 63   Resp: 18   Temp: 98 °F (36.7 °C)   TempSrc: Temporal   SpO2: 95%   Weight: 59 kg (130 lb)   Height: 177.8 cm (70\")       Estimated body mass index is 18.65 kg/m² as calculated from the following:    Height as of this encounter: 177.8 cm (70\").    Weight as of this encounter: 59 kg (130 lb).    BMI is within normal parameters. No other follow-up for BMI required.           Does the patient have evidence of cognitive impairment? No                                                                                                Health  Risk Assessment    Smoking Status:  Social History     Tobacco Use   Smoking Status Former    Current packs/day: 0.00    Average packs/day: 1 pack/day for 28.5 years (28.5 ttl pk-yrs)    Types: Cigarettes    Start date:     Quit date: 7/10/1997    Years since quittin.8    " Passive exposure: Past   Smokeless Tobacco Current    Types: Snuff, Chew     Alcohol Consumption:  Social History     Substance and Sexual Activity   Alcohol Use No       Fall Risk Screen  STEADI Fall Risk Assessment was completed, and patient is at LOW risk for falls.Assessment completed on:2025    Depression Screening   Little interest or pleasure in doing things? Not at all   Feeling down, depressed, or hopeless? Not at all   PHQ-2 Total Score 0      Health Habits and Functional and Cognitive Screenin/5/2025     2:00 PM   Functional & Cognitive Status   Do you have difficulty preparing food and eating? No   Do you have difficulty bathing yourself, getting dressed or grooming yourself? No   Do you have difficulty using the toilet? No   Do you have difficulty moving around from place to place? No   Do you have trouble with steps or getting out of a bed or a chair? No   Current Diet Well Balanced Diet   Dental Exam Up to date   Eye Exam Up to date   Exercise (times per week) 7 times per week   Current Exercises Include Walking   Do you need help using the phone?  No   Are you deaf or do you have serious difficulty hearing?  No   Do you need help to go to places out of walking distance? No   Do you need help shopping? No   Do you need help preparing meals?  No   Do you need help with housework?  No   Do you need help with laundry? No   Do you need help taking your medications? No   Do you need help managing money? No   Do you ever drive or ride in a car without wearing a seat belt? No   Have you felt unusual stress, anger or loneliness in the last month? No   Who do you live with? Spouse   If you need help, do you have trouble finding someone available to you? No   Have you been bothered in the last four weeks by sexual problems? No   Do you have difficulty concentrating, remembering or making decisions? No           Age-appropriate Screening Schedule:  Refer to the list below for future screening  recommendations based on patient's age, sex and/or medical conditions. Orders for these recommended tests are listed in the plan section. The patient has been provided with a written plan.    Health Maintenance List  Health Maintenance   Topic Date Due    DIABETIC EYE EXAM  Never done    URINE MICROALBUMIN-CREATININE RATIO (uACR)  Never done    HEMOGLOBIN A1C  08/03/2025    COVID-19 Vaccine (4 - 2024-25 season) 09/03/2025 (Originally 9/1/2024)    TDAP/TD VACCINES (1 - Tdap) 11/01/2025 (Originally 12/2/1959)    ZOSTER VACCINE (1 of 2) 11/01/2025 (Originally 12/2/1991)    RSV Vaccine - Adults (1 - 1-dose 75+ series) 05/05/2026 (Originally 12/2/2016)    LIPID PANEL  08/01/2025    ANNUAL WELLNESS VISIT  05/05/2026    DIABETIC FOOT EXAM  05/05/2026    Pneumococcal Vaccine 50+  Completed    INFLUENZA VACCINE  Discontinued                                                                                                                                                CMS Preventative Services Quick Reference  Risk Factors Identified During Encounter  Immunizations Discussed/Encouraged: Tdap, Prevnar 20 (Pneumococcal 20-valent conjugate), Shingrix, and RSV (Respiratory Syncytial Virus)    The above risks/problems have been discussed with the patient.  Pertinent information has been shared with the patient in the After Visit Summary.  An After Visit Summary and PPPS were made available to the patient.    Follow Up:   Next Medicare Wellness visit to be scheduled in 1 year.         Additional E&M Note during same encounter follows:  Patient has additional, significant, and separately identifiable condition(s)/problem(s) that require work above and beyond the Medicare Wellness Visit     Chief Complaint  Follow-up and Diabetes    Subjective   Diabetes  Associated symptoms:     no chest pain, no fatigue and no weakness    Hypoglycemia symptoms:     no dizziness, no headaches, is not nervous/anxious, no seizures and no tremors       Pito is also being seen today for additional medical problem/s.The patient is an 84-year-old male who presents for evaluation of rheumatoid arthritis, toenail fungus, and mood issues.    He has been experiencing difficulty seeing under low light conditions, despite consultations with three different ophthalmologists. He reports that his retinas are not as responsive to light as they used to be. His current eyeglasses only aid in reading due to the bifocal component. He also experiences soreness in the corner of his eye. He has an upcoming appointment with an eye surgeon in [place] on 05/27/2025, where they plan to perform an eyelid lift to remove excess skin, which they believe may be contributing to his vision problems.    He reports no anxiety or depression. However, he has been experiencing frequent falls since last year. Despite these incidents, he maintains his ability to perform daily activities independently. His wife and daughter have expressed concerns about his temper, suggesting that he may benefit from medication to manage it. He describes himself as generally good-natured but admits to occasional irritability. He has always valued his independence and finds his current living situation challenging.    His rheumatoid arthritis is well-managed, with no reported swelling in his hands. He is currently on a regimen of prednisone 5 mg once daily and an additional arthritis medication.    He has a mild fungal infection in his toenails, which he has been treating with Vicks VapoRub.     Patient appears to be doing otherwise well.  They have continue with their medications without any side effects.  They have not had any changes in their usual activity, appetite and sleep.  Patient denies any other cardiovascular, respiratory, gastrointestinal, urologic or neurologic complaints.    Review of Systems   Constitutional:  Negative for activity change, appetite change and fatigue.   Respiratory:  Negative for  "cough, chest tightness, shortness of breath and wheezing.    Cardiovascular:  Negative for chest pain, palpitations and leg swelling.   Gastrointestinal:  Negative for abdominal distention, abdominal pain, blood in stool, constipation, diarrhea, nausea and vomiting.   Genitourinary:  Negative for difficulty urinating, dysuria, enuresis, frequency, hematuria and testicular pain.   Musculoskeletal:  Positive for arthralgias, back pain and myalgias. Negative for gait problem and joint swelling.   Neurological:  Negative for dizziness, tremors, seizures, syncope, weakness, light-headedness and numbness.   Psychiatric/Behavioral:  Negative for dysphoric mood and sleep disturbance. The patient is not nervous/anxious.               Objective   Vital Signs:  /70 (BP Location: Left arm, Patient Position: Sitting, Cuff Size: Adult)   Pulse 63   Temp 98 °F (36.7 °C) (Temporal)   Resp 18   Ht 177.8 cm (70\")   Wt 59 kg (130 lb)   SpO2 95%   BMI 18.65 kg/m²   Physical Exam  Vitals and nursing note reviewed.   Constitutional:       Appearance: Normal appearance.   HENT:      Head: Normocephalic and atraumatic.      Nose: Nose normal.      Mouth/Throat:      Pharynx: Oropharynx is clear.   Eyes:      Extraocular Movements: Extraocular movements intact.      Pupils: Pupils are equal, round, and reactive to light.   Neck:      Thyroid: No thyroid mass or thyromegaly.      Trachea: Trachea normal.   Cardiovascular:      Rate and Rhythm: Normal rate and regular rhythm.      Pulses: Normal pulses. No decreased pulses.           Dorsalis pedis pulses are 2+ on the right side and 2+ on the left side.        Posterior tibial pulses are 2+ on the right side and 2+ on the left side.      Heart sounds: Normal heart sounds.   Pulmonary:      Effort: Pulmonary effort is normal.      Breath sounds: Normal breath sounds.   Abdominal:      General: Abdomen is flat. Bowel sounds are normal.      Palpations: Abdomen is soft.      " Tenderness: There is no abdominal tenderness.   Musculoskeletal:      Cervical back: Neck supple.      Right lower leg: No edema.      Left lower leg: No edema.      Right foot: No deformity.      Left foot: No deformity.   Feet:      Right foot:      Protective Sensation: 5 sites tested.  5 sites sensed.      Skin integrity: Skin integrity normal. No ulcer, skin breakdown, erythema or callus.      Toenail Condition: Fungal disease present.     Left foot:      Protective Sensation: 5 sites tested.  5 sites sensed.      Skin integrity: Skin integrity normal. No ulcer, skin breakdown, erythema or callus.      Toenail Condition: Fungal disease present.  Lymphadenopathy:      Cervical: No cervical adenopathy.   Skin:     General: Skin is warm and dry.   Neurological:      General: No focal deficit present.      Mental Status: He is alert and oriented to person, place, and time.      Sensory: Sensation is intact.      Motor: Motor function is intact.      Coordination: Coordination is intact.   Psychiatric:         Attention and Perception: Attention normal.         Mood and Affect: Mood normal.         Speech: Speech normal.         Behavior: Behavior normal.                  Assessment and Plan      Well adult exam   Patient did have a wellness exam performed today that did not reveal any abnormality.  Their  function/cognition/anxiety/depression/fall risk assessments were reviewed.  We did discuss safety issues as well as anticipatory guidance.  We will monitor laboratory data and if there are any concerns or worsening of symptoms prior to the next scheduled follow-up they will contact us.  Orders:    CBC (No Diff); Future    Comprehensive Metabolic Panel; Future    Hemoglobin A1c; Future    Lipid Panel; Future    Lipoprotein A (LPA); Future    Microalbumin / Creatinine Urine Ratio - Urine, Clean Catch; Future    Vitamin B12; Future    TSH Rfx On Abnormal To Free T4; Future    Urinalysis without microscopic (no  culture) - Urine, Clean Catch; Future    Type 2 diabetes mellitus without complication, without long-term current use of insulin   Patient does appear to be doing relatively well with respect to their diabetes.  They are tolerating their medications without complaints.  We will continue to follow their hemoglobin A1c and will make adjustments to keep their level less than 7%.         Essential hypertension   Patient appears to be tolerating their blood pressure medicine without any side effects.  We will continue to monitor their blood pressure and will adjust to keep there is systolic blood pressure less than 130.  We will continue to monitor renal function and will make adjustments based on these findings.         Dyslipidemia   Patient does appear to be tolerating their lipid-lowering agent without difficulty.  We will obtain the lipid profile as well as liver function test to observe for any abnormalities.  We will continue to adjust medications to keep their LDL less than 70.       IHD (ischemic heart disease)   Patient does appear to be stable with respect to their cardiac disease.  They do continue to tolerate their medications without any side effects.  They have not had any chest pain symptomatology or PND/orthopnea.  We have encouraged patient to continue to keep follow-up with a cardiologist.  If they develop any cardiac symptomatology prior to the neck scheduled follow-up, they will contact us at that time and we will assess immediately and possible make early referrals.         Panlobular emphysema   Patient doing well at present time.  No problems are noted currently.  He rarely uses any respiratory medications.  We will continue to monitor and will treat accordingly.         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  Does continue to follow-up with the rheumatologist who is using prednisone intermittently.  We will continue to monitor  and if he has different issues we will further assess address.       Encounter for diabetic foot exam   Patient diabetic foot exam performed.  He did have what appeared to be some fungal infection of his toenails.  We have discussed options and have encouraged him to use Vicks VapoRub.  He will continue with daily inspection and watch his feet closely and if he has other issues or concerns further assessment treatment may be necessary.         Immunization refused   Patient would benefit from having immunizations given.  Patient has elected not to receive the recommended vaccines at present time.  They understand the risk of not receiving these vaccine and the disease in which they may incur.  We have discussed other options and will pursue with encouragement of compliance with future appointments.  If patient does change their minds prior to the next scheduled follow-up, they may contact us and we will provide immunization at that time.       Shortness of breath   Patient has had some shortness of breath.  He did have a recent elevation of his BNP.  We will recheck levels and will make adjustments based on these findings.  Orders:    proBNP; Future    Mixed hyperlipidemia    Patient does appear to be tolerating their lipid-lowering agent without difficulty.  We will obtain the lipid profile as well as liver function test to observe for any abnormalities.  We will continue to adjust medications to keep their LDL less than 70.    Orders:    Lipoprotein A (LPA); Future    Anxiety   Patient does have some problems with anxiety and agitation.  His family has suggested that he initiate some medication.  We have discussed risk and benefits and have decided to pursue with Zoloft.  He understands the symptomatology were to observe for and if he has other issues or concerns before his next scheduled follow-up he will contact us.  Orders:    sertraline (Zoloft) 25 MG tablet; Take 1 tablet by mouth Daily.            Follow Up    Return in about 3 months (around 8/5/2025).  Patient was given instructions and counseling regarding his condition or for health maintenance advice. Please see specific information pulled into the AVS if appropriate.

## 2025-05-05 NOTE — ASSESSMENT & PLAN NOTE
Patient doing well at present time.  No problems are noted currently.  He rarely uses any respiratory medications.  We will continue to monitor and will treat accordingly.

## 2025-05-05 NOTE — ASSESSMENT & PLAN NOTE
Patient does appear to be stable with respect to their cardiac disease.  They do continue to tolerate their medications without any side effects.  They have not had any chest pain symptomatology or PND/orthopnea.  We have encouraged patient to continue to keep follow-up with a cardiologist.  If they develop any cardiac symptomatology prior to the neck scheduled follow-up, they will contact us at that time and we will assess immediately and possible make early referrals.

## 2025-05-06 ENCOUNTER — RESULTS FOLLOW-UP (OUTPATIENT)
Dept: FAMILY MEDICINE CLINIC | Facility: CLINIC | Age: 85
End: 2025-05-06
Payer: MEDICARE

## 2025-05-06 DIAGNOSIS — R80.1 PERSISTENT PROTEINURIA: Primary | ICD-10-CM

## 2025-05-06 DIAGNOSIS — I10 ESSENTIAL HYPERTENSION: ICD-10-CM

## 2025-05-06 DIAGNOSIS — E78.41 ELEVATED LIPOPROTEIN(A): ICD-10-CM

## 2025-05-06 LAB
ALBUMIN SERPL-MCNC: 4 G/DL (ref 3.5–5.2)
ALBUMIN/GLOB SERPL: 1.1 G/DL
ALP SERPL-CCNC: 36 U/L (ref 39–117)
ALT SERPL W P-5'-P-CCNC: 9 U/L (ref 1–41)
ANION GAP SERPL CALCULATED.3IONS-SCNC: 12.5 MMOL/L (ref 5–15)
AST SERPL-CCNC: 16 U/L (ref 1–40)
BACTERIA UR QL AUTO: ABNORMAL /HPF
BILIRUB SERPL-MCNC: 0.5 MG/DL (ref 0–1.2)
BILIRUB UR QL STRIP: NEGATIVE
BUN SERPL-MCNC: 21 MG/DL (ref 8–23)
BUN/CREAT SERPL: 14.3 (ref 7–25)
CALCIUM SPEC-SCNC: 9.3 MG/DL (ref 8.6–10.5)
CHLORIDE SERPL-SCNC: 102 MMOL/L (ref 98–107)
CHOLEST SERPL-MCNC: 152 MG/DL (ref 0–200)
CLARITY UR: ABNORMAL
CO2 SERPL-SCNC: 24.5 MMOL/L (ref 22–29)
COLOR UR: YELLOW
CREAT SERPL-MCNC: 1.47 MG/DL (ref 0.76–1.27)
DEPRECATED RDW RBC AUTO: 41.9 FL (ref 37–54)
EGFRCR SERPLBLD CKD-EPI 2021: 47 ML/MIN/1.73
ERYTHROCYTE [DISTWIDTH] IN BLOOD BY AUTOMATED COUNT: 13 % (ref 12.3–15.4)
GLOBULIN UR ELPH-MCNC: 3.5 GM/DL
GLUCOSE SERPL-MCNC: 238 MG/DL (ref 65–99)
GLUCOSE UR STRIP-MCNC: ABNORMAL MG/DL
HBA1C MFR BLD: 6.6 % (ref 4.8–5.6)
HCT VFR BLD AUTO: 41.6 % (ref 37.5–51)
HDLC SERPL-MCNC: 50 MG/DL (ref 40–60)
HGB BLD-MCNC: 13.8 G/DL (ref 13–17.7)
HGB UR QL STRIP.AUTO: NEGATIVE
HYALINE CASTS UR QL AUTO: ABNORMAL /LPF
KETONES UR QL STRIP: ABNORMAL
LDLC SERPL CALC-MCNC: 77 MG/DL (ref 0–100)
LDLC/HDLC SERPL: 1.47 {RATIO}
LEUKOCYTE ESTERASE UR QL STRIP.AUTO: ABNORMAL
MCH RBC QN AUTO: 29.8 PG (ref 26.6–33)
MCHC RBC AUTO-ENTMCNC: 33.2 G/DL (ref 31.5–35.7)
MCV RBC AUTO: 89.8 FL (ref 79–97)
NITRITE UR QL STRIP: NEGATIVE
NT-PROBNP SERPL-MCNC: 1904 PG/ML (ref 0–1800)
PH UR STRIP.AUTO: 6 [PH] (ref 5–8)
PLATELET # BLD AUTO: 241 10*3/MM3 (ref 140–450)
PMV BLD AUTO: 10.5 FL (ref 6–12)
POTASSIUM SERPL-SCNC: 3.5 MMOL/L (ref 3.5–5.2)
PROT SERPL-MCNC: 7.5 G/DL (ref 6–8.5)
PROT UR QL STRIP: ABNORMAL
RBC # BLD AUTO: 4.63 10*6/MM3 (ref 4.14–5.8)
RBC # UR STRIP: ABNORMAL /HPF
REF LAB TEST METHOD: ABNORMAL
SODIUM SERPL-SCNC: 139 MMOL/L (ref 136–145)
SP GR UR STRIP: 1.02 (ref 1–1.03)
SQUAMOUS #/AREA URNS HPF: ABNORMAL /HPF
TRIGL SERPL-MCNC: 142 MG/DL (ref 0–150)
TSH SERPL DL<=0.05 MIU/L-ACNC: 0.85 UIU/ML (ref 0.27–4.2)
UROBILINOGEN UR QL STRIP: ABNORMAL
VLDLC SERPL-MCNC: 25 MG/DL (ref 5–40)
WBC # UR STRIP: ABNORMAL /HPF
WBC NRBC COR # BLD AUTO: 11.39 10*3/MM3 (ref 3.4–10.8)

## 2025-05-06 RX ORDER — RAMIPRIL 1.25 MG/1
1.25 CAPSULE ORAL DAILY
Qty: 90 CAPSULE | Refills: 3 | Status: SHIPPED | OUTPATIENT
Start: 2025-05-06

## 2025-05-06 NOTE — TELEPHONE ENCOUNTER
Attempted to reach the patient regarding results but was unable to contact them or leave a voicemail.       Relay

## 2025-05-07 LAB — LPA SERPL-SCNC: 194 NMOL/L

## 2025-05-07 NOTE — TELEPHONE ENCOUNTER
Contacted the patient to discuss blood work. The patients daughter expressed good understanding and appreciation.    She states that patient has been complaining with stomach pain and frequency.

## 2025-05-08 LAB — BACTERIA SPEC AEROBE CULT: ABNORMAL

## 2025-05-08 RX ORDER — CEFDINIR 300 MG/1
300 CAPSULE ORAL 2 TIMES DAILY
Qty: 20 CAPSULE | Refills: 0 | Status: SHIPPED | OUTPATIENT
Start: 2025-05-08

## 2025-05-08 NOTE — TELEPHONE ENCOUNTER
PT IS AT THE PHARMACY NOW. PHARM WOULD LIKE TO KNOW IF YOU CAN GO AHEAD AND SEND IN THE ANTIBIOTICS.

## 2025-06-13 RX ORDER — OMEPRAZOLE 40 MG/1
40 CAPSULE, DELAYED RELEASE ORAL DAILY
Qty: 90 CAPSULE | Refills: 0 | Status: SHIPPED | OUTPATIENT
Start: 2025-06-13

## 2025-06-17 RX ORDER — FLUTICASONE FUROATE, UMECLIDINIUM BROMIDE AND VILANTEROL TRIFENATATE 200; 62.5; 25 UG/1; UG/1; UG/1
1 POWDER RESPIRATORY (INHALATION) DAILY
Qty: 60 EACH | Refills: 11 | Status: SHIPPED | OUTPATIENT
Start: 2025-06-17

## 2025-07-06 RX ORDER — CARVEDILOL 12.5 MG/1
12.5 TABLET ORAL EVERY 12 HOURS SCHEDULED
Qty: 180 TABLET | Refills: 1 | Status: SHIPPED | OUTPATIENT
Start: 2025-07-06

## 2025-07-21 ENCOUNTER — TELEPHONE (OUTPATIENT)
Dept: FAMILY MEDICINE CLINIC | Facility: CLINIC | Age: 85
End: 2025-07-21
Payer: MEDICARE

## 2025-07-21 ENCOUNTER — RESULTS FOLLOW-UP (OUTPATIENT)
Dept: FAMILY MEDICINE CLINIC | Facility: CLINIC | Age: 85
End: 2025-07-21

## 2025-07-21 ENCOUNTER — OFFICE VISIT (OUTPATIENT)
Dept: FAMILY MEDICINE CLINIC | Facility: CLINIC | Age: 85
End: 2025-07-21
Payer: MEDICARE

## 2025-07-21 ENCOUNTER — LAB (OUTPATIENT)
Dept: FAMILY MEDICINE CLINIC | Facility: CLINIC | Age: 85
End: 2025-07-21
Payer: MEDICARE

## 2025-07-21 VITALS
OXYGEN SATURATION: 93 % | HEIGHT: 70 IN | SYSTOLIC BLOOD PRESSURE: 128 MMHG | TEMPERATURE: 98 F | HEART RATE: 58 BPM | DIASTOLIC BLOOD PRESSURE: 78 MMHG | WEIGHT: 122 LBS | BODY MASS INDEX: 17.47 KG/M2 | RESPIRATION RATE: 16 BRPM

## 2025-07-21 DIAGNOSIS — J44.1 COPD WITH EXACERBATION: ICD-10-CM

## 2025-07-21 DIAGNOSIS — M05.731 RHEUMATOID ARTHRITIS INVOLVING BOTH WRISTS WITH POSITIVE RHEUMATOID FACTOR: ICD-10-CM

## 2025-07-21 DIAGNOSIS — M05.732 RHEUMATOID ARTHRITIS INVOLVING BOTH WRISTS WITH POSITIVE RHEUMATOID FACTOR: ICD-10-CM

## 2025-07-21 DIAGNOSIS — R93.89 ABNORMAL FINDINGS ON DIAGNOSTIC IMAGING OF OTHER SPECIFIED BODY STRUCTURES: ICD-10-CM

## 2025-07-21 DIAGNOSIS — R06.02 SOB (SHORTNESS OF BREATH): Primary | ICD-10-CM

## 2025-07-21 DIAGNOSIS — R06.02 SHORTNESS OF BREATH: ICD-10-CM

## 2025-07-21 DIAGNOSIS — J44.1 COPD WITH EXACERBATION: Primary | ICD-10-CM

## 2025-07-21 DIAGNOSIS — R79.9 ABNORMAL FINDING OF BLOOD CHEMISTRY, UNSPECIFIED: ICD-10-CM

## 2025-07-21 LAB
ALBUMIN SERPL-MCNC: 3.6 G/DL (ref 3.5–5.2)
ALBUMIN/GLOB SERPL: 1.1 G/DL
ALP SERPL-CCNC: 25 U/L (ref 39–117)
ALT SERPL W P-5'-P-CCNC: 7 U/L (ref 1–41)
ANION GAP SERPL CALCULATED.3IONS-SCNC: 15 MMOL/L (ref 5–15)
AST SERPL-CCNC: 16 U/L (ref 1–40)
BILIRUB SERPL-MCNC: 0.5 MG/DL (ref 0–1.2)
BUN SERPL-MCNC: 25 MG/DL (ref 8–23)
BUN/CREAT SERPL: 16.7 (ref 7–25)
CALCIUM SPEC-SCNC: 9.2 MG/DL (ref 8.6–10.5)
CHLORIDE SERPL-SCNC: 100 MMOL/L (ref 98–107)
CO2 SERPL-SCNC: 24 MMOL/L (ref 22–29)
CREAT SERPL-MCNC: 1.5 MG/DL (ref 0.76–1.27)
DEPRECATED RDW RBC AUTO: 39.5 FL (ref 37–54)
EGFRCR SERPLBLD CKD-EPI 2021: 45.9 ML/MIN/1.73
ERYTHROCYTE [DISTWIDTH] IN BLOOD BY AUTOMATED COUNT: 12.3 % (ref 12.3–15.4)
GLOBULIN UR ELPH-MCNC: 3.4 GM/DL
GLUCOSE SERPL-MCNC: 197 MG/DL (ref 65–99)
HCT VFR BLD AUTO: 39.6 % (ref 37.5–51)
HGB BLD-MCNC: 13.1 G/DL (ref 13–17.7)
MCH RBC QN AUTO: 29.6 PG (ref 26.6–33)
MCHC RBC AUTO-ENTMCNC: 33.1 G/DL (ref 31.5–35.7)
MCV RBC AUTO: 89.6 FL (ref 79–97)
NT-PROBNP SERPL-MCNC: 2154 PG/ML (ref 0–1800)
PLATELET # BLD AUTO: 236 10*3/MM3 (ref 140–450)
PMV BLD AUTO: 9.5 FL (ref 6–12)
POTASSIUM SERPL-SCNC: 3.5 MMOL/L (ref 3.5–5.2)
PROT SERPL-MCNC: 7 G/DL (ref 6–8.5)
RBC # BLD AUTO: 4.42 10*6/MM3 (ref 4.14–5.8)
SODIUM SERPL-SCNC: 139 MMOL/L (ref 136–145)
TSH SERPL DL<=0.05 MIU/L-ACNC: 1.45 UIU/ML (ref 0.27–4.2)
WBC NRBC COR # BLD AUTO: 11.29 10*3/MM3 (ref 3.4–10.8)

## 2025-07-21 PROCEDURE — 84443 ASSAY THYROID STIM HORMONE: CPT | Performed by: PHYSICIAN ASSISTANT

## 2025-07-21 PROCEDURE — 36415 COLL VENOUS BLD VENIPUNCTURE: CPT | Performed by: FAMILY MEDICINE

## 2025-07-21 PROCEDURE — 1126F AMNT PAIN NOTED NONE PRSNT: CPT | Performed by: PHYSICIAN ASSISTANT

## 2025-07-21 PROCEDURE — 83880 ASSAY OF NATRIURETIC PEPTIDE: CPT | Performed by: PHYSICIAN ASSISTANT

## 2025-07-21 PROCEDURE — 3078F DIAST BP <80 MM HG: CPT | Performed by: PHYSICIAN ASSISTANT

## 2025-07-21 PROCEDURE — 3074F SYST BP LT 130 MM HG: CPT | Performed by: PHYSICIAN ASSISTANT

## 2025-07-21 PROCEDURE — G2211 COMPLEX E/M VISIT ADD ON: HCPCS | Performed by: PHYSICIAN ASSISTANT

## 2025-07-21 PROCEDURE — 85007 BL SMEAR W/DIFF WBC COUNT: CPT | Performed by: PHYSICIAN ASSISTANT

## 2025-07-21 PROCEDURE — 85027 COMPLETE CBC AUTOMATED: CPT | Performed by: PHYSICIAN ASSISTANT

## 2025-07-21 PROCEDURE — 99214 OFFICE O/P EST MOD 30 MIN: CPT | Performed by: PHYSICIAN ASSISTANT

## 2025-07-21 PROCEDURE — 80053 COMPREHEN METABOLIC PANEL: CPT | Performed by: PHYSICIAN ASSISTANT

## 2025-07-21 RX ORDER — PREDNISONE 10 MG/1
40 TABLET ORAL DAILY
Qty: 20 TABLET | Refills: 0 | Status: SHIPPED | OUTPATIENT
Start: 2025-07-21 | End: 2025-07-26

## 2025-07-21 RX ORDER — LEVOFLOXACIN 500 MG/1
500 TABLET, FILM COATED ORAL DAILY
Qty: 7 TABLET | Refills: 0 | Status: SHIPPED | OUTPATIENT
Start: 2025-07-21

## 2025-07-21 NOTE — TELEPHONE ENCOUNTER
Contacted patients daughter to speak about imaging. Patients daughter verbalized good understanding and appreciation. No questions or concerns.

## 2025-07-21 NOTE — TELEPHONE ENCOUNTER
Caller: Araceli Lincoln    Relationship to patient: Emergency Contact    Best call back number: [phone]     Chief complaint: SHORTNESS OF BREATH AT REST AND EXERTION, NO APPETITE, NO ENERGY, HASN'T LEFT BED IN 3 DAYS, DIARRHEA    Patient directed to call 911 or go to their nearest emergency room.     Patient verbalized understanding: [x] Yes  [] No  If no, why?    Additional notes:

## 2025-07-21 NOTE — PROGRESS NOTES
Follow Up Office Visit      Date: 2025   Patient Name: Lucio Castañeda  : 1940   MRN: [id number]     Chief Complaint:    Chief Complaint   Patient presents with    Pneumonia     Cough 2 weeks, shortness breathe, and fatigue       History of Present Illness: Lucio Castañeda is a 84 y.o. male who is here today for .  History of Present Illness  The patient presents for evaluation of cough.    He has been experiencing a persistent cough for the past 2 to 3 weeks, which initially presented as a productive cough. He reports no chest pain but does experience shortness of breath when walking to his car. He also mentions waking up feeling sweaty. His oxygen saturation levels at home have been consistently between 97 and 98 percent. He has not taken any antibiotics. He has been managing his symptoms with Claritin for nasal drip and has not taken any antibiotics. His current medication regimen includes a daily steroid dose of 5 mg and the use of Trelegy.    He has lost approximately 8 pounds due to frequent diarrhea and abdominal pain. Despite this, he maintains a regular diet of three meals a day, supplemented by a peanut butter sandwich late at night and candy throughout the day.    He reports a history of pneumonia and COVID-19. He also mentions having a colostomy bag, which occasionally causes discomfort. He has an upcoming eye appointment on 2025 and is scheduled for surgery to address droopy eyelids.    Social History:  Diet: He eats three meals a day, supplemented by a peanut butter sandwich late at night and candy throughout the day.       Subjective      Review of Systems:   Review of Systems   Constitutional: Negative.    HENT: Negative.     Respiratory:  Positive for cough, chest tightness and shortness of breath. Negative for wheezing.    Cardiovascular: Negative.    Gastrointestinal:  Positive for abdominal pain and diarrhea.     I have reviewed the patients family history, social history,  past medical history, past surgical history and have updated it as appropriate.     Medications:     Current Outpatient Medications:     amLODIPine (NORVASC) 10 MG tablet, TAKE ONE TABLET BY MOUTH EVERY DAY, Disp: 90 tablet, Rfl: 0    aspirin 81 MG tablet, Take 1 tablet by mouth Daily., Disp: , Rfl:     carvedilol (COREG) 12.5 MG tablet, TAKE ONE TABLET BY MOUTH TWO TIMES A DAY, Disp: 180 tablet, Rfl: 1    cefdinir (OMNICEF) 300 MG capsule, Take 1 capsule by mouth 2 (Two) Times a Day., Disp: 20 capsule, Rfl: 0    clopidogrel (PLAVIX) 75 MG tablet, TAKE ONE TABLET BY MOUTH DAILY, Disp: 90 tablet, Rfl: 3    Fluticasone-Umeclidin-Vilant (Trelegy Ellipta) 200-62.5-25 MCG/ACT inhaler, use 1 puff daily, Disp: 60 each, Rfl: 11    furosemide (Lasix) 20 MG tablet, Take 1 tablet by mouth Daily., Disp: 3 tablet, Rfl: 0    leflunomide (ARAVA) 10 MG tablet, , Disp: , Rfl:     levoFLOXacin (LEVAQUIN) 500 MG tablet, Take 1 tablet by mouth Daily., Disp: 7 tablet, Rfl: 0    omeprazole (priLOSEC) 40 MG capsule, TAKE ONE CAPSULE BY MOUTH EVERY DAY, Disp: 90 capsule, Rfl: 0    predniSONE (DELTASONE) 10 MG tablet, Take 4 tablets by mouth Daily for 5 days., Disp: 20 tablet, Rfl: 0    predniSONE (DELTASONE) 5 MG tablet, Take 1 tablet by mouth Daily. For 3 weeks then stop. (Patient taking differently: Take 1 tablet by mouth Daily. Per rheumatologist), Disp: 30 tablet, Rfl: 1    ramipril (Altace) 1.25 MG capsule, Take 1 capsule by mouth Daily., Disp: 90 capsule, Rfl: 3    sertraline (Zoloft) 25 MG tablet, Take 1 tablet by mouth Daily., Disp: 90 tablet, Rfl: 3    simvastatin (ZOCOR) 40 MG tablet, TAKE ONE TABLET BY MOUTH EVERY DAY, Disp: 90 tablet, Rfl: 2    Allergies:   Allergies   Allergen Reactions    Morphine Hallucinations       Objective     Physical Exam: Please see above  Vital Signs:   Vitals:    07/21/25 1103   BP: 128/78   BP Location: Right arm   Patient Position: Sitting   Cuff Size: Adult   Pulse: 58   Resp: 16   Temp: 98 °F  "(36.7 °C)   TempSrc: Temporal   SpO2: 93%   Weight: 55.3 kg (122 lb)   Height: 177.8 cm (70\")   PainSc: 0-No pain     Body mass index is 17.51 kg/m².    Physical Exam  Cardiovascular:      Rate and Rhythm: Normal rate and regular rhythm.      Heart sounds: No murmur heard.     No friction rub. No gallop.   Pulmonary:      Effort: Pulmonary effort is normal. No respiratory distress.      Breath sounds: Normal breath sounds. No wheezing or rales.     Procedures    Assessment / Plan      Assessment/Plan:   1. COPD with exacerbation    - Coughing up sputum for the past 2-3 weeks.  - Oxygen saturation at 93%, no wheezing, slight congestion noted.  - Chest x-ray and blood work ordered to assess condition.  - Prescribed Levaquin and prednisone 40 mg daily for 5 days, then resume 5 mg daily.  - Noted an 8-pound weight loss.  - Physical exam reveals significant weight loss and overall poor condition.  - Blood work ordered to identify potential imbalances contributing to weight loss.  - Advised to maintain a balanced diet and monitor weight closely.      - XR Chest PA & Lateral (In Office)  - CBC & Differential; Future  - Comprehensive Metabolic Panel; Future  - TSH Rfx On Abnormal To Free T4; Future  - levoFLOXacin (LEVAQUIN) 500 MG tablet; Take 1 tablet by mouth Daily.  Dispense: 7 tablet; Refill: 0  - predniSONE (DELTASONE) 10 MG tablet; Take 4 tablets by mouth Daily for 5 days.  Dispense: 20 tablet; Refill: 0  - CBC With Manual Differential; Future    2. Abnormal findings on diagnostic imaging of other specified body structures    - TSH Rfx On Abnormal To Free T4; Future    3. 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 Abnormal finding of blood chemistry, unspecified         Assessment & Plan  1. Cough.  - Coughing up sputum for the past 2-3 weeks.  - Oxygen saturation at 93%, no wheezing, slight congestion noted.  - Chest x-ray and blood work ordered to assess condition.  - " Prescribed Levaquin and prednisone 40 mg daily for 5 days, then resume 5 mg daily.    2. Weight loss.  - Noted an 8-pound weight loss.  - Physical exam reveals significant weight loss and overall poor condition.  - Blood work ordered to identify potential imbalances contributing to weight loss.  - Advised to maintain a balanced diet and monitor weight closely.    Follow Up:   No follow-ups on file.      At [facility], we believe that sharing information builds trust and better relationships. You are receiving this note because you recently visited [facility]. It is possible you will see health information before a provider has talked with you about it. This kind of information can be easy to misunderstand. To help you fully understand what it means for your health, we urge you to discuss this note with your provider.    Patient or patient representative verbalized consent for the use of Ambient Listening during the visit with  Yari Morel PA-C for chart documentation. 7/22/2025  17:01 EDT     SAM Ott

## 2025-07-21 NOTE — TELEPHONE ENCOUNTER
Attempt to reach daughter in regards to seeing if we can get patient an appointment to be seen today. No answer, voicemail left asking to return call.

## 2025-07-22 LAB
BASOPHILS # BLD MANUAL: 0.23 10*3/MM3 (ref 0–0.2)
BASOPHILS NFR BLD MANUAL: 2 % (ref 0–1.5)
BURR CELLS BLD QL SMEAR: ABNORMAL
EOSINOPHIL # BLD MANUAL: 0.58 10*3/MM3 (ref 0–0.4)
EOSINOPHIL NFR BLD MANUAL: 5.1 % (ref 0.3–6.2)
LYMPHOCYTES # BLD MANUAL: 1.48 10*3/MM3 (ref 0.7–3.1)
LYMPHOCYTES NFR BLD MANUAL: 5.1 % (ref 5–12)
MONOCYTES # BLD: 0.58 10*3/MM3 (ref 0.1–0.9)
NEUTROPHILS # BLD AUTO: 8.43 10*3/MM3 (ref 1.7–7)
NEUTROPHILS NFR BLD MANUAL: 74.7 % (ref 42.7–76)
PLAT MORPH BLD: NORMAL
POIKILOCYTOSIS BLD QL SMEAR: ABNORMAL
VARIANT LYMPHS NFR BLD MANUAL: 13.1 % (ref 19.6–45.3)
WBC MORPH BLD: NORMAL

## 2025-07-22 NOTE — TELEPHONE ENCOUNTER
Contacted patients daughter to speak about imaging. Patients geoff verbalized good understanding and appreciation. No questions or concerns.

## 2025-07-23 RX ORDER — SIMVASTATIN 40 MG
40 TABLET ORAL DAILY
Qty: 90 TABLET | Refills: 1 | Status: SHIPPED | OUTPATIENT
Start: 2025-07-23

## 2025-07-23 RX ORDER — AMLODIPINE BESYLATE 10 MG/1
10 TABLET ORAL DAILY
Qty: 90 TABLET | Refills: 1 | Status: SHIPPED | OUTPATIENT
Start: 2025-07-23

## 2025-07-31 ENCOUNTER — HOSPITAL ENCOUNTER (OUTPATIENT)
Dept: CARDIOLOGY | Facility: HOSPITAL | Age: 85
Discharge: HOME | End: 2025-07-31
Admitting: FAMILY MEDICINE
Payer: MEDICARE

## 2025-07-31 VITALS
DIASTOLIC BLOOD PRESSURE: 62 MMHG | SYSTOLIC BLOOD PRESSURE: 122 MMHG | BODY MASS INDEX: 17.45 KG/M2 | HEIGHT: 70 IN | WEIGHT: 121.91 LBS

## 2025-07-31 DIAGNOSIS — R06.02 SHORTNESS OF BREATH: ICD-10-CM

## 2025-07-31 LAB
AORTIC DIMENSIONLESS INDEX: 0.45 (DI)
AV MEAN PRESS GRAD SYS DOP V1V2: 4 MMHG
AV VMAX SYS DOP: 127 CM/SEC
BH CV ECHO MEAS - AO MAX PG: 6.5 MMHG
BH CV ECHO MEAS - AO ROOT DIAM: 3.1 CM
BH CV ECHO MEAS - AO V2 VTI: 30.9 CM
BH CV ECHO MEAS - AVA(I,D): 1.68 CM2
BH CV ECHO MEAS - EDV(CUBED): 134.2 ML
BH CV ECHO MEAS - EDV(MOD-SP4): 108 ML
BH CV ECHO MEAS - EF(MOD-SP4): 58.7 %
BH CV ECHO MEAS - ESV(CUBED): 66.9 ML
BH CV ECHO MEAS - ESV(MOD-SP4): 44.6 ML
BH CV ECHO MEAS - FS: 20.7 %
BH CV ECHO MEAS - IVS/LVPW: 0.94 CM
BH CV ECHO MEAS - IVSD: 0.95 CM
BH CV ECHO MEAS - LA DIMENSION: 3.9 CM
BH CV ECHO MEAS - LAT PEAK E' VEL: 8.3 CM/SEC
BH CV ECHO MEAS - LV DIASTOLIC VOL/BSA (35-75): 63.8 CM2
BH CV ECHO MEAS - LV MASS(C)D: 184.2 GRAMS
BH CV ECHO MEAS - LV MAX PG: 1.4 MMHG
BH CV ECHO MEAS - LV MEAN PG: 1 MMHG
BH CV ECHO MEAS - LV SYSTOLIC VOL/BSA (12-30): 26.4 CM2
BH CV ECHO MEAS - LV V1 MAX: 59.2 CM/SEC
BH CV ECHO MEAS - LV V1 VTI: 13.9 CM
BH CV ECHO MEAS - LVIDD: 5.1 CM
BH CV ECHO MEAS - LVIDS: 4.1 CM
BH CV ECHO MEAS - LVOT AREA: 3.7 CM2
BH CV ECHO MEAS - LVOT DIAM: 2.18 CM
BH CV ECHO MEAS - LVPWD: 1.01 CM
BH CV ECHO MEAS - MED PEAK E' VEL: 4.9 CM/SEC
BH CV ECHO MEAS - MV A MAX VEL: 70.6 CM/SEC
BH CV ECHO MEAS - MV DEC SLOPE: 142.6 CM/SEC2
BH CV ECHO MEAS - MV DEC TIME: 0.28 SEC
BH CV ECHO MEAS - MV E MAX VEL: 65.5 CM/SEC
BH CV ECHO MEAS - MV E/A: 0.93
BH CV ECHO MEAS - MV MAX PG: 2.8 MMHG
BH CV ECHO MEAS - MV MEAN PG: 1 MMHG
BH CV ECHO MEAS - MV P1/2T: 140.6 MSEC
BH CV ECHO MEAS - MV V2 VTI: 35 CM
BH CV ECHO MEAS - MVA(P1/2T): 1.57 CM2
BH CV ECHO MEAS - MVA(VTI): 1.48 CM2
BH CV ECHO MEAS - PA ACC TIME: 0.1 SEC
BH CV ECHO MEAS - PA V2 MAX: 57.6 CM/SEC
BH CV ECHO MEAS - RAP SYSTOLE: 10 MMHG
BH CV ECHO MEAS - RV MAX PG: 1.05 MMHG
BH CV ECHO MEAS - RV V1 MAX: 51.3 CM/SEC
BH CV ECHO MEAS - RV V1 VTI: 13.7 CM
BH CV ECHO MEAS - RVSP: 48 MMHG
BH CV ECHO MEAS - SV(LVOT): 51.9 ML
BH CV ECHO MEAS - SV(MOD-SP4): 63.4 ML
BH CV ECHO MEAS - SVI(LVOT): 30.7 ML/M2
BH CV ECHO MEAS - SVI(MOD-SP4): 37.5 ML/M2
BH CV ECHO MEAS - TAPSE (>1.6): 2.02 CM
BH CV ECHO MEAS - TR MAX PG: 37.7 MMHG
BH CV ECHO MEAS - TR MAX VEL: 307 CM/SEC
BH CV ECHO MEASUREMENTS AVERAGE E/E' RATIO: 9.92
BH CV XLRA - RV BASE: 2.8 CM
BH CV XLRA - RV LENGTH: 7.9 CM
BH CV XLRA - RV MID: 3.3 CM
BH CV XLRA - TDI S': 12.3 CM/SEC
LV EF 2D ECHO EST: 46 %

## 2025-07-31 PROCEDURE — 93306 TTE W/DOPPLER COMPLETE: CPT

## 2025-08-05 ENCOUNTER — OFFICE VISIT (OUTPATIENT)
Dept: FAMILY MEDICINE CLINIC | Facility: CLINIC | Age: 85
End: 2025-08-05
Payer: MEDICARE

## 2025-08-05 VITALS
OXYGEN SATURATION: 91 % | DIASTOLIC BLOOD PRESSURE: 94 MMHG | BODY MASS INDEX: 17.47 KG/M2 | HEART RATE: 69 BPM | TEMPERATURE: 98 F | WEIGHT: 122 LBS | RESPIRATION RATE: 16 BRPM | SYSTOLIC BLOOD PRESSURE: 160 MMHG | HEIGHT: 70 IN

## 2025-08-05 DIAGNOSIS — F41.9 ANXIETY: ICD-10-CM

## 2025-08-05 DIAGNOSIS — E78.2 MIXED HYPERLIPIDEMIA: ICD-10-CM

## 2025-08-05 DIAGNOSIS — I27.20 PULMONARY HYPERTENSION: ICD-10-CM

## 2025-08-05 DIAGNOSIS — M05.731 RHEUMATOID ARTHRITIS INVOLVING BOTH WRISTS WITH POSITIVE RHEUMATOID FACTOR: ICD-10-CM

## 2025-08-05 DIAGNOSIS — J43.1 PANLOBULAR EMPHYSEMA: ICD-10-CM

## 2025-08-05 DIAGNOSIS — E11.9 TYPE 2 DIABETES MELLITUS WITHOUT COMPLICATION, WITHOUT LONG-TERM CURRENT USE OF INSULIN: ICD-10-CM

## 2025-08-05 DIAGNOSIS — Z28.21 IMMUNIZATION REFUSED: ICD-10-CM

## 2025-08-05 DIAGNOSIS — M05.732 RHEUMATOID ARTHRITIS INVOLVING BOTH WRISTS WITH POSITIVE RHEUMATOID FACTOR: ICD-10-CM

## 2025-08-05 DIAGNOSIS — I10 ESSENTIAL HYPERTENSION: Primary | ICD-10-CM

## 2025-08-05 DIAGNOSIS — I25.9 IHD (ISCHEMIC HEART DISEASE): ICD-10-CM

## 2025-08-05 LAB
EXPIRATION DATE: ABNORMAL
HBA1C MFR BLD: 7.5 % (ref 4.5–5.7)
Lab: ABNORMAL

## 2025-08-06 ENCOUNTER — TELEPHONE (OUTPATIENT)
Dept: FAMILY MEDICINE CLINIC | Facility: CLINIC | Age: 85
End: 2025-08-06
Payer: MEDICARE

## 2025-08-06 DIAGNOSIS — K52.9 CHRONIC DIARRHEA: Primary | ICD-10-CM

## 2025-08-15 ENCOUNTER — RESULTS FOLLOW-UP (OUTPATIENT)
Dept: FAMILY MEDICINE CLINIC | Facility: CLINIC | Age: 85
End: 2025-08-15
Payer: MEDICARE

## 2025-08-19 ENCOUNTER — TELEPHONE (OUTPATIENT)
Dept: FAMILY MEDICINE CLINIC | Facility: CLINIC | Age: 85
End: 2025-08-19
Payer: MEDICARE

## 2025-08-26 ENCOUNTER — LAB (OUTPATIENT)
Dept: FAMILY MEDICINE CLINIC | Facility: CLINIC | Age: 85
End: 2025-08-26
Payer: MEDICARE

## 2025-08-26 ENCOUNTER — OFFICE VISIT (OUTPATIENT)
Dept: FAMILY MEDICINE CLINIC | Facility: CLINIC | Age: 85
End: 2025-08-26
Payer: MEDICARE

## 2025-08-26 VITALS
RESPIRATION RATE: 16 BRPM | DIASTOLIC BLOOD PRESSURE: 70 MMHG | WEIGHT: 125 LBS | TEMPERATURE: 98 F | SYSTOLIC BLOOD PRESSURE: 116 MMHG | BODY MASS INDEX: 17.9 KG/M2 | OXYGEN SATURATION: 91 % | HEART RATE: 61 BPM | HEIGHT: 70 IN

## 2025-08-26 DIAGNOSIS — I10 ESSENTIAL HYPERTENSION: ICD-10-CM

## 2025-08-26 DIAGNOSIS — Z28.21 IMMUNIZATION REFUSED: ICD-10-CM

## 2025-08-26 DIAGNOSIS — R06.02 SOB (SHORTNESS OF BREATH): ICD-10-CM

## 2025-08-26 DIAGNOSIS — K52.9 CHRONIC DIARRHEA: ICD-10-CM

## 2025-08-26 DIAGNOSIS — I27.20 PULMONARY HYPERTENSION: Primary | ICD-10-CM

## 2025-08-26 DIAGNOSIS — J43.1 PANLOBULAR EMPHYSEMA: ICD-10-CM

## 2025-08-26 DIAGNOSIS — E11.9 TYPE 2 DIABETES MELLITUS WITHOUT COMPLICATION, WITHOUT LONG-TERM CURRENT USE OF INSULIN: ICD-10-CM

## 2025-08-26 DIAGNOSIS — R91.1 PULMONARY NODULE: ICD-10-CM

## 2025-08-26 LAB
ALBUMIN SERPL-MCNC: 3.6 G/DL (ref 3.5–5.2)
ALBUMIN/GLOB SERPL: 0.9 G/DL
ALP SERPL-CCNC: 36 U/L (ref 39–117)
ALT SERPL W P-5'-P-CCNC: 10 U/L (ref 1–41)
ANION GAP SERPL CALCULATED.3IONS-SCNC: 13.2 MMOL/L (ref 5–15)
AST SERPL-CCNC: 14 U/L (ref 1–40)
BILIRUB SERPL-MCNC: 0.4 MG/DL (ref 0–1.2)
BUN SERPL-MCNC: 16 MG/DL (ref 8–23)
BUN/CREAT SERPL: 16.3 (ref 7–25)
CALCIUM SPEC-SCNC: 9.5 MG/DL (ref 8.6–10.5)
CHLORIDE SERPL-SCNC: 102 MMOL/L (ref 98–107)
CO2 SERPL-SCNC: 24.8 MMOL/L (ref 22–29)
CREAT SERPL-MCNC: 0.98 MG/DL (ref 0.76–1.27)
EGFRCR SERPLBLD CKD-EPI 2021: 76.5 ML/MIN/1.73
GLOBULIN UR ELPH-MCNC: 3.9 GM/DL
GLUCOSE SERPL-MCNC: 164 MG/DL (ref 65–99)
NT-PROBNP SERPL-MCNC: 3564 PG/ML (ref 0–1800)
POTASSIUM SERPL-SCNC: 4.2 MMOL/L (ref 3.5–5.2)
PROT SERPL-MCNC: 7.5 G/DL (ref 6–8.5)
SODIUM SERPL-SCNC: 140 MMOL/L (ref 136–145)

## 2025-08-26 PROCEDURE — 36415 COLL VENOUS BLD VENIPUNCTURE: CPT | Performed by: FAMILY MEDICINE

## 2025-08-26 PROCEDURE — 83880 ASSAY OF NATRIURETIC PEPTIDE: CPT | Performed by: FAMILY MEDICINE

## 2025-08-26 PROCEDURE — 80053 COMPREHEN METABOLIC PANEL: CPT | Performed by: FAMILY MEDICINE

## (undated) DEVICE — ROTATING HEMOSTATIC VALVE .096": Brand: RHV

## (undated) DEVICE — EMBOSHIELD NAV6 EMBOLIC PROTECTION SYSTEM 7.2 MM X 190 CM: Brand: EMBOSHIELD  NAV6

## (undated) DEVICE — ANGIO-SEAL VIP VASCULAR CLOSURE DEVICE: Brand: ANGIO-SEAL

## (undated) DEVICE — LEX NEURO ANGIOGRAPHY: Brand: MEDLINE INDUSTRIES, INC.

## (undated) DEVICE — RADIFOCUS GLIDEWIRE: Brand: GLIDEWIRE

## (undated) DEVICE — Device

## (undated) DEVICE — ST ACC MICROPUNCTURE .018 TRANSLSS/SS/TP 5F/10CM 21G/7CM

## (undated) DEVICE — VIATRAC 14 PLUS PERIPHERAL DILATATION CATHETER 6.0 MM X 30 MM X 135 CM: Brand: VIATRAC

## (undated) DEVICE — CATH TEMPO 5F BER 100CM: Brand: TEMPO

## (undated) DEVICE — LIMB HOLDER, WRIST/ANKLE: Brand: DEROYAL

## (undated) DEVICE — SKIN PREP TRAY W/CHG: Brand: MEDLINE INDUSTRIES, INC.

## (undated) DEVICE — INTRO SHEATH ART/FEM ENGAGE .038 5F12CM

## (undated) DEVICE — INTRO SHEATH FLX 7F80CM